# Patient Record
Sex: MALE | Race: BLACK OR AFRICAN AMERICAN | Employment: FULL TIME | URBAN - METROPOLITAN AREA
[De-identification: names, ages, dates, MRNs, and addresses within clinical notes are randomized per-mention and may not be internally consistent; named-entity substitution may affect disease eponyms.]

---

## 2017-03-26 PROBLEM — E11.8 TYPE 2 DIABETES MELLITUS WITH COMPLICATION, WITH LONG-TERM CURRENT USE OF INSULIN (HCC): Status: ACTIVE | Noted: 2017-03-26

## 2017-03-26 PROBLEM — Z79.4 INSULIN LONG-TERM USE (HCC): Status: ACTIVE | Noted: 2017-03-26

## 2017-03-26 PROBLEM — Z79.4 TYPE 2 DIABETES MELLITUS WITH COMPLICATION, WITH LONG-TERM CURRENT USE OF INSULIN (HCC): Status: ACTIVE | Noted: 2017-03-26

## 2017-03-26 PROBLEM — E11.59 HYPERTENSION COMPLICATING DIABETES (HCC): Status: ACTIVE | Noted: 2017-03-26

## 2017-03-26 PROBLEM — I15.2 HYPERTENSION COMPLICATING DIABETES (HCC): Status: ACTIVE | Noted: 2017-03-26

## 2017-07-21 ENCOUNTER — HOSPITAL ENCOUNTER (EMERGENCY)
Age: 52
Discharge: ANOTHER HOSPITAL | End: 2017-07-21
Attending: EMERGENCY MEDICINE
Payer: COMMERCIAL

## 2017-07-21 ENCOUNTER — HOSPITAL ENCOUNTER (INPATIENT)
Age: 52
LOS: 3 days | Discharge: HOME | DRG: 683 | End: 2017-07-25
Attending: HOSPITALIST | Admitting: HOSPITALIST
Payer: COMMERCIAL

## 2017-07-21 ENCOUNTER — APPOINTMENT (OUTPATIENT)
Dept: GENERAL RADIOLOGY | Age: 52
End: 2017-07-21
Attending: NURSE PRACTITIONER
Payer: COMMERCIAL

## 2017-07-21 VITALS
BODY MASS INDEX: 31.98 KG/M2 | OXYGEN SATURATION: 99 % | SYSTOLIC BLOOD PRESSURE: 126 MMHG | HEIGHT: 69 IN | WEIGHT: 215.9 LBS | HEART RATE: 69 BPM | RESPIRATION RATE: 14 BRPM | TEMPERATURE: 98.4 F | DIASTOLIC BLOOD PRESSURE: 72 MMHG

## 2017-07-21 DIAGNOSIS — R79.89 AZOTEMIA: Primary | ICD-10-CM

## 2017-07-21 DIAGNOSIS — N18.9 ACUTE ON CHRONIC KIDNEY FAILURE (HCC): ICD-10-CM

## 2017-07-21 DIAGNOSIS — N17.9 ACUTE ON CHRONIC KIDNEY FAILURE (HCC): ICD-10-CM

## 2017-07-21 LAB
ALBUMIN SERPL BCP-MCNC: 3.6 G/DL (ref 3.5–5)
ALBUMIN/GLOB SERPL: 0.8 {RATIO} (ref 1.1–2.2)
ALP SERPL-CCNC: 86 U/L (ref 45–117)
ALT SERPL-CCNC: 182 U/L (ref 12–78)
ANION GAP BLD CALC-SCNC: 13 MMOL/L (ref 5–15)
ANION GAP BLD CALC-SCNC: 14 MMOL/L (ref 5–15)
APPEARANCE UR: CLEAR
AST SERPL W P-5'-P-CCNC: 74 U/L (ref 15–37)
BACTERIA URNS QL MICRO: ABNORMAL /HPF
BASOPHILS # BLD AUTO: 0.1 K/UL (ref 0–0.1)
BASOPHILS # BLD: 1 % (ref 0–1)
BILIRUB SERPL-MCNC: 0.8 MG/DL (ref 0.2–1)
BILIRUB UR QL: NEGATIVE
BUN BLD-MCNC: 70 MG/DL (ref 9–20)
BUN SERPL-MCNC: 80 MG/DL (ref 6–20)
BUN/CREAT SERPL: 17 (ref 12–20)
CA-I BLD-MCNC: 1.15 MMOL/L (ref 1.12–1.32)
CALCIUM SERPL-MCNC: 8.8 MG/DL (ref 8.5–10.1)
CHLORIDE BLD-SCNC: 111 MMOL/L (ref 98–107)
CHLORIDE SERPL-SCNC: 102 MMOL/L (ref 97–108)
CO2 BLD-SCNC: 21 MMOL/L (ref 21–32)
CO2 SERPL-SCNC: 23 MMOL/L (ref 21–32)
COLOR UR: ABNORMAL
CREAT BLD-MCNC: 4 MG/DL (ref 0.6–1.3)
CREAT SERPL-MCNC: 4.63 MG/DL (ref 0.7–1.3)
DIFFERENTIAL METHOD BLD: ABNORMAL
EOSINOPHIL # BLD: 0.3 K/UL (ref 0–0.4)
EOSINOPHIL NFR BLD: 4 % (ref 0–7)
EPITH CASTS URNS QL MICRO: ABNORMAL /LPF
ERYTHROCYTE [DISTWIDTH] IN BLOOD BY AUTOMATED COUNT: 15.6 % (ref 11.5–14.5)
GLOBULIN SER CALC-MCNC: 4.4 G/DL (ref 2–4)
GLUCOSE BLD-MCNC: 77 MG/DL (ref 65–100)
GLUCOSE SERPL-MCNC: 90 MG/DL (ref 65–100)
GLUCOSE UR STRIP.AUTO-MCNC: NEGATIVE MG/DL
HCT VFR BLD AUTO: 35 % (ref 36.6–50.3)
HCT VFR BLD CALC: 31 % (ref 36.6–50.3)
HGB BLD-MCNC: 10.5 GM/DL (ref 12.1–17)
HGB BLD-MCNC: 11.9 G/DL (ref 12.1–17)
HGB UR QL STRIP: NEGATIVE
KETONES UR QL STRIP.AUTO: NEGATIVE MG/DL
LACTATE SERPL-SCNC: 0.8 MMOL/L (ref 0.4–2)
LEUKOCYTE ESTERASE UR QL STRIP.AUTO: NEGATIVE
LYMPHOCYTES # BLD AUTO: 28 % (ref 12–49)
LYMPHOCYTES # BLD: 2.2 K/UL (ref 0.8–3.5)
MCH RBC QN AUTO: 26.6 PG (ref 26–34)
MCHC RBC AUTO-ENTMCNC: 34 G/DL (ref 30–36.5)
MCV RBC AUTO: 78.3 FL (ref 80–99)
MONOCYTES # BLD: 0.8 K/UL (ref 0–1)
MONOCYTES NFR BLD AUTO: 11 % (ref 5–13)
NEUTS SEG # BLD: 4.3 K/UL (ref 1.8–8)
NEUTS SEG NFR BLD AUTO: 56 % (ref 32–75)
NITRITE UR QL STRIP.AUTO: NEGATIVE
PH UR STRIP: 5 [PH] (ref 5–8)
PLATELET # BLD AUTO: 208 K/UL (ref 150–400)
POTASSIUM BLD-SCNC: 3.4 MMOL/L (ref 3.5–5.1)
POTASSIUM SERPL-SCNC: 3.3 MMOL/L (ref 3.5–5.1)
PROT SERPL-MCNC: 8 G/DL (ref 6.4–8.2)
PROT UR STRIP-MCNC: 30 MG/DL
RBC # BLD AUTO: 4.47 M/UL (ref 4.1–5.7)
RBC #/AREA URNS HPF: ABNORMAL /HPF (ref 0–5)
RBC MORPH BLD: ABNORMAL
SERVICE CMNT-IMP: ABNORMAL
SODIUM BLD-SCNC: 141 MMOL/L (ref 136–145)
SODIUM SERPL-SCNC: 138 MMOL/L (ref 136–145)
SP GR UR REFRACTOMETRY: 1.01 (ref 1–1.03)
UA: UC IF INDICATED,UAUC: ABNORMAL
UROBILINOGEN UR QL STRIP.AUTO: 0.2 EU/DL (ref 0.2–1)
WBC # BLD AUTO: 7.7 K/UL (ref 4.1–11.1)
WBC URNS QL MICRO: ABNORMAL /HPF (ref 0–4)

## 2017-07-21 PROCEDURE — 85025 COMPLETE CBC W/AUTO DIFF WBC: CPT | Performed by: NURSE PRACTITIONER

## 2017-07-21 PROCEDURE — 87086 URINE CULTURE/COLONY COUNT: CPT | Performed by: NURSE PRACTITIONER

## 2017-07-21 PROCEDURE — 74022 RADEX COMPL AQT ABD SERIES: CPT

## 2017-07-21 PROCEDURE — 65270000032 HC RM SEMIPRIVATE

## 2017-07-21 PROCEDURE — 96361 HYDRATE IV INFUSION ADD-ON: CPT

## 2017-07-21 PROCEDURE — 87040 BLOOD CULTURE FOR BACTERIA: CPT | Performed by: NURSE PRACTITIONER

## 2017-07-21 PROCEDURE — 80053 COMPREHEN METABOLIC PANEL: CPT | Performed by: NURSE PRACTITIONER

## 2017-07-21 PROCEDURE — 74011250636 HC RX REV CODE- 250/636: Performed by: NURSE PRACTITIONER

## 2017-07-21 PROCEDURE — 96360 HYDRATION IV INFUSION INIT: CPT

## 2017-07-21 PROCEDURE — 83605 ASSAY OF LACTIC ACID: CPT | Performed by: NURSE PRACTITIONER

## 2017-07-21 PROCEDURE — 81001 URINALYSIS AUTO W/SCOPE: CPT | Performed by: NURSE PRACTITIONER

## 2017-07-21 PROCEDURE — 99285 EMERGENCY DEPT VISIT HI MDM: CPT

## 2017-07-21 PROCEDURE — 36415 COLL VENOUS BLD VENIPUNCTURE: CPT | Performed by: NURSE PRACTITIONER

## 2017-07-21 PROCEDURE — 80047 BASIC METABLC PNL IONIZED CA: CPT

## 2017-07-21 RX ORDER — SODIUM CHLORIDE 0.9 % (FLUSH) 0.9 %
5-10 SYRINGE (ML) INJECTION AS NEEDED
Status: DISCONTINUED | OUTPATIENT
Start: 2017-07-21 | End: 2017-07-21 | Stop reason: HOSPADM

## 2017-07-21 RX ORDER — SODIUM CHLORIDE 0.9 % (FLUSH) 0.9 %
5-10 SYRINGE (ML) INJECTION EVERY 8 HOURS
Status: DISCONTINUED | OUTPATIENT
Start: 2017-07-21 | End: 2017-07-21 | Stop reason: HOSPADM

## 2017-07-21 RX ORDER — SODIUM CHLORIDE 9 MG/ML
150 INJECTION, SOLUTION INTRAVENOUS ONCE
Status: COMPLETED | OUTPATIENT
Start: 2017-07-21 | End: 2017-07-21

## 2017-07-21 RX ADMIN — SODIUM CHLORIDE 150 ML/HR: 900 INJECTION, SOLUTION INTRAVENOUS at 21:07

## 2017-07-21 RX ADMIN — SODIUM CHLORIDE 1000 ML: 900 INJECTION, SOLUTION INTRAVENOUS at 19:56

## 2017-07-21 RX ADMIN — SODIUM CHLORIDE 1000 ML: 900 INJECTION, SOLUTION INTRAVENOUS at 20:59

## 2017-07-21 RX ADMIN — SODIUM CHLORIDE 150 ML/HR: 900 INJECTION, SOLUTION INTRAVENOUS at 21:06

## 2017-07-21 RX ADMIN — SODIUM CHLORIDE 1000 ML: 900 INJECTION, SOLUTION INTRAVENOUS at 18:59

## 2017-07-21 NOTE — IP AVS SNAPSHOT
[address] 
[phone] Patient: Lesta Aase MRN: [id number] :1965 Current Discharge Medication List  
  
CONTINUE these medications which have CHANGED Dose & Instructions Dispensing Information Comments Morning Noon Evening Bedtime  
 lisinopril 40 mg tablet Commonly known as:  Dorathy Massed What changed:  See the new instructions. Your last dose was: Your next dose is:    
   
   
 Dose:  20 mg Take 0.5 Tabs by mouth daily. Quantity:  90 Tab Refills:  3 CONTINUE these medications which have NOT CHANGED Dose & Instructions Dispensing Information Comments Morning Noon Evening Bedtime  
 amLODIPine 5 mg tablet Commonly known as:  Baker Bars Your last dose was: Your next dose is: TAKE 1 TABLET EVERY DAY Quantity:  30 Tab Refills:  2  
     
   
   
   
  
 aspirin delayed-release 81 mg tablet Your last dose was: Your next dose is: TAKE 1 TABLET BY MOUTH DAILY WITH BREAKFAST Quantity:  30 Tab Refills:  5 BD INSULIN PEN NEEDLE UF MINI 31 gauge x 3/16\" Ndle Generic drug:  Insulin Needles (Disposable) Your last dose was: Your next dose is: USE AS DIRECTED EVERY DAY Quantity:  100 Pen Needle Refills:  1  
     
   
   
   
  
 carvedilol 25 mg tablet Commonly known as:  Barb Rodgers Your last dose was: Your next dose is: TAKE 1 TABLET TWICE A DAY Quantity:  180 Tab Refills:  3  
     
   
   
   
  
 doxazosin 4 mg tablet Commonly known as:  CARDURA Your last dose was: Your next dose is: TAKE 1 TABLET BY MOUTH ONCE DAILY Quantity:  90 Tab Refills:  3 LEVEMIR FLEXTOUCH 100 unit/mL (3 mL) Inpn Generic drug:  insulin detemir Your last dose was: Your next dose is: INJECT 20 UNITS SUBCUTANEOUSLY EVERY DAY Quantity:  15 Adjustable Dose Pre-filled Pen Syringe Refills:  1  
     
   
   
   
  
 potassium chloride SR 10 mEq tablet Commonly known as:  KLOR-CON 10 Your last dose was: Your next dose is: TAKE 1 TABLET DAILY Quantity:  90 Tab Refills:  3 STOP taking these medications   
 furosemide 40 mg tablet Commonly known as:  LASIX  
   
  
 ondansetron 4 mg disintegrating tablet Commonly known as:  ZOFRAN ODT  
   
  
 spironolactone 25 mg tablet Commonly known as:  ALDACTONE Where to Get Your Medications These medications were sent to [address] [place] [address] S. [address] [place] [address] S. [address] [facility], [address] [place] [address] Hours:  24-hours Phone:  [phone]  
  lisinopril 40 mg tablet

## 2017-07-21 NOTE — IP AVS SNAPSHOT
[address] 
[phone] Patient: Carrington Connor MRN: [id number] :1965 You are allergic to the following Allergen Reactions Flexeril (Cyclobenzaprine) Nausea Only  
    
 Gabapentin Unknown (comments) Recent Documentation Weight BMI Smoking Status 101.5 kg 33.03 kg/m2 Current Every Day Smoker Emergency Contacts Name Discharge Info Relation Home Work Mobile Jonh Gibbons CAREGIVER [3] Spouse [3] [phone] About your hospitalization You were admitted on:  2017 You last received care in the:  [facility] [phone] You were discharged on:  2017 Unit phone number:  [phone] Why you were hospitalized Your primary diagnosis was:  Acute Kidney Injury (Hcc) Your diagnoses also included:  Stage 3 Chronic Kidney Disease, Type 2 Diabetes Mellitus With Complication, With Long-Term Current Use Of Insulin (Hcc), Insulin Long-Term Use (Hcc), Hypertension Complicating Diabetes (Hcc), Cardiomyopathy (Hcc), Food Poisoning Providers Seen During Your Hospitalizations Provider Role Specialty Primary office phone Parisa Gay MD Attending Provider [facility] [phone] Jacey Harden MD Attending Provider Internal Medicine [phone] Nati Nuñez MD Attending Provider Family Practice [phone] Your Primary Care Physician (PCP) Primary Care Physician Office Phone Office Fax James Howard [phone] Follow-up Information Follow up With Details Comments Contact Info Nati Nuñez MD   [address] [address] 
[phone] Current Discharge Medication List  
  
CONTINUE these medications which have CHANGED Dose & Instructions Dispensing Information Comments Morning Noon Evening Bedtime lisinopril 40 mg tablet Commonly known as:  Cosme Patrick What changed:  See the new instructions. Your last dose was: Your next dose is:    
   
   
 Dose:  20 mg Take 0.5 Tabs by mouth daily. Quantity:  90 Tab Refills:  3 CONTINUE these medications which have NOT CHANGED Dose & Instructions Dispensing Information Comments Morning Noon Evening Bedtime  
 amLODIPine 5 mg tablet Commonly known as:  Ncik Lute Your last dose was: Your next dose is: TAKE 1 TABLET EVERY DAY Quantity:  30 Tab Refills:  2  
     
   
   
   
  
 aspirin delayed-release 81 mg tablet Your last dose was: Your next dose is: TAKE 1 TABLET BY MOUTH DAILY WITH BREAKFAST Quantity:  30 Tab Refills:  5 BD INSULIN PEN NEEDLE UF MINI 31 gauge x 3/16\" Ndle Generic drug:  Insulin Needles (Disposable) Your last dose was: Your next dose is: USE AS DIRECTED EVERY DAY Quantity:  100 Pen Needle Refills:  1  
     
   
   
   
  
 carvedilol 25 mg tablet Commonly known as:  Tamia Ready Your last dose was: Your next dose is: TAKE 1 TABLET TWICE A DAY Quantity:  180 Tab Refills:  3  
     
   
   
   
  
 doxazosin 4 mg tablet Commonly known as:  CARDURA Your last dose was: Your next dose is: TAKE 1 TABLET BY MOUTH ONCE DAILY Quantity:  90 Tab Refills:  3 LEVEMIR FLEXTOUCH 100 unit/mL (3 mL) Inpn Generic drug:  insulin detemir Your last dose was: Your next dose is: INJECT 20 UNITS SUBCUTANEOUSLY EVERY DAY Quantity:  15 Adjustable Dose Pre-filled Pen Syringe Refills:  1  
     
   
   
   
  
 potassium chloride SR 10 mEq tablet Commonly known as:  KLOR-CON 10 Your last dose was: Your next dose is: TAKE 1 TABLET DAILY Quantity:  90 Tab Refills:  3 STOP taking these medications   
 furosemide 40 mg tablet Commonly known as:  LASIX  
   
  
 ondansetron 4 mg disintegrating tablet Commonly known as:  ZOFRAN ODT  
   
  
 spironolactone 25 mg tablet Commonly known as:  ALDACTONE Where to Get Your Medications These medications were sent to [address] [place] [address]  [facility] S. [address] [facility], [address] [place] [address] Hours:  24-hours Phone:  [phone]  
  lisinopril 40 mg tablet Discharge Instructions Patient Discharge Instructions Luli Brooks / [id number] : 1965 Admitted 2017 Discharged: 2017 Take Home Medications · It is important that you take the medication exactly as they are prescribed. · Keep your medication in the bottles provided by the pharmacist and keep a list of the medication names, dosages, and times to be taken in your wallet. · Do not take other medications without consulting your doctor. What to do at [facility] Recommended diet: Diabetic Diet, Recommended activity: Activity as tolerated, If you experience any of the following symptoms diarrhea, malaise, please follow up with Dr. Samuel Ríos. Follow-up Appointments Procedures  FOLLOW UP VISIT Appointment in: Two Weeks Dr. Samuel Ríos Standing Status:   Standing Number of Occurrences:   1 Order Specific Question:   Appointment in Answer: Two Weeks Information obtained by : 
I understand that if any problems occur once I am at home I am to contact my physician. I understand and acknowledge receipt of the instructions indicated above. Physician's or R.N.'s Signature                                                                  Date/Time Patient or Representative Signature                                                          Date/Time MyChart Activation Thank you for requesting access to [employer]. Please follow the instructions below to securely access and download your online medical record. [employer] allows you to send messages to your doctor, view your test results, renew your prescriptions, schedule appointments, and more. How Do I Sign Up? 1. In your internet browser, go to [email or web address] 
2. Click on the First Time User? Click Here link in the Sign In box. You will be redirect to the New Member Sign Up page. 3. Enter your [employer] Access Code exactly as it appears below. You will not need to use this code after youve completed the sign-up process. If you do not sign up before the expiration date, you must request a new code. [employer] Access Code: [email or web address] Expires: 10/19/2017 11:33 PM (This is the date your [employer] access code will ) 4. Enter the last four digits of your Social Security Number (xxxx) and Date of Birth (mm/dd/yyyy) as indicated and click Submit. You will be taken to the next sign-up page. 5. Create a [employer] ID. This will be your [employer] login ID and cannot be changed, so think of one that is secure and easy to remember. 6. Create a [employer] password. You can change your password at any time. 7. Enter your Password Reset Question and Answer. This can be used at a later time if you forget your password. 8. Enter your e-mail address. You will receive e-mail notification when new information is available in [address] 9. Click Sign Up. You can now view and download portions of your medical record. 10. Click the Download Summary menu link to download a portable copy of your medical information. Additional Information If you have questions, please visit the Frequently Asked Questions section of the [employer] website at [email or web address] Remember, [employer] is NOT to be used for urgent needs. For medical emergencies, dial 911. DISCHARGE SUMMARY from Nurse The following personal items are in your possession at time of discharge: 
 
Dental Appliances: None Visual Aid: Glasses Home Medications: None Jewelry: Bracelet, Ring, With patient, Watch Clothing: Undergarments, With patient, Socks, Slippers, Shorts Other Valuables: None PATIENT INSTRUCTIONS: 
 
After general anesthesia or intravenous sedation, for 24 hours or while taking prescription Narcotics: · Limit your activities · Do not drive and operate hazardous machinery · Do not make important personal or business decisions · Do  not drink alcoholic beverages · If you have not urinated within 8 hours after discharge, please contact your surgeon on call. Report the following to your surgeon: 
· Excessive pain, swelling, redness or odor of or around the surgical area · Temperature over 100.5 · Nausea and vomiting lasting longer than 4 hours or if unable to take medications · Any signs of decreased circulation or nerve impairment to extremity: change in color, persistent  numbness, tingling, coldness or increase pain · Any questions What to do at Home: *  Please give a list of your current medications to your Primary Care Provider. *  Please update this list whenever your medications are discontinued, doses are 
    changed, or new medications (including over-the-counter products) are added. *  Please carry medication information at all times in case of emergency situations. These are general instructions for a healthy lifestyle: No smoking/ No tobacco products/ Avoid exposure to second hand smoke Surgeon General's Warning:  Quitting smoking now greatly reduces serious risk to your health. Obesity, smoking, and sedentary lifestyle greatly increases your risk for illness A healthy diet, regular physical exercise & weight monitoring are important for maintaining a healthy lifestyle You may be retaining fluid if you have a history of heart failure or if you experience any of the following symptoms:  Weight gain of 3 pounds or more overnight or 5 pounds in a week, increased swelling in our hands or feet or shortness of breath while lying flat in bed. Please call your doctor as soon as you notice any of these symptoms; do not wait until your next office visit. Recognize signs and symptoms of STROKE: 
 
F-face looks uneven A-arms unable to move or move unevenly S-speech slurred or non-existent T-time-call 911 as soon as signs and symptoms begin-DO NOT go Back to bed or wait to see if you get better-TIME IS BRAIN. Warning Signs of HEART ATTACK Call 911 if you have these symptoms: 
? Chest discomfort. Most heart attacks involve discomfort in the center of the chest that lasts more than a few minutes, or that goes away and comes back. It can feel like uncomfortable pressure, squeezing, fullness, or pain. ? Discomfort in other areas of the upper body. Symptoms can include pain or discomfort in one or both arms, the back, neck, jaw, or stomach. ? Shortness of breath with or without chest discomfort. ? Other signs may include breaking out in a cold sweat, nausea, or lightheadedness. Don't wait more than five minutes to call [address]! Fast action can save your life. Calling 911 is almost always the fastest way to get lifesaving treatment. Emergency Medical Services staff can begin treatment when they arrive  up to an hour sooner than if someone gets to the hospital by car. The discharge information has been reviewed with the patient. The patient verbalized understanding. Discharge medications reviewed with the patient and appropriate educational materials and side effects teaching were provided. Discharge Orders None Introducing [facility] SERVICES! [employer] introduces [facility] patient portal. Now you can access parts of your medical record, email your doctor's office, and request medication refills online. 1. In your internet browser, go to [email or web address] 2. Click on the First Time User? Click Here link in the Sign In box. You will see the New Member Sign Up page. 3. Enter your [facility] Access Code exactly as it appears below. You will not need to use this code after youve completed the sign-up process. If you do not sign up before the expiration date, you must request a new code. · [facility] Access Code: [email or web address] Expires: 10/19/2017 11:33 PM 
 
4. Enter the last four digits of your Social Security Number (xxxx) and Date of Birth (mm/dd/yyyy) as indicated and click Submit. You will be taken to the next sign-up page. 5. Create a [facility] ID. This will be your [facility] [employer] login ID and cannot be changed, so think of one that is secure and easy to remember. 6. Create a [facility] password. You can change your password at any time. 7. Enter your Password Reset Question and Answer. This can be used at a later time if you forget your password. 8. Enter your e-mail address. You will receive e-mail notification when new information is available in [address] 9. Click Sign Up. You can now view and download portions of your medical record. 10. Click the Download Summary menu link to download a portable copy of your medical information. If you have questions, please visit the Frequently Asked Questions section of the [employer] [facility] website. Remember, [facility] is NOT to be used for urgent needs. For medical emergencies, dial 911. Now available from your iPhone and Android! General Information Please provide this summary of care documentation to your next provider. Patient Signature:  ____________________________________________________________ Date:  ____________________________________________________________  
  
Elizabethann Glad Provider Signature:  ____________________________________________________________ Date:  ____________________________________________________________

## 2017-07-21 NOTE — ED NOTES
TRANSFER - IN REPORT:    Verbal report received from ENRIQUE Batista(name) on [address] [facility]  for routine progression of care      Report consisted of patients Situation, Background, Assessment and   Recommendations(SBAR). Information from the following report(s) SBAR, Kardex, ED Summary and Intake/Output was reviewed with the receiving nurse. Opportunity for questions and clarification was provided. Assessment completed upon patients arrival to unit and care assumed.

## 2017-07-21 NOTE — ED NOTES
Emergency Department Nursing Plan of Care       The Nursing Plan of Care is developed from the Nursing assessment and Emergency Department Attending provider initial evaluation. The plan of care may be reviewed in the ED Provider note.     The Plan of Care was developed with the following considerations:   Patient / Family readiness to learn indicated by:verbalized understanding  Persons(s) to be included in education: patient  Barriers to Learning/Limitations:No    [address] Cheikh, RN    7/21/2017   7:03 PM

## 2017-07-21 NOTE — LETTER
[facility] EMERGENCY DEPT 
[address] [facility] [address] [phone] 
[phone] Work/School Note Date: 7/21/2017 To Whom It May concern: 
 
Chhaya Cavazos was seen and treated today in the emergency room by the following provider(s): 
Attending Provider: Maryjane Bañuelos MD 
Nurse Practitioner: Robert Montero NP. Chhaya Cavazos may return to work on 07/24/2016. Sincerely, Luan Plaza RN

## 2017-07-22 ENCOUNTER — APPOINTMENT (OUTPATIENT)
Dept: GENERAL RADIOLOGY | Age: 52
DRG: 683 | End: 2017-07-22
Attending: NURSE PRACTITIONER
Payer: COMMERCIAL

## 2017-07-22 ENCOUNTER — APPOINTMENT (OUTPATIENT)
Dept: CT IMAGING | Age: 52
DRG: 683 | End: 2017-07-22
Attending: NURSE PRACTITIONER
Payer: COMMERCIAL

## 2017-07-22 PROBLEM — N17.9 ACUTE KIDNEY INJURY (HCC): Status: ACTIVE | Noted: 2017-07-22

## 2017-07-22 PROBLEM — N18.30 STAGE 3 CHRONIC KIDNEY DISEASE (HCC): Status: ACTIVE | Noted: 2017-07-22

## 2017-07-22 PROBLEM — I42.9 CARDIOMYOPATHY (HCC): Status: ACTIVE | Noted: 2017-07-22

## 2017-07-22 LAB
ALBUMIN SERPL BCP-MCNC: 3.2 G/DL (ref 3.5–5)
ALBUMIN/GLOB SERPL: 0.7 {RATIO} (ref 1.1–2.2)
ALP SERPL-CCNC: 83 U/L (ref 45–117)
ALT SERPL-CCNC: 166 U/L (ref 12–78)
AMPHET UR QL SCN: NEGATIVE
ANION GAP BLD CALC-SCNC: 9 MMOL/L (ref 5–15)
APPEARANCE UR: CLEAR
AST SERPL W P-5'-P-CCNC: 76 U/L (ref 15–37)
BACTERIA URNS QL MICRO: NEGATIVE /HPF
BARBITURATES UR QL SCN: NEGATIVE
BASOPHILS # BLD AUTO: 0.1 K/UL (ref 0–0.1)
BASOPHILS # BLD: 1 % (ref 0–1)
BENZODIAZ UR QL: NEGATIVE
BILIRUB SERPL-MCNC: 0.8 MG/DL (ref 0.2–1)
BILIRUB UR QL: NEGATIVE
BNP SERPL-MCNC: 13 PG/ML (ref 0–100)
BUN SERPL-MCNC: 78 MG/DL (ref 6–20)
BUN/CREAT SERPL: 20 (ref 12–20)
CALCIUM SERPL-MCNC: 8.2 MG/DL (ref 8.5–10.1)
CANNABINOIDS UR QL SCN: NEGATIVE
CHLORIDE SERPL-SCNC: 107 MMOL/L (ref 97–108)
CO2 SERPL-SCNC: 21 MMOL/L (ref 21–32)
COCAINE UR QL SCN: NEGATIVE
COLOR UR: ABNORMAL
CREAT SERPL-MCNC: 3.99 MG/DL (ref 0.7–1.3)
CRP SERPL-MCNC: <0.29 MG/DL (ref 0–0.6)
DIFFERENTIAL METHOD BLD: ABNORMAL
DRUG SCRN COMMENT,DRGCM: NORMAL
EOSINOPHIL # BLD: 0.3 K/UL (ref 0–0.4)
EOSINOPHIL NFR BLD: 4 % (ref 0–7)
EPITH CASTS URNS QL MICRO: ABNORMAL /LPF
ERYTHROCYTE [DISTWIDTH] IN BLOOD BY AUTOMATED COUNT: 15.9 % (ref 11.5–14.5)
GLOBULIN SER CALC-MCNC: 4.3 G/DL (ref 2–4)
GLUCOSE BLD STRIP.AUTO-MCNC: 108 MG/DL (ref 65–100)
GLUCOSE BLD STRIP.AUTO-MCNC: 118 MG/DL (ref 65–100)
GLUCOSE BLD STRIP.AUTO-MCNC: 130 MG/DL (ref 65–100)
GLUCOSE BLD STRIP.AUTO-MCNC: 136 MG/DL (ref 65–100)
GLUCOSE BLD STRIP.AUTO-MCNC: 145 MG/DL (ref 65–100)
GLUCOSE SERPL-MCNC: 129 MG/DL (ref 65–100)
GLUCOSE UR STRIP.AUTO-MCNC: NEGATIVE MG/DL
HCT VFR BLD AUTO: 33.6 % (ref 36.6–50.3)
HGB BLD-MCNC: 11.2 G/DL (ref 12.1–17)
HGB UR QL STRIP: ABNORMAL
HYALINE CASTS URNS QL MICRO: ABNORMAL /LPF (ref 0–5)
INR PPP: 1.1 (ref 0.9–1.1)
KETONES UR QL STRIP.AUTO: NEGATIVE MG/DL
LACTATE SERPL-SCNC: 0.6 MMOL/L (ref 0.4–2)
LEUKOCYTE ESTERASE UR QL STRIP.AUTO: NEGATIVE
LYMPHOCYTES # BLD AUTO: 20 % (ref 12–49)
LYMPHOCYTES # BLD: 1.7 K/UL (ref 0.8–3.5)
MAGNESIUM SERPL-MCNC: 2.2 MG/DL (ref 1.6–2.4)
MCH RBC QN AUTO: 26.3 PG (ref 26–34)
MCHC RBC AUTO-ENTMCNC: 33.3 G/DL (ref 30–36.5)
MCV RBC AUTO: 78.9 FL (ref 80–99)
METHADONE UR QL: NEGATIVE
MONOCYTES # BLD: 1 K/UL (ref 0–1)
MONOCYTES NFR BLD AUTO: 12 % (ref 5–13)
NEUTS SEG # BLD: 5.4 K/UL (ref 1.8–8)
NEUTS SEG NFR BLD AUTO: 63 % (ref 32–75)
NITRITE UR QL STRIP.AUTO: NEGATIVE
OPIATES UR QL: NEGATIVE
PCP UR QL: NEGATIVE
PH UR STRIP: 5 [PH] (ref 5–8)
PHOSPHATE SERPL-MCNC: 3.4 MG/DL (ref 2.6–4.7)
PLATELET # BLD AUTO: 167 K/UL (ref 150–400)
PLATELET COMMENTS,PCOM: ABNORMAL
POTASSIUM SERPL-SCNC: 3.4 MMOL/L (ref 3.5–5.1)
PROT SERPL-MCNC: 7.5 G/DL (ref 6.4–8.2)
PROT UR STRIP-MCNC: 30 MG/DL
PROTHROMBIN TIME: 11.7 SEC (ref 9–11.1)
RBC # BLD AUTO: 4.26 M/UL (ref 4.1–5.7)
RBC #/AREA URNS HPF: ABNORMAL /HPF (ref 0–5)
RBC MORPH BLD: ABNORMAL
RBC MORPH BLD: ABNORMAL
SERVICE CMNT-IMP: ABNORMAL
SODIUM SERPL-SCNC: 137 MMOL/L (ref 136–145)
SP GR UR REFRACTOMETRY: 1.01 (ref 1–1.03)
TROPONIN I SERPL-MCNC: <0.04 NG/ML
UA: UC IF INDICATED,UAUC: ABNORMAL
UROBILINOGEN UR QL STRIP.AUTO: 0.2 EU/DL (ref 0.2–1)
WBC # BLD AUTO: 8.5 K/UL (ref 4.1–11.1)
WBC URNS QL MICRO: ABNORMAL /HPF (ref 0–4)

## 2017-07-22 PROCEDURE — 74011250637 HC RX REV CODE- 250/637: Performed by: NURSE PRACTITIONER

## 2017-07-22 PROCEDURE — 85025 COMPLETE CBC W/AUTO DIFF WBC: CPT | Performed by: NURSE PRACTITIONER

## 2017-07-22 PROCEDURE — 84100 ASSAY OF PHOSPHORUS: CPT | Performed by: NURSE PRACTITIONER

## 2017-07-22 PROCEDURE — 85610 PROTHROMBIN TIME: CPT | Performed by: NURSE PRACTITIONER

## 2017-07-22 PROCEDURE — 87045 FECES CULTURE AEROBIC BACT: CPT | Performed by: NURSE PRACTITIONER

## 2017-07-22 PROCEDURE — 74011250636 HC RX REV CODE- 250/636: Performed by: NURSE PRACTITIONER

## 2017-07-22 PROCEDURE — 83735 ASSAY OF MAGNESIUM: CPT | Performed by: NURSE PRACTITIONER

## 2017-07-22 PROCEDURE — 80053 COMPREHEN METABOLIC PANEL: CPT | Performed by: NURSE PRACTITIONER

## 2017-07-22 PROCEDURE — 93306 TTE W/DOPPLER COMPLETE: CPT

## 2017-07-22 PROCEDURE — 36415 COLL VENOUS BLD VENIPUNCTURE: CPT | Performed by: NURSE PRACTITIONER

## 2017-07-22 PROCEDURE — 71020 XR CHEST PA LAT: CPT

## 2017-07-22 PROCEDURE — 84484 ASSAY OF TROPONIN QUANT: CPT | Performed by: NURSE PRACTITIONER

## 2017-07-22 PROCEDURE — 89055 LEUKOCYTE ASSESSMENT FECAL: CPT | Performed by: NURSE PRACTITIONER

## 2017-07-22 PROCEDURE — 83605 ASSAY OF LACTIC ACID: CPT | Performed by: NURSE PRACTITIONER

## 2017-07-22 PROCEDURE — 70450 CT HEAD/BRAIN W/O DYE: CPT

## 2017-07-22 PROCEDURE — 83880 ASSAY OF NATRIURETIC PEPTIDE: CPT | Performed by: NURSE PRACTITIONER

## 2017-07-22 PROCEDURE — 87493 C DIFF AMPLIFIED PROBE: CPT | Performed by: NURSE PRACTITIONER

## 2017-07-22 PROCEDURE — 74011000250 HC RX REV CODE- 250: Performed by: NURSE PRACTITIONER

## 2017-07-22 PROCEDURE — 86140 C-REACTIVE PROTEIN: CPT | Performed by: NURSE PRACTITIONER

## 2017-07-22 PROCEDURE — 74011636637 HC RX REV CODE- 636/637: Performed by: NURSE PRACTITIONER

## 2017-07-22 PROCEDURE — 65660000000 HC RM CCU STEPDOWN

## 2017-07-22 PROCEDURE — 82272 OCCULT BLD FECES 1-3 TESTS: CPT | Performed by: NURSE PRACTITIONER

## 2017-07-22 PROCEDURE — 77030032490 HC SLV COMPR SCD KNE COVD -B

## 2017-07-22 PROCEDURE — 80307 DRUG TEST PRSMV CHEM ANLYZR: CPT | Performed by: NURSE PRACTITIONER

## 2017-07-22 PROCEDURE — C9113 INJ PANTOPRAZOLE SODIUM, VIA: HCPCS | Performed by: NURSE PRACTITIONER

## 2017-07-22 PROCEDURE — 82962 GLUCOSE BLOOD TEST: CPT

## 2017-07-22 PROCEDURE — 81001 URINALYSIS AUTO W/SCOPE: CPT | Performed by: NURSE PRACTITIONER

## 2017-07-22 RX ORDER — ONDANSETRON 2 MG/ML
4 INJECTION INTRAMUSCULAR; INTRAVENOUS
Status: DISCONTINUED | OUTPATIENT
Start: 2017-07-22 | End: 2017-07-25 | Stop reason: HOSPADM

## 2017-07-22 RX ORDER — FUROSEMIDE 40 MG/1
40 TABLET ORAL
Status: DISCONTINUED | OUTPATIENT
Start: 2017-07-22 | End: 2017-07-22

## 2017-07-22 RX ORDER — SODIUM CHLORIDE 0.9 % (FLUSH) 0.9 %
5-10 SYRINGE (ML) INJECTION EVERY 8 HOURS
Status: DISCONTINUED | OUTPATIENT
Start: 2017-07-22 | End: 2017-07-25 | Stop reason: HOSPADM

## 2017-07-22 RX ORDER — DOXAZOSIN 2 MG/1
4 TABLET ORAL DAILY
Status: DISCONTINUED | OUTPATIENT
Start: 2017-07-22 | End: 2017-07-25 | Stop reason: HOSPADM

## 2017-07-22 RX ORDER — SPIRONOLACTONE 25 MG/1
25 TABLET ORAL DAILY
Status: DISCONTINUED | OUTPATIENT
Start: 2017-07-22 | End: 2017-07-22

## 2017-07-22 RX ORDER — FAMOTIDINE 20 MG/1
20 TABLET, FILM COATED ORAL DAILY
Status: DISCONTINUED | OUTPATIENT
Start: 2017-07-23 | End: 2017-07-25 | Stop reason: HOSPADM

## 2017-07-22 RX ORDER — LISINOPRIL 20 MG/1
40 TABLET ORAL DAILY
Status: DISCONTINUED | OUTPATIENT
Start: 2017-07-22 | End: 2017-07-22

## 2017-07-22 RX ORDER — POTASSIUM CHLORIDE 750 MG/1
10 TABLET, FILM COATED, EXTENDED RELEASE ORAL DAILY
Status: DISCONTINUED | OUTPATIENT
Start: 2017-07-22 | End: 2017-07-25 | Stop reason: HOSPADM

## 2017-07-22 RX ORDER — MAGNESIUM SULFATE 100 %
4 CRYSTALS MISCELLANEOUS AS NEEDED
Status: DISCONTINUED | OUTPATIENT
Start: 2017-07-22 | End: 2017-07-25 | Stop reason: HOSPADM

## 2017-07-22 RX ORDER — DEXTROSE 50 % IN WATER (D50W) INTRAVENOUS SYRINGE
12.5-25 AS NEEDED
Status: DISCONTINUED | OUTPATIENT
Start: 2017-07-22 | End: 2017-07-22 | Stop reason: CLARIF

## 2017-07-22 RX ORDER — CARVEDILOL 12.5 MG/1
25 TABLET ORAL 2 TIMES DAILY WITH MEALS
Status: DISCONTINUED | OUTPATIENT
Start: 2017-07-22 | End: 2017-07-25 | Stop reason: HOSPADM

## 2017-07-22 RX ORDER — INSULIN LISPRO 100 [IU]/ML
INJECTION, SOLUTION INTRAVENOUS; SUBCUTANEOUS
Status: DISCONTINUED | OUTPATIENT
Start: 2017-07-22 | End: 2017-07-25 | Stop reason: HOSPADM

## 2017-07-22 RX ORDER — ASPIRIN 81 MG/1
81 TABLET ORAL DAILY
Status: DISCONTINUED | OUTPATIENT
Start: 2017-07-22 | End: 2017-07-25 | Stop reason: HOSPADM

## 2017-07-22 RX ORDER — SODIUM CHLORIDE 9 MG/ML
25 INJECTION, SOLUTION INTRAVENOUS CONTINUOUS
Status: DISCONTINUED | OUTPATIENT
Start: 2017-07-22 | End: 2017-07-25 | Stop reason: HOSPADM

## 2017-07-22 RX ORDER — SODIUM CHLORIDE 0.9 % (FLUSH) 0.9 %
5-10 SYRINGE (ML) INJECTION AS NEEDED
Status: DISCONTINUED | OUTPATIENT
Start: 2017-07-22 | End: 2017-07-25 | Stop reason: HOSPADM

## 2017-07-22 RX ORDER — INSULIN GLARGINE 100 [IU]/ML
20 INJECTION, SOLUTION SUBCUTANEOUS DAILY
Status: DISCONTINUED | OUTPATIENT
Start: 2017-07-22 | End: 2017-07-25 | Stop reason: HOSPADM

## 2017-07-22 RX ORDER — DEXTROSE MONOHYDRATE 100 MG/ML
125-250 INJECTION, SOLUTION INTRAVENOUS AS NEEDED
Status: DISCONTINUED | OUTPATIENT
Start: 2017-07-22 | End: 2017-07-25 | Stop reason: HOSPADM

## 2017-07-22 RX ORDER — AMLODIPINE BESYLATE 5 MG/1
5 TABLET ORAL DAILY
Status: DISCONTINUED | OUTPATIENT
Start: 2017-07-22 | End: 2017-07-25 | Stop reason: HOSPADM

## 2017-07-22 RX ADMIN — ASPIRIN 81 MG: 81 TABLET, COATED ORAL at 09:57

## 2017-07-22 RX ADMIN — CARVEDILOL 25 MG: 12.5 TABLET, FILM COATED ORAL at 07:03

## 2017-07-22 RX ADMIN — SODIUM CHLORIDE 100 ML/HR: 900 INJECTION, SOLUTION INTRAVENOUS at 21:42

## 2017-07-22 RX ADMIN — SODIUM CHLORIDE 100 ML/HR: 900 INJECTION, SOLUTION INTRAVENOUS at 01:37

## 2017-07-22 RX ADMIN — CARVEDILOL 25 MG: 12.5 TABLET, FILM COATED ORAL at 17:31

## 2017-07-22 RX ADMIN — Medication 10 ML: at 06:00

## 2017-07-22 RX ADMIN — INSULIN LISPRO 2 UNITS: 100 INJECTION, SOLUTION INTRAVENOUS; SUBCUTANEOUS at 07:04

## 2017-07-22 RX ADMIN — POTASSIUM CHLORIDE 10 MEQ: 750 TABLET, FILM COATED, EXTENDED RELEASE ORAL at 10:10

## 2017-07-22 RX ADMIN — SPIRONOLACTONE 25 MG: 25 TABLET, FILM COATED ORAL at 09:57

## 2017-07-22 RX ADMIN — Medication 10 ML: at 21:37

## 2017-07-22 RX ADMIN — AMLODIPINE BESYLATE 5 MG: 5 TABLET ORAL at 09:58

## 2017-07-22 RX ADMIN — DOXAZOSIN MESYLATE 4 MG: 2 TABLET ORAL at 10:10

## 2017-07-22 RX ADMIN — SODIUM CHLORIDE 100 ML/HR: 900 INJECTION, SOLUTION INTRAVENOUS at 12:32

## 2017-07-22 RX ADMIN — INSULIN GLARGINE 20 UNITS: 100 INJECTION, SOLUTION SUBCUTANEOUS at 10:11

## 2017-07-22 RX ADMIN — SODIUM CHLORIDE 40 MG: 9 INJECTION, SOLUTION INTRAMUSCULAR; INTRAVENOUS; SUBCUTANEOUS at 03:18

## 2017-07-22 RX ADMIN — FUROSEMIDE 40 MG: 40 TABLET ORAL at 07:03

## 2017-07-22 NOTE — ED NOTES
Pt noted to be resting comfortably. Pt updated on plan of care, has no questions or concerns at this time.  Given [place], fluids continue to infuse

## 2017-07-22 NOTE — ED PROVIDER NOTES
HPI Comments: Patient presents to ED reports he has been vomiting since Monday after eating fish. Reports he also has had diarrhea. Has not been able to eat. or drink. Reports abdominal cramping. 10/10. Cramping intermittent. Has not tried anything for the symptoms. states he has has vomiting and diarrhea several times. Patient is a 46 y.o. male presenting with vomiting. The history is provided by the patient. No  was used. Vomiting    This is a new problem. The current episode started more than 2 days ago. The problem occurs 2 to 4 times per day. The problem has not changed since onset. The emesis has an appearance of stomach contents. There has been no fever. Associated symptoms include diarrhea. Pertinent negatives include no chills, no fever, no headaches, no arthralgias, no myalgias, no cough and no headaches. Abdominal pain: cramps. Risk factors include suspect food intake. His past medical history is significant for DM. Past Medical History:   Diagnosis Date    Diabetes ([facility] [address]     Hypertension     Neurological disorder     t-12, l1    Type I (juvenile type) diabetes mellitus without mention of complication, not stated as uncontrolled        History reviewed. No pertinent surgical history. Family History:   Problem Relation Age of Onset    Hypertension Mother     Hypertension Sister        Social History     Social History    Marital status:      Spouse name: N/A    Number of children: N/A    Years of education: N/A     Occupational History    Not on file.      Social History Main Topics    Smoking status: Current Every Day Smoker     Packs/day: 0.50    Smokeless tobacco: Never Used    Alcohol use Yes      Comment: occ    Drug use: No    Sexual activity: Not on file     Other Topics Concern    Not on file     Social History Narrative         ALLERGIES: Flexeril [cyclobenzaprine] and Gabapentin    Review of Systems   Constitutional: Negative for chills, fatigue and fever. HENT: Negative for congestion and sore throat. Eyes: Negative for redness. Respiratory: Negative for cough, chest tightness and wheezing. Cardiovascular: Negative for chest pain. Gastrointestinal: Positive for diarrhea and vomiting. Abdominal pain: cramps. Genitourinary: Negative for dysuria. Musculoskeletal: Negative for arthralgias, back pain, myalgias, neck pain and neck stiffness. Skin: Negative for rash. Neurological: Negative for dizziness, syncope, weakness and headaches. Hematological: Negative for adenopathy. Psychiatric/Behavioral: Negative for agitation and behavioral problems. All other systems reviewed and are negative. Vitals:    07/21/17 1835 07/21/17 1850 07/21/17 1855 07/21/17 1914   BP:  (!) 89/54  111/58   Pulse:    83   Resp:    14   Temp:    97.9 °F (36.6 °C)   SpO2: 99%  99% 98%   Weight:       Height:                Physical Exam   Constitutional: He is oriented to person, place, and time. He appears well-developed and well-nourished. He appears distressed. HENT:   Head: Normocephalic and atraumatic. Right Ear: External ear normal.   Mouth/Throat: Oropharynx is clear and moist.   Eyes: Conjunctivae are normal. Right eye exhibits no discharge. Left eye exhibits no discharge. Neck: Normal range of motion. Neck supple. Cardiovascular: Normal rate, regular rhythm and normal heart sounds. Pulmonary/Chest: Effort normal and breath sounds normal. No respiratory distress. He has no wheezes. Abdominal: Soft. There is no tenderness. Hyperactive bowel sounds   Musculoskeletal: Normal range of motion. He exhibits no edema. Lymphadenopathy:     He has no cervical adenopathy. Neurological: He is alert and oriented to person, place, and time. No cranial nerve deficit. Skin: Skin is warm and dry. Psychiatric: He has a normal mood and affect.  His behavior is normal. Judgment and thought content normal.   Nursing note and vitals reviewed. MDM  Number of Diagnoses or Management Options  Acute on chronic kidney failure [facility]):   Azotemia:   Diagnosis management comments: DDX azotemia ARF acute on chronic RF dehydration       Amount and/or Complexity of Data Reviewed  Clinical lab tests: ordered and reviewed  Tests in the medicine section of CPT®: ordered and reviewed  Discuss the patient with other providers: yes    Patient Progress  Patient progress: stable    ED Course     Recent Results (from the past 12 hour(s))   METABOLIC PANEL, COMPREHENSIVE    Collection Time: 07/21/17  6:45 PM   Result Value Ref Range    Sodium 138 136 - 145 mmol/L    Potassium 3.3 (L) 3.5 - 5.1 mmol/L    Chloride 102 97 - 108 mmol/L    CO2 23 21 - 32 mmol/L    Anion gap 13 5 - 15 mmol/L    Glucose 90 65 - 100 mg/dL    BUN 80 (H) 6 - 20 MG/DL    Creatinine 4.63 (H) 0.70 - 1.30 MG/DL    BUN/Creatinine ratio 17 12 - 20      GFR est AA 16 (L) >60 ml/min/1.73m2    GFR est non-AA 13 (L) >60 ml/min/1.73m2    Calcium 8.8 8.5 - 10.1 MG/DL    Bilirubin, total 0.8 0.2 - 1.0 MG/DL    ALT (SGPT) 182 (H) 12 - 78 U/L    AST (SGOT) 74 (H) 15 - 37 U/L    Alk. phosphatase 86 45 - 117 U/L    Protein, total 8.0 6.4 - 8.2 g/dL    Albumin 3.6 3.5 - 5.0 g/dL    Globulin 4.4 (H) 2.0 - 4.0 g/dL    A-G Ratio 0.8 (L) 1.1 - 2.2     CBC WITH AUTOMATED DIFF    Collection Time: 07/21/17  6:45 PM   Result Value Ref Range    WBC 7.7 4.1 - 11.1 K/uL    RBC 4.47 4.10 - 5.70 M/uL    HGB 11.9 (L) 12.1 - 17.0 g/dL    HCT 35.0 (L) 36.6 - 50.3 %    MCV 78.3 (L) 80.0 - 99.0 FL    MCH 26.6 26.0 - 34.0 PG    MCHC 34.0 30.0 - 36.5 g/dL    RDW 15.6 (H) 11.5 - 14.5 %    PLATELET 012 843 - 737 K/uL    NEUTROPHILS 56 32 - 75 %    LYMPHOCYTES 28 12 - 49 %    MONOCYTES 11 5 - 13 %    EOSINOPHILS 4 0 - 7 %    BASOPHILS 1 0 - 1 %    ABS. NEUTROPHILS 4.3 1.8 - 8.0 K/UL    ABS. LYMPHOCYTES 2.2 0.8 - 3.5 K/UL    ABS. MONOCYTES 0.8 0.0 - 1.0 K/UL    ABS. EOSINOPHILS 0.3 0.0 - 0.4 K/UL    ABS.  BASOPHILS 0.1 0.0 - 0.1 K/UL    DF SMEAR SCANNED      RBC COMMENTS NORMOCYTIC, NORMOCHROMIC     LACTIC ACID    Collection Time: 07/21/17  6:45 PM   Result Value Ref Range    Lactic acid 0.8 0.4 - 2.0 MMOL/L   URINALYSIS W/ REFLEX CULTURE    Collection Time: 07/21/17  8:43 PM   Result Value Ref Range    Color YELLOW/STRAW      Appearance CLEAR CLEAR      Specific gravity 1.010 1.003 - 1.030      pH (UA) 5.0 5.0 - 8.0      Protein 30 (A) NEG mg/dL    Glucose NEGATIVE  NEG mg/dL    Ketone NEGATIVE  NEG mg/dL    Bilirubin NEGATIVE  NEG      Blood NEGATIVE  NEG      Urobilinogen 0.2 0.2 - 1.0 EU/dL    Nitrites NEGATIVE  NEG      Leukocyte Esterase NEGATIVE  NEG      WBC 0-4 0 - 4 /hpf    RBC 0-5 0 - 5 /hpf    Epithelial cells FEW FEW /lpf    Bacteria 1+ (A) NEG /hpf    UA:UC IF INDICATED URINE CULTURE ORDERED (A) CNI     POC CHEM8    Collection Time: 07/21/17  9:27 PM   Result Value Ref Range    Calcium, ionized (POC) 1.15 1.12 - 1.32 MMOL/L    Sodium (POC) 141 136 - 145 MMOL/L    Potassium (POC) 3.4 (L) 3.5 - 5.1 MMOL/L    Chloride (POC) 111 (H) 98 - 107 MMOL/L    CO2 (POC) 21 21 - 32 MMOL/L    Anion gap (POC) 14 5 - 15 mmol/L    Glucose (POC) 77 65 - 100 MG/DL    BUN (POC) 70 (H) 9 - 20 MG/DL    Creatinine (POC) 4.0 (H) 0.6 - 1.3 MG/DL    GFRAA, POC 19 (L) >60 ml/min/1.73m2    GFRNA, POC 16 (L) >60 ml/min/1.73m2    Hemoglobin (POC) 10.5 (L) 12.1 - 17.0 GM/DL    Hematocrit (POC) 31 (L) 36.6 - 50.3 %    Comment Comment Not Indicated. IMAGING RESULTS:  XR ABD ACUTE W 1 V CHEST   Final Result        Xr Abd Acute W 1 V Chest    Result Date: 7/21/2017  INDICATION:  abdominal pain EXAM: Frontal view of the chest and 2 views of the abdomen. No comparisons. FINDINGS: The cardiomediastinal silhouette is normal. Pulmonary vasculature is not engorged. No pneumothorax, focal parenchymal opacity or effusion. There is no free air. There are multiple air-fluid levels but no dilated loops of bowel. Metallic foreign body overlies the right pelvis.  No abnormal calcifications. IMPRESSION: 1. No acute cardiopulmonary disease 2. Nonspecific air-fluid levels but no dilated loops of bowel       MEDICATIONS GIVEN:  Medications   sodium chloride 0.9 % bolus infusion 1,000 mL (0 mL IntraVENous IV Completed 7/21/17 1922)   sodium chloride (NS) flush 5-10 mL (not administered)   sodium chloride (NS) flush 5-10 mL (not administered)   sodium chloride 0.9 % bolus infusion 1,000 mL (0 mL IntraVENous IV Completed 7/21/17 2040)   0.9% sodium chloride infusion (150 mL/hr IntraVENous New Bag 7/21/17 2107)       IMPRESSION:  1. Azotemia    2. Acute on chronic kidney failure ([facility] [address]        PLAN:  1. Transfer to [facility]        Patient is being transferred to [facility] medical floor, transfer accepted by Dr Carolee Hobbs. The reasons for their transfer have been discussed with them and available family.  They convey agreement and understanding for the need to be transferred as explained to them by this provider Da Peterson NP            Procedures

## 2017-07-22 NOTE — ROUTINE PROCESS
Bedside and Verbal shift change report given to Rocio Miles RN (oncoming nurse) by Vianney Giles RN (offgoing nurse). Report included the following information SBAR, Kardex, Intake/Output, MAR and Recent Results.

## 2017-07-22 NOTE — ED NOTES
Pt noted to be resting comfortably. Pt updated on plan of care, has no questions or concerns at this time. Offered Urinal, able to void. Will update as soon as transfer is confirmed.

## 2017-07-22 NOTE — ROUTINE PROCESS
Primary Nurse Jakub Coello RN and Perla Alvarado, RN performed a dual skin assessment on this patient No impairment noted  Phi score is 21

## 2017-07-22 NOTE — CONSULTS
[facility] CARDIOLOG[facility] CONSULT              Date of  Admission: 7/21/2017 11:38 PM            Assessment and Plan: Dunia Portillo is admitted with acute renal failure and syncope. # Syncope - vasovagal type episode based on symptoms. Exacerbated by diarrhea and renal failure. - Normal EF on echo. Does not require any further work up. # History of CMY - EF has normalized. Agree with holding aldactone and this can be stopped indefinately given normal EF. # Acute on chronic renal failure - likely pre-renal IVF. Will sign off, please call back with questions. REASON FOR CONSULT: Syncope  Primary Cardiologist: n/a    HPI: 46 yom admitted with diarrhea, acute renal failure and syncope. He reports onset of loose stools about 5 days ago. Symptoms gradually worsened. He felt near syncopal on Thursday and on Friday while sitting on the toilet he felt lightheaded, nauseous, diaphoretic and passed out. He denies prior episodes like this. Here noted to be in renal failure, currently receiving IVF. According to notes, he had cardiomyopathy with EF ~25% in 2012 at [facility], although patient is unaware of this. He is on medications that would be consistent with CMY, now prescribed by PCP. Here echo was done and shows normal function. He denies any cp, sob prior to this recent illness. 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 Patient Active Problem List    Diagnosis Date Noted    Acute kidney injury ([address] 07/22/2017    Stage 3 chronic kidney disease 07/22/2017    Cardiomyopathy ([address] 07/22/2017    Hypertension complicating diabetes ([address] 03/26/2017    Type 2 diabetes mellitus with complication, with long-term current use of insulin ([address] 03/26/2017    Insulin long-term use ([address] 03/26/2017      Rickie Hickey MD  Past Medical History:   Diagnosis Date    Cardiomyopathy [facility]) 05/2012 2012 Echo: severe conc LVH. EF 25-30%. Cath : clean coronaries.  [facility] hospitalization for Hypertensive Emergency     Diabetes ([facility] [address]     Hypertension     Hypertensive emergency 05/2012    [facility]. 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 Allergies   Allergen Reactions    Flexeril [Cyclobenzaprine] Nausea Only    Gabapentin Unknown (comments)      Family History   Problem Relation Age of Onset    Hypertension Mother     Hypertension Sister       Social History     Social History    Marital status:      Spouse name: N/A    Number of children: N/A    Years of education: N/A     Occupational History    Not on file.      Social History Main Topics    Smoking status: Current Every Day Smoker     Packs/day: 0.50    Smokeless tobacco: Never Used    Alcohol use Yes      Comment: occ    Drug use: No    Sexual activity: Not on file     Other Topics Concern    Not on file     Social History Narrative     Current Facility-Administered Medications   Medication Dose Route Frequency    amLODIPine (NORVASC) tablet 5 mg  5 mg Oral DAILY    aspirin delayed-release tablet 81 mg  81 mg Oral DAILY    carvedilol (COREG) tablet 25 mg  25 mg Oral BID WITH MEALS    doxazosin (CARDURA) tablet 4 mg  4 mg Oral DAILY    potassium chloride SR (KLOR-CON 10) tablet 10 mEq  10 mEq Oral DAILY    sodium chloride (NS) flush 5-10 mL  5-10 mL IntraVENous Q8H    sodium chloride (NS) flush 5-10 mL  5-10 mL IntraVENous PRN    acetaminophen (TYLENOL) solution 650 mg  650 mg Oral Q4H PRN    ondansetron (ZOFRAN) injection 4 mg  4 mg IntraVENous Q4H PRN    0.9% sodium chloride infusion  100 mL/hr IntraVENous CONTINUOUS    glucose chewable tablet 16 g  4 Tab Oral PRN    glucagon (GLUCAGEN) injection 1 mg  1 mg IntraMUSCular PRN    insulin lispro (HUMALOG) injection   SubCUTAneous AC&HS    dextrose 10% infusion 125-250 mL  125-250 mL IntraVENous PRN    insulin glargine (LANTUS) injection 20 Units  20 Units SubCUTAneous DAILY    [START ON 7/23/2017] famotidine (PEPCID) tablet 20 mg  20 mg Oral DAILY           Review of Symptoms:  A comprehensive review of systems was negative in 11 points other than stated above in HPI. Physical Exam    Visit Vitals    /84 (BP 1 Location: Right arm, BP Patient Position: Sitting)    Pulse 62    Temp 98 °F (36.7 °C)    Resp 16    Wt 97.9 kg (215 lb 14.4 oz)    SpO2 100%    BMI 31.88 kg/m2     Skin warm and dry  HEENT: WNL  Oropharynx without exudate. Neck supple  Lungs clear  Heart - RRR, Normal S1/ S2   No Mummurs, click or Rubs  No S3 or S4  Abdomen soft and non tender,   Pulses 2+ throughout   Neuro:  Normal facial grimace,  Moves all extremities. AAAO   Psych: unanxious    Labs:   Recent Results (from the past 24 hour(s))   METABOLIC PANEL, COMPREHENSIVE    Collection Time: 07/21/17  6:45 PM   Result Value Ref Range    Sodium 138 136 - 145 mmol/L    Potassium 3.3 (L) 3.5 - 5.1 mmol/L    Chloride 102 97 - 108 mmol/L    CO2 23 21 - 32 mmol/L    Anion gap 13 5 - 15 mmol/L    Glucose 90 65 - 100 mg/dL    BUN 80 (H) 6 - 20 MG/DL    Creatinine 4.63 (H) 0.70 - 1.30 MG/DL    BUN/Creatinine ratio 17 12 - 20      GFR est AA 16 (L) >60 ml/min/1.73m2    GFR est non-AA 13 (L) >60 ml/min/1.73m2    Calcium 8.8 8.5 - 10.1 MG/DL    Bilirubin, total 0.8 0.2 - 1.0 MG/DL    ALT (SGPT) 182 (H) 12 - 78 U/L    AST (SGOT) 74 (H) 15 - 37 U/L    Alk.  phosphatase 86 45 - 117 U/L    Protein, total 8.0 6.4 - 8.2 g/dL    Albumin 3.6 3.5 - 5.0 g/dL    Globulin 4.4 (H) 2.0 - 4.0 g/dL    A-G Ratio 0.8 (L) 1.1 - 2.2     CBC WITH AUTOMATED DIFF    Collection Time: 07/21/17  6:45 PM   Result Value Ref Range    WBC 7.7 4.1 - 11.1 K/uL    RBC 4.47 4.10 - 5.70 M/uL    HGB 11.9 (L) 12.1 - 17.0 g/dL    HCT 35.0 (L) 36.6 - 50.3 %    MCV 78.3 (L) 80.0 - 99.0 FL    MCH 26.6 26.0 - 34.0 PG    MCHC 34.0 30.0 - 36.5 g/dL    RDW 15.6 (H) 11.5 - 14.5 %    PLATELET 275 421 - 163 K/uL    NEUTROPHILS 56 32 - 75 %    LYMPHOCYTES 28 12 - 49 %    MONOCYTES 11 5 - 13 %    EOSINOPHILS 4 0 - 7 %    BASOPHILS 1 0 - 1 %    ABS. NEUTROPHILS 4.3 1.8 - 8.0 K/UL    ABS. LYMPHOCYTES 2.2 0.8 - 3.5 K/UL    ABS. MONOCYTES 0.8 0.0 - 1.0 K/UL    ABS. EOSINOPHILS 0.3 0.0 - 0.4 K/UL    ABS.  BASOPHILS 0.1 0.0 - 0.1 K/UL    DF SMEAR SCANNED      RBC COMMENTS NORMOCYTIC, NORMOCHROMIC     LACTIC ACID    Collection Time: 07/21/17  6:45 PM   Result Value Ref Range    Lactic acid 0.8 0.4 - 2.0 MMOL/L   CULTURE, BLOOD    Collection Time: 07/21/17  7:02 PM   Result Value Ref Range    Special Requests: NO SPECIAL REQUESTS      Culture result: NO GROWTH AFTER 8 HOURS     CULTURE, BLOOD    Collection Time: 07/21/17  7:57 PM   Result Value Ref Range    Special Requests: NO SPECIAL REQUESTS      Culture result: NO GROWTH AFTER 8 HOURS     URINALYSIS W/ REFLEX CULTURE    Collection Time: 07/21/17  8:43 PM   Result Value Ref Range    Color YELLOW/STRAW      Appearance CLEAR CLEAR      Specific gravity 1.010 1.003 - 1.030      pH (UA) 5.0 5.0 - 8.0      Protein 30 (A) NEG mg/dL    Glucose NEGATIVE  NEG mg/dL    Ketone NEGATIVE  NEG mg/dL    Bilirubin NEGATIVE  NEG      Blood NEGATIVE  NEG      Urobilinogen 0.2 0.2 - 1.0 EU/dL    Nitrites NEGATIVE  NEG      Leukocyte Esterase NEGATIVE  NEG      WBC 0-4 0 - 4 /hpf    RBC 0-5 0 - 5 /hpf    Epithelial cells FEW FEW /lpf    Bacteria 1+ (A) NEG /hpf    UA:UC IF INDICATED URINE CULTURE ORDERED (A) CNI     POC CHEM8    Collection Time: 07/21/17  9:27 PM   Result Value Ref Range    Calcium, ionized (POC) 1.15 1.12 - 1.32 MMOL/L    Sodium (POC) 141 136 - 145 MMOL/L    Potassium (POC) 3.4 (L) 3.5 - 5.1 MMOL/L    Chloride (POC) 111 (H) 98 - 107 MMOL/L    CO2 (POC) 21 21 - 32 MMOL/L    Anion gap (POC) 14 5 - 15 mmol/L    Glucose (POC) 77 65 - 100 MG/DL    BUN (POC) 70 (H) 9 - 20 MG/DL    Creatinine (POC) 4.0 (H) 0.6 - 1.3 MG/DL    GFRAA, POC 19 (L) >60 ml/min/1.73m2    GFRNA, POC 16 (L) >60 ml/min/1.73m2    Hemoglobin (POC) 10.5 (L) 12.1 - 17.0 GM/DL    Hematocrit (POC) 31 (L) 36.6 - 50.3 %    Comment Comment Not Indicated. DRUG SCREEN, URINE    Collection Time: 07/22/17  2:08 AM   Result Value Ref Range    AMPHETAMINES NEGATIVE  NEG      BARBITURATES NEGATIVE  NEG      BENZODIAZEPINE NEGATIVE  NEG      COCAINE NEGATIVE  NEG      METHADONE NEGATIVE  NEG      OPIATES NEGATIVE  NEG      PCP(PHENCYCLIDINE) NEGATIVE  NEG      THC (TH-CANNABINOL) NEGATIVE  NEG      Drug screen comment (NOTE)    URINALYSIS W/ REFLEX CULTURE    Collection Time: 07/22/17  2:08 AM   Result Value Ref Range    Color YELLOW/STRAW      Appearance CLEAR CLEAR      Specific gravity 1.009 1.003 - 1.030      pH (UA) 5.0 5.0 - 8.0      Protein 30 (A) NEG mg/dL    Glucose NEGATIVE  NEG mg/dL    Ketone NEGATIVE  NEG mg/dL    Bilirubin NEGATIVE  NEG      Blood SMALL (A) NEG      Urobilinogen 0.2 0.2 - 1.0 EU/dL    Nitrites NEGATIVE  NEG      Leukocyte Esterase NEGATIVE  NEG      WBC 0-4 0 - 4 /hpf    RBC 0-5 0 - 5 /hpf    Epithelial cells FEW FEW /lpf    Bacteria NEGATIVE  NEG /hpf    UA:UC IF INDICATED CULTURE NOT INDICATED BY UA RESULT CNI      Hyaline cast 2-5 0 - 5 /lpf   METABOLIC PANEL, COMPREHENSIVE    Collection Time: 07/22/17  2:26 AM   Result Value Ref Range    Sodium 137 136 - 145 mmol/L    Potassium 3.4 (L) 3.5 - 5.1 mmol/L    Chloride 107 97 - 108 mmol/L    CO2 21 21 - 32 mmol/L    Anion gap 9 5 - 15 mmol/L    Glucose 129 (H) 65 - 100 mg/dL    BUN 78 (H) 6 - 20 MG/DL    Creatinine 3.99 (H) 0.70 - 1.30 MG/DL    BUN/Creatinine ratio 20 12 - 20      GFR est AA 19 (L) >60 ml/min/1.73m2    GFR est non-AA 16 (L) >60 ml/min/1.73m2    Calcium 8.2 (L) 8.5 - 10.1 MG/DL    Bilirubin, total 0.8 0.2 - 1.0 MG/DL    ALT (SGPT) 166 (H) 12 - 78 U/L    AST (SGOT) 76 (H) 15 - 37 U/L    Alk.  phosphatase 83 45 - 117 U/L    Protein, total 7.5 6.4 - 8.2 g/dL    Albumin 3.2 (L) 3.5 - 5.0 g/dL    Globulin 4.3 (H) 2.0 - 4.0 g/dL    A-G Ratio 0.7 (L) 1.1 - 2.2     PHOSPHORUS    Collection Time: 07/22/17  2:26 AM   Result Value Ref Range    Phosphorus 3.4 2.6 - 4.7 MG/DL   MAGNESIUM    Collection Time: 07/22/17  2:26 AM   Result Value Ref Range    Magnesium 2.2 1.6 - 2.4 mg/dL   CBC WITH AUTOMATED DIFF    Collection Time: 07/22/17  2:26 AM   Result Value Ref Range    WBC 8.5 4.1 - 11.1 K/uL    RBC 4.26 4.10 - 5.70 M/uL    HGB 11.2 (L) 12.1 - 17.0 g/dL    HCT 33.6 (L) 36.6 - 50.3 %    MCV 78.9 (L) 80.0 - 99.0 FL    MCH 26.3 26.0 - 34.0 PG    MCHC 33.3 30.0 - 36.5 g/dL    RDW 15.9 (H) 11.5 - 14.5 %    PLATELET 358 799 - 374 K/uL    NEUTROPHILS 63 32 - 75 %    LYMPHOCYTES 20 12 - 49 %    MONOCYTES 12 5 - 13 %    EOSINOPHILS 4 0 - 7 %    BASOPHILS 1 0 - 1 %    ABS. NEUTROPHILS 5.4 1.8 - 8.0 K/UL    ABS. LYMPHOCYTES 1.7 0.8 - 3.5 K/UL    ABS. MONOCYTES 1.0 0.0 - 1.0 K/UL    ABS. EOSINOPHILS 0.3 0.0 - 0.4 K/UL    ABS.  BASOPHILS 0.1 0.0 - 0.1 K/UL    DF SMEAR SCANNED      PLATELET COMMENTS LARGE PLATELETS      RBC COMMENTS ANISOCYTOSIS  1+        RBC COMMENTS MICROCYTOSIS  1+       PROTHROMBIN TIME + INR    Collection Time: 07/22/17  2:26 AM   Result Value Ref Range    INR 1.1 0.9 - 1.1      Prothrombin time 11.7 (H) 9.0 - 11.1 sec   LACTIC ACID    Collection Time: 07/22/17  2:26 AM   Result Value Ref Range    Lactic acid 0.6 0.4 - 2.0 MMOL/L   BNP    Collection Time: 07/22/17  2:26 AM   Result Value Ref Range    BNP 13 0 - 100 pg/mL   TROPONIN I    Collection Time: 07/22/17  2:26 AM   Result Value Ref Range    Troponin-I, Qt. <0.04 <0.05 ng/mL   C REACTIVE PROTEIN, QT    Collection Time: 07/22/17  2:26 AM   Result Value Ref Range    C-Reactive protein <0.29 0.00 - 0.60 mg/dL   GLUCOSE, POC    Collection Time: 07/22/17  6:52 AM   Result Value Ref Range    Glucose (POC) 145 (H) 65 - 100 mg/dL    Performed by [address], POC    Collection Time: 07/22/17 10:06 AM   Result Value Ref Range    Glucose (POC) 136 (H) 65 - 100 mg/dL    Performed by Feli Garcia    GLUCOSE, POC    Collection Time: 07/22/17 12:03 PM   Result Value Ref Range    Glucose (POC) 130 (H) 65 - 100 mg/dL    Performed by [address] [place] [address]    ECG:  Not done  Echocardiogram: Normal EF

## 2017-07-22 NOTE — CONSULTS
[address] [facility] 12 [address] [facility]   [address] [facility]       Name:  Shira Kuhn   MR#:  [id number]   :  1965   Account #:  [de-identified]    Date of Consultation:  2017   Date of Adm:  2017       RENAL CONSULTATION    REQUESTING PHYSICIAN: Miriam Mohamud MD    REASON FOR CONSULTATION: Evaluation and management of   acute on chronic renal failure. SOURCE OF INFORMATION: The patient was seen and examined. History was obtained from the patient and chart review. HISTORY OF PRESENT ILLNESS: The patient is a 78-year-old   [facility] American male with past medical history significant for   diabetes, hypertension, who presented with 4-day history of abdominal   pain, nausea, vomiting and intermittent diarrhea. He was in his usual   state of health until Tuesday evening. The symptoms started after   eating fried fish at his mother-in-law's home. He had associated abdominal cramping, drenching sweats,   generalized malaise and frequent diarrhea. No blood in the stool. He   also had an episode of passing out on the toilet and hitting his face   when he fell on the floor. He was very weak and could not get up the   steps. He was brought to the ER, where he was found to have   evidence of acute renal failure with a BUN of 18, creatinine of 4.63. Review of previous labs shows baseline creatinine 2.16-2.24 back in    and . He also had some outside records from , when he was   hospitalized at [facility]. His echo at that time shows an EF of 20% to   25%. It seems like he has cardiomyopathy. He denies having any   previous history of heart attack or coronary stents. I suspect he may   have nonischemic cardiomyopathy. The patient denies taking any NSAIDs. No recent antibiotic use. No   skin rash. No trouble with urination. No blood in the urine. No foamy or   cloudy urine. He was started on IV fluids.  His renal function is slowly   improving with creatinine down to 3.99 today. He does not see any nephrologist as outpatient. He is not aware of his   degree of kidney function as outpatient. PAST MEDICAL HISTORY: As reviewed in HPI. PAST SURGICAL HISTORY: Status post ruptured appendix repair   several years ago. ALLERGIES: HE IS ALLERGIC TO   1. FLEXERIL. 2. GABAPENTIN. HOME MEDICATIONS: Include    1. Norvasc 5 mg daily. 2. Zofran p.r.n.   3. Spironolactone 25 mg daily. 4. Lisinopril 40 mg daily. 5. Coreg 25 mg b.i.d.   6. Potassium chloride 10 mEq daily. 7. Cardura 4 mg daily. 8. Lasix 40 mg b.i.d.   9. Aspirin 81 mg daily. SOCIAL HISTORY: He works as a . Does not smoke or drink   alcohol. Denies any illicit drug use. FAMILY HISTORY: His brother had a history of ESRD, who was on   dialysis. Sister has diabetes and hypertension. Mother has a history of   hypertension. REVIEW OF SYSTEMS: As noted in HPI. Remainder of review of   system obtained and negative. PHYSICAL EXAMINATION   GENERAL: Middle-aged male, well-built, well-nourished, in no acute   distress. VITAL SIGNS: He is afebrile. Pulse 62, /84, respirations of 14,   saturating 100% on room air. Weight is 97.9 kg. HEENT: Head is atraumatic, normocephalic. Conjunctivae is pink. Mucous membrane is moist. Sclerae are anicteric. NECK: No JVD, carotid bruit or cervical lymphadenopathy. LUNGS: Clear to auscultation. HEART: Regular rate and rhythm, normal S1 and 2, no murmur, no   rub. ABDOMEN: Soft, nontender, active bowel sounds. No abdominal bruit. No hepatosplenomegaly. EXTREMITIES: No clubbing, cyanosis or edema. SKIN: Warm and dry. No rashes. CENTRAL NERVOUS SYSTEM: Alert, oriented x3. Grossly nonfocal.    LABORATORY DATA: CBC is normal except mild anemia with a   hemoglobin of 11.2. Urinalysis shows only trace protein, otherwise   essentially negative. No significant hematuria, pyuria. BUN 78,   creatinine down to 3.99.  Potassium is slightly low at 3.4. LFTs are   abnormal with ALT elevated at 166, AST of 76. Alkaline phosphatase   is normal. Lactic acid is normal. A1c back in 2015 was 7.4. Microalbumin was around 766 mg back in October of 2015. Urine drug   screen was negative. ASSESSMENT   1. Acute kidney injury on chronic kidney disease. This appears to be   prerenal azotemia from severe nausea/vomiting/diarrhea compounded   by underlying use of ACE inhibitor and spironolactone. Renal   ultrasound has been ordered to rule out obstruction. No other   nephrotoxins noted. He is hemodynamically stable. Renal function is   improving. 2. Chronic kidney disease III. Suspect this is due to underlying diabetic   nephropathy and hypertension. 3. Hypertension. 4. Chronic systolic congestive heart failure. His ejection fraction was   quite low 5 years ago back at [facility]. His medication regimen also   suggests that he may have underlying cardiomyopathy. A repeat echo   has been ordered. 5. Mild hypokalemia. 6. Nausea/ vomiting/diarrhea. Suspect food poisoning. His platelets are   normal, and hence, HUS unlikely. RECOMMENDATIONS   1. Continue IV normal saline for the next 24-48 hours. Taper the rate of   IV fluid in 1-2 days. 2. Replace potassium, cautiously p.r.n.   3. Hold ACE inhibitor. 4. I have stopped spironolactone for now, while we hydrate him and   while he has ELMER. 5. Continue current BP medications. 6. Renal ultrasound to exclude obstruction. 7. Monitor I's and O's.   8. Follow daily labs. 9. Renally adjust doses of medications, where indicated. 10. No indication for dialysis. 11. He would benefit from seeing us in the office for followup of CKD. Thanks for the renal consultation. We will follow the patient with you. Discussed with the patient, nurse and Dr. Alyson Chase.         Funmi Underwood MD      BP / PAG   D:  07/22/2017   10:54   T:  07/22/2017   11:43   Job #:  [id number]

## 2017-07-22 NOTE — PROGRESS NOTES
Pt seen. 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  EF of 20-25% back in 2012 at [facility]  Mild Hypokalemia  HTN  N/V/D    P:  Isotonic IVF  Hold ACE-I; stop Sprinolactone for now until ELMER resolves  Renal US- to excluded obstruction  Cautious prn kcl replacement  Monitor Ios  Daily labs  Will benefit from f/u as outpt for CKD  Echo    D/W pt and Dr. Lindsay Watkins MD  [facility]

## 2017-07-22 NOTE — ROUTINE PROCESS
Bedside shift change report given to Cristal Marques RN (oncoming nurse) by Daryle Mons, RN (offgoing nurse). Report included the following information SBAR.

## 2017-07-22 NOTE — CONSULTS
Spoke with Dr. Kenny He.  Will see him for renal consult  Chart revd  Formal consult to follow    Dayana Saeed MD  [address] [facility]

## 2017-07-22 NOTE — ED NOTES
Report given to RAA provider, patient left via stretcher, patient denies pain or nausea. VSS, IVF held for transport. Patients wife will follow in her car.

## 2017-07-22 NOTE — PROGRESS NOTES
Consulted Internal Medicine for Dr. Dave Orellana but Dr. Darwin Abdullahi is on call and he said he would be up soon to see the patient.

## 2017-07-22 NOTE — PROGRESS NOTES
Medical Progress Note      NAME: Michael Wilkinson   :  1965  MRM:  [id number]    Date/Time: 2017           Problem List:     Active Problems:    Hypertension complicating diabetes ([address] (3/26/2017)      Type 2 diabetes mellitus with complication, with long-term current use of insulin ([address] (3/26/2017)      Insulin long-term use ([address] (3/26/2017)      Acute kidney injury ([address] (2017)      Stage 3 chronic kidney disease (2017)      Cardiomyopathy ([address] (2017)      Overview:  Echo: severe conc LVH. EF 25-30%. Cath : clean coronaries. [facility]             Subjective:     Seen for Dr. Andreea Ragsdale , PCP. Pt feels stronger . Reports little urine output since admission. Denies hx kidney stones. Denies abd pain, nausea, vomiting,diarrhea. Denies sob, dizziness, cp, palpitations. Past Medical History:   Diagnosis Date    Cardiomyopathy [facility]) 2017 Echo: severe conc LVH. EF 25-30%. Cath : clean coronaries. [facility]    Diabetes ([address]     Hypertension     Hypertensive emergency 2012    [facility]. 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, l1    Stage 3 chronic kidney disease 2017    Type I (juvenile type) diabetes mellitus without mention of complication, not stated as uncontrolled             Objective:         Vitals:      Last 24hrs VS reviewed since prior progress note.  Most recent are:    Visit Vitals    /84 (BP 1 Location: Right arm, BP Patient Position: Sitting)    Pulse 62    Temp 98.4 °F (36.9 °C)    Resp 14    Wt 215 lb 14.4 oz (97.9 kg)    SpO2 100%    BMI 31.88 kg/m2     SpO2 Readings from Last 6 Encounters:   17 100%   17 99%   13 99%            Intake/Output Summary (Last 24 hours) at 17 1037  Last data filed at 17 0708   Gross per 24 hour   Intake              240 ml   Output              625 ml   Net             -385 ml                  Exam:      General:  Alert, cooperative, no distress, appears stated age. Lungs:   Clear to auscultation bilaterally. Heart:  Regular rate and rhythm, S1, S2 normal, no murmur, click, rub or gallop. Abdomen:   Soft, non-tender. Bowel sounds normal. No masses,  No organomegaly. Extremities: Feet : 2+ DP pulses. Dry feet skin and thick toenails  No pedal edema. LT sens intact in feet. Lab Data Reviewed: (see below)  Recent Results (from the past 24 hour(s))   METABOLIC PANEL, COMPREHENSIVE    Collection Time: 07/21/17  6:45 PM   Result Value Ref Range    Sodium 138 136 - 145 mmol/L    Potassium 3.3 (L) 3.5 - 5.1 mmol/L    Chloride 102 97 - 108 mmol/L    CO2 23 21 - 32 mmol/L    Anion gap 13 5 - 15 mmol/L    Glucose 90 65 - 100 mg/dL    BUN 80 (H) 6 - 20 MG/DL    Creatinine 4.63 (H) 0.70 - 1.30 MG/DL    BUN/Creatinine ratio 17 12 - 20      GFR est AA 16 (L) >60 ml/min/1.73m2    GFR est non-AA 13 (L) >60 ml/min/1.73m2    Calcium 8.8 8.5 - 10.1 MG/DL    Bilirubin, total 0.8 0.2 - 1.0 MG/DL    ALT (SGPT) 182 (H) 12 - 78 U/L    AST (SGOT) 74 (H) 15 - 37 U/L    Alk. phosphatase 86 45 - 117 U/L    Protein, total 8.0 6.4 - 8.2 g/dL    Albumin 3.6 3.5 - 5.0 g/dL    Globulin 4.4 (H) 2.0 - 4.0 g/dL    A-G Ratio 0.8 (L) 1.1 - 2.2     CBC WITH AUTOMATED DIFF    Collection Time: 07/21/17  6:45 PM   Result Value Ref Range    WBC 7.7 4.1 - 11.1 K/uL    RBC 4.47 4.10 - 5.70 M/uL    HGB 11.9 (L) 12.1 - 17.0 g/dL    HCT 35.0 (L) 36.6 - 50.3 %    MCV 78.3 (L) 80.0 - 99.0 FL    MCH 26.6 26.0 - 34.0 PG    MCHC 34.0 30.0 - 36.5 g/dL    RDW 15.6 (H) 11.5 - 14.5 %    PLATELET 429 488 - 639 K/uL    NEUTROPHILS 56 32 - 75 %    LYMPHOCYTES 28 12 - 49 %    MONOCYTES 11 5 - 13 %    EOSINOPHILS 4 0 - 7 %    BASOPHILS 1 0 - 1 %    ABS. NEUTROPHILS 4.3 1.8 - 8.0 K/UL    ABS. LYMPHOCYTES 2.2 0.8 - 3.5 K/UL    ABS. MONOCYTES 0.8 0.0 - 1.0 K/UL    ABS. EOSINOPHILS 0.3 0.0 - 0.4 K/UL    ABS.  BASOPHILS 0.1 0.0 - 0.1 K/UL    DF SMEAR SCANNED      RBC COMMENTS NORMOCYTIC, NORMOCHROMIC     LACTIC ACID Collection Time: 07/21/17  6:45 PM   Result Value Ref Range    Lactic acid 0.8 0.4 - 2.0 MMOL/L   CULTURE, BLOOD    Collection Time: 07/21/17  7:02 PM   Result Value Ref Range    Special Requests: NO SPECIAL REQUESTS      Culture result: NO GROWTH AFTER 8 HOURS     CULTURE, BLOOD    Collection Time: 07/21/17  7:57 PM   Result Value Ref Range    Special Requests: NO SPECIAL REQUESTS      Culture result: NO GROWTH AFTER 8 HOURS     URINALYSIS W/ REFLEX CULTURE    Collection Time: 07/21/17  8:43 PM   Result Value Ref Range    Color YELLOW/STRAW      Appearance CLEAR CLEAR      Specific gravity 1.010 1.003 - 1.030      pH (UA) 5.0 5.0 - 8.0      Protein 30 (A) NEG mg/dL    Glucose NEGATIVE  NEG mg/dL    Ketone NEGATIVE  NEG mg/dL    Bilirubin NEGATIVE  NEG      Blood NEGATIVE  NEG      Urobilinogen 0.2 0.2 - 1.0 EU/dL    Nitrites NEGATIVE  NEG      Leukocyte Esterase NEGATIVE  NEG      WBC 0-4 0 - 4 /hpf    RBC 0-5 0 - 5 /hpf    Epithelial cells FEW FEW /lpf    Bacteria 1+ (A) NEG /hpf    UA:UC IF INDICATED URINE CULTURE ORDERED (A) CNI     POC CHEM8    Collection Time: 07/21/17  9:27 PM   Result Value Ref Range    Calcium, ionized (POC) 1.15 1.12 - 1.32 MMOL/L    Sodium (POC) 141 136 - 145 MMOL/L    Potassium (POC) 3.4 (L) 3.5 - 5.1 MMOL/L    Chloride (POC) 111 (H) 98 - 107 MMOL/L    CO2 (POC) 21 21 - 32 MMOL/L    Anion gap (POC) 14 5 - 15 mmol/L    Glucose (POC) 77 65 - 100 MG/DL    BUN (POC) 70 (H) 9 - 20 MG/DL    Creatinine (POC) 4.0 (H) 0.6 - 1.3 MG/DL    GFRAA, POC 19 (L) >60 ml/min/1.73m2    GFRNA, POC 16 (L) >60 ml/min/1.73m2    Hemoglobin (POC) 10.5 (L) 12.1 - 17.0 GM/DL    Hematocrit (POC) 31 (L) 36.6 - 50.3 %    Comment Comment Not Indicated.      DRUG SCREEN, URINE    Collection Time: 07/22/17  2:08 AM   Result Value Ref Range    AMPHETAMINES NEGATIVE  NEG      BARBITURATES NEGATIVE  NEG      BENZODIAZEPINE NEGATIVE  NEG      COCAINE NEGATIVE  NEG      METHADONE NEGATIVE  NEG      OPIATES NEGATIVE  NEG PCP(PHENCYCLIDINE) NEGATIVE  NEG      THC (TH-CANNABINOL) NEGATIVE  NEG      Drug screen comment (NOTE)    URINALYSIS W/ REFLEX CULTURE    Collection Time: 07/22/17  2:08 AM   Result Value Ref Range    Color YELLOW/STRAW      Appearance CLEAR CLEAR      Specific gravity 1.009 1.003 - 1.030      pH (UA) 5.0 5.0 - 8.0      Protein 30 (A) NEG mg/dL    Glucose NEGATIVE  NEG mg/dL    Ketone NEGATIVE  NEG mg/dL    Bilirubin NEGATIVE  NEG      Blood SMALL (A) NEG      Urobilinogen 0.2 0.2 - 1.0 EU/dL    Nitrites NEGATIVE  NEG      Leukocyte Esterase NEGATIVE  NEG      WBC 0-4 0 - 4 /hpf    RBC 0-5 0 - 5 /hpf    Epithelial cells FEW FEW /lpf    Bacteria NEGATIVE  NEG /hpf    UA:UC IF INDICATED CULTURE NOT INDICATED BY UA RESULT CNI      Hyaline cast 2-5 0 - 5 /lpf   METABOLIC PANEL, COMPREHENSIVE    Collection Time: 07/22/17  2:26 AM   Result Value Ref Range    Sodium 137 136 - 145 mmol/L    Potassium 3.4 (L) 3.5 - 5.1 mmol/L    Chloride 107 97 - 108 mmol/L    CO2 21 21 - 32 mmol/L    Anion gap 9 5 - 15 mmol/L    Glucose 129 (H) 65 - 100 mg/dL    BUN 78 (H) 6 - 20 MG/DL    Creatinine 3.99 (H) 0.70 - 1.30 MG/DL    BUN/Creatinine ratio 20 12 - 20      GFR est AA 19 (L) >60 ml/min/1.73m2    GFR est non-AA 16 (L) >60 ml/min/1.73m2    Calcium 8.2 (L) 8.5 - 10.1 MG/DL    Bilirubin, total 0.8 0.2 - 1.0 MG/DL    ALT (SGPT) 166 (H) 12 - 78 U/L    AST (SGOT) 76 (H) 15 - 37 U/L    Alk.  phosphatase 83 45 - 117 U/L    Protein, total 7.5 6.4 - 8.2 g/dL    Albumin 3.2 (L) 3.5 - 5.0 g/dL    Globulin 4.3 (H) 2.0 - 4.0 g/dL    A-G Ratio 0.7 (L) 1.1 - 2.2     PHOSPHORUS    Collection Time: 07/22/17  2:26 AM   Result Value Ref Range    Phosphorus 3.4 2.6 - 4.7 MG/DL   MAGNESIUM    Collection Time: 07/22/17  2:26 AM   Result Value Ref Range    Magnesium 2.2 1.6 - 2.4 mg/dL   CBC WITH AUTOMATED DIFF    Collection Time: 07/22/17  2:26 AM   Result Value Ref Range    WBC 8.5 4.1 - 11.1 K/uL    RBC 4.26 4.10 - 5.70 M/uL    HGB 11.2 (L) 12.1 - 17.0 g/dL    HCT 33.6 (L) 36.6 - 50.3 %    MCV 78.9 (L) 80.0 - 99.0 FL    MCH 26.3 26.0 - 34.0 PG    MCHC 33.3 30.0 - 36.5 g/dL    RDW 15.9 (H) 11.5 - 14.5 %    PLATELET 860 637 - 970 K/uL    NEUTROPHILS 63 32 - 75 %    LYMPHOCYTES 20 12 - 49 %    MONOCYTES 12 5 - 13 %    EOSINOPHILS 4 0 - 7 %    BASOPHILS 1 0 - 1 %    ABS. NEUTROPHILS 5.4 1.8 - 8.0 K/UL    ABS. LYMPHOCYTES 1.7 0.8 - 3.5 K/UL    ABS. MONOCYTES 1.0 0.0 - 1.0 K/UL    ABS. EOSINOPHILS 0.3 0.0 - 0.4 K/UL    ABS.  BASOPHILS 0.1 0.0 - 0.1 K/UL    DF SMEAR SCANNED      PLATELET COMMENTS LARGE PLATELETS      RBC COMMENTS ANISOCYTOSIS  1+        RBC COMMENTS MICROCYTOSIS  1+       PROTHROMBIN TIME + INR    Collection Time: 07/22/17  2:26 AM   Result Value Ref Range    INR 1.1 0.9 - 1.1      Prothrombin time 11.7 (H) 9.0 - 11.1 sec   LACTIC ACID    Collection Time: 07/22/17  2:26 AM   Result Value Ref Range    Lactic acid 0.6 0.4 - 2.0 MMOL/L   BNP    Collection Time: 07/22/17  2:26 AM   Result Value Ref Range    BNP 13 0 - 100 pg/mL   TROPONIN I    Collection Time: 07/22/17  2:26 AM   Result Value Ref Range    Troponin-I, Qt. <0.04 <0.05 ng/mL   C REACTIVE PROTEIN, QT    Collection Time: 07/22/17  2:26 AM   Result Value Ref Range    C-Reactive protein <0.29 0.00 - 0.60 mg/dL   GLUCOSE, POC    Collection Time: 07/22/17  6:52 AM   Result Value Ref Range    Glucose (POC) 145 (H) 65 - 100 mg/dL    Performed by [address], POC    Collection Time: 07/22/17 10:06 AM   Result Value Ref Range    Glucose (POC) 136 (H) 65 - 100 mg/dL    Performed by Sivakumar Norris        Medications Reviewed: (see below)    ______________________________________________________________________    Medications:     Current Facility-Administered Medications   Medication Dose Route Frequency    amLODIPine (NORVASC) tablet 5 mg  5 mg Oral DAILY    aspirin delayed-release tablet 81 mg  81 mg Oral DAILY    carvedilol (COREG) tablet 25 mg  25 mg Oral BID WITH MEALS    doxazosin (CARDURA) tablet 4 mg  4 mg Oral DAILY    potassium chloride SR (KLOR-CON 10) tablet 10 mEq  10 mEq Oral DAILY    spironolactone (ALDACTONE) tablet 25 mg  25 mg Oral DAILY    sodium chloride (NS) flush 5-10 mL  5-10 mL IntraVENous Q8H    sodium chloride (NS) flush 5-10 mL  5-10 mL IntraVENous PRN    acetaminophen (TYLENOL) solution 650 mg  650 mg Oral Q4H PRN    ondansetron (ZOFRAN) injection 4 mg  4 mg IntraVENous Q4H PRN    0.9% sodium chloride infusion  100 mL/hr IntraVENous CONTINUOUS    glucose chewable tablet 16 g  4 Tab Oral PRN    glucagon (GLUCAGEN) injection 1 mg  1 mg IntraMUSCular PRN    insulin lispro (HUMALOG) injection   SubCUTAneous AC&HS    dextrose 10% infusion 125-250 mL  125-250 mL IntraVENous PRN    insulin glargine (LANTUS) injection 20 Units  20 Units SubCUTAneous DAILY    pantoprazole (PROTONIX) 40 mg in sodium chloride 0.9 % 10 mL injection  40 mg IntraVENous Q12H                   Assessment:   ELMER . Volume Depletion -improving . Renal C/s appreciated. Check renal sono. Syncope x2  Occurred 2 nights ago . P: Echo, Cardiology c/s. DM , controlled. HTN  Controlled. Suspect infectious  Gastroenteritis as cause of volume depletion , clinically improving. Slight Hypokalemia. P: replete. Elevated ALT , possibly due to Gastroenteritis. Patient Active Problem List   Diagnosis Code    Hypertension complicating diabetes ([facility] E11.59, I10    Type 2 diabetes mellitus with complication, with long-term current use of insulin (HCC) E11.8, Z79.4    Insulin long-term use (HCC) Z79.4    Acute kidney injury ([address] N17.9    Stage 3 chronic kidney disease N18.3    Cardiomyopathy ([facility] I42.9          Plan:     As above.   Rpt labs in am.                                                        ___________________________________________________      Attending Physician: Ross Levy MD

## 2017-07-22 NOTE — H&P
History & Physical      Date of admission: 7/21/2017    Patient name: Andrey Iniguez  MRN: [id number]  YOB: 1965  Age: 46 y.o. Primary care provider:  Maria Luz Del Castillo MD     Source of Information: patient, medical records and attending                                 Chief complain: Nausea, vomiting diarrhea, resulting in acute kidney injury  History of present illness  Andrey Iniguez is a 46 y.o. male with past medical history of DM, HTN,  who presents with 4 day history of abdominal pain, nausea vomiting and intermittent diarrhea. This patient reports he was in his usual state of health until Tuesday evening. He had fried whitting fish at his mother-in-laws home. He was well until late Tuesday night early Wed morning when he began having abdominal cramping associated with drenching sweats, generalize malaise and frequent diarrhea. This continued over next several days on Thursday he report\" passing out\" on the toilet and then again when he was ambulating from bathroom to his room. He stats he fell on his face. He was also very weak an could not get off the steps. He called 911 and paramedics arrived at his home checked his vital signs (reportedly ok), POC glucose 104. He was not transported to ER but was encouraged to keep himself hydrated and to use Zophran as prescribed by his PCP. As his symptoms did not improve this patient again contacted EMS on the day of presentation and was transported to [facility]. In the ER his diagnostic studies revealed  Na 138, creat 4.63, negative abdomen xray. He was transfer to [facility] for treatment and evaluation of acute on chronic renal disease.      Past Medical History:   Diagnosis Date    Diabetes ([facility]     Hypertension     Neurological disorder     t-12, l1    Type I (juvenile type) diabetes mellitus without mention of complication, not stated as uncontrolled       No past surgical history on file. Prior to Admission medications    Medication Sig Start Date End Date Taking? Authorizing Provider   amLODIPine (NORVASC) 5 mg tablet TAKE 1 TABLET EVERY DAY 7/19/17   Fara Mondragon MD   ondansetron (ZOFRAN ODT) 4 mg disintegrating tablet Take 1 Tab by mouth every eight (8) hours as needed for Nausea. 7/18/17   Fara Mondragon MD   LEVEMIR FLEXTOUCH 100 unit/mL (3 mL) inpn INJECT 20 UNITS SUBCUTANEOUSLY EVERY DAY 5/8/17   Fara Mondragon MD   spironolactone (ALDACTONE) 25 mg tablet TAKE 1 TABLET DAILY 10/28/16   Fara Mondragon MD   lisinopril (PRINIVIL, ZESTRIL) 40 mg tablet TAKE 1 TABLET DAILY 10/19/16   Fara Mondragon MD   carvedilol (COREG) 25 mg tablet TAKE 1 TABLET TWICE A DAY 10/19/16   Fara Mondragon MD   potassium chloride SR (KLOR-CON 10) 10 mEq tablet TAKE 1 TABLET DAILY 10/19/16   Fara Mondragon MD   doxazosin (CARDURA) 4 mg tablet TAKE 1 TABLET BY MOUTH ONCE DAILY 9/30/16   Fara Mondragon MD   BD INSULIN PEN NEEDLE UF MINI 31 gauge x 3/16\" ndle USE AS DIRECTED EVERY DAY 6/20/16   Fara Mondragon MD   furosemide (LASIX) 40 mg tablet TAKE 1 TABLET BY MOUTH TWICE DAILY 4/15/16   Fara Mondragon MD   aspirin delayed-release 81 mg tablet TAKE 1 TABLET BY MOUTH DAILY WITH BREAKFAST 8/6/13   Fara Mondragon MD     Allergies   Allergen Reactions    Flexeril [Cyclobenzaprine] Nausea Only    Gabapentin Unknown (comments)      Family History   Problem Relation Age of Onset    Hypertension Mother     Hypertension Sister       Family history reviewed and non-contributory. Social history  Patient resides  x  Independently      With family care      Assisted living      SNF    Ambulates  x  Independently      With cane       Assisted walker         Alcohol history   xx  None     Social     Chronic   Smoking history  x  None     Former smoker     Current smoker       Code status  x  Full code     DNR/DNI        Code status discussed with the patient/caregivers.   No Order    Review of systems  The patient denies any fever, chills, chest pain, cough, congestion, recent illness, palpitations, or dysuria. Constitutional: positive for chills, sweats, fatigue, malaise and weight loss  Eyes: positive for contacts/glasses  Ears, Nose, Mouth, Throat, and Face: negative  Respiratory: negative  Cardiovascular: negative  Gastrointestinal: positive for reflux symptoms, nausea, vomiting, diarrhea and abdominal pain  Genitourinary:negative  Integument/Breast: negative  Hematologic/Lymphatic: negative  Musculoskeletal:negative  Neurological: positive for dizziness and syncope   The remainder of the review of systems was reviewed and is noncontributory. Physical Examination   Visit Vitals    /83 (BP 1 Location: Right arm, BP Patient Position: Sitting)    Pulse 70    Temp 98.4 °F (36.9 °C)    Resp 16    SpO2 99%          O2 Device: Room air    General:  Alert, cooperative, no distress   Head:  Normocephalic, without obvious abnormality, atraumatic   Eyes:  Conjunctivae/corneas clear. PERRL, EOMs intact   E/N/M/T: Nares normal. Septum midline.  No nasal drainage or sinus tenderness  Lips, mucosa, and tongue normal   Teeth and gums normal  Clear oropharynx   Neck: Normal appearance and movements, symmetrical, trachea midline  No palpable adenopathy  No thyroid enlargement, tenderness or nodules  No carotid bruit   Normal JVP   Lungs:   Symmetrical chest expansion and respiratory effort  Clear to auscultation bilaterally   Chest wall:  No tenderness or deformity   Heart:  Regular rhythm   Sounds normal; no murmur, click, rub or gallop   Abdomen:   Soft, no tenderness  Bowel sounds normal  No masses or hepatosplenomegaly  No hernias present   Back: No CVA tenderness   Extremities: Extremities normal, atraumatic  No cyanosis or edema  No DVT signs   Pulses 2+ and symmetric all extremities   Skin: No rashes or ulcers   Musculo-      skeletal: Gait not tested  Normal symmetry, ROM, strength and tone   Neuro: Normal cranial nerves  Normal reflexes and sensation   Psych: Alert, oriented x3  Normal affect, judgement and insight   Geniturinary: Deferred      Data Review        24 Hour Results:  Recent Results (from the past 24 hour(s))   METABOLIC PANEL, COMPREHENSIVE    Collection Time: 07/21/17  6:45 PM   Result Value Ref Range    Sodium 138 136 - 145 mmol/L    Potassium 3.3 (L) 3.5 - 5.1 mmol/L    Chloride 102 97 - 108 mmol/L    CO2 23 21 - 32 mmol/L    Anion gap 13 5 - 15 mmol/L    Glucose 90 65 - 100 mg/dL    BUN 80 (H) 6 - 20 MG/DL    Creatinine 4.63 (H) 0.70 - 1.30 MG/DL    BUN/Creatinine ratio 17 12 - 20      GFR est AA 16 (L) >60 ml/min/1.73m2    GFR est non-AA 13 (L) >60 ml/min/1.73m2    Calcium 8.8 8.5 - 10.1 MG/DL    Bilirubin, total 0.8 0.2 - 1.0 MG/DL    ALT (SGPT) 182 (H) 12 - 78 U/L    AST (SGOT) 74 (H) 15 - 37 U/L    Alk. phosphatase 86 45 - 117 U/L    Protein, total 8.0 6.4 - 8.2 g/dL    Albumin 3.6 3.5 - 5.0 g/dL    Globulin 4.4 (H) 2.0 - 4.0 g/dL    A-G Ratio 0.8 (L) 1.1 - 2.2     CBC WITH AUTOMATED DIFF    Collection Time: 07/21/17  6:45 PM   Result Value Ref Range    WBC 7.7 4.1 - 11.1 K/uL    RBC 4.47 4.10 - 5.70 M/uL    HGB 11.9 (L) 12.1 - 17.0 g/dL    HCT 35.0 (L) 36.6 - 50.3 %    MCV 78.3 (L) 80.0 - 99.0 FL    MCH 26.6 26.0 - 34.0 PG    MCHC 34.0 30.0 - 36.5 g/dL    RDW 15.6 (H) 11.5 - 14.5 %    PLATELET 547 674 - 585 K/uL    NEUTROPHILS 56 32 - 75 %    LYMPHOCYTES 28 12 - 49 %    MONOCYTES 11 5 - 13 %    EOSINOPHILS 4 0 - 7 %    BASOPHILS 1 0 - 1 %    ABS. NEUTROPHILS 4.3 1.8 - 8.0 K/UL    ABS. LYMPHOCYTES 2.2 0.8 - 3.5 K/UL    ABS. MONOCYTES 0.8 0.0 - 1.0 K/UL    ABS. EOSINOPHILS 0.3 0.0 - 0.4 K/UL    ABS.  BASOPHILS 0.1 0.0 - 0.1 K/UL    DF SMEAR SCANNED      RBC COMMENTS NORMOCYTIC, NORMOCHROMIC     LACTIC ACID    Collection Time: 07/21/17  6:45 PM   Result Value Ref Range    Lactic acid 0.8 0.4 - 2.0 MMOL/L   URINALYSIS W/ REFLEX CULTURE    Collection Time: 07/21/17 8:43 PM   Result Value Ref Range    Color YELLOW/STRAW      Appearance CLEAR CLEAR      Specific gravity 1.010 1.003 - 1.030      pH (UA) 5.0 5.0 - 8.0      Protein 30 (A) NEG mg/dL    Glucose NEGATIVE  NEG mg/dL    Ketone NEGATIVE  NEG mg/dL    Bilirubin NEGATIVE  NEG      Blood NEGATIVE  NEG      Urobilinogen 0.2 0.2 - 1.0 EU/dL    Nitrites NEGATIVE  NEG      Leukocyte Esterase NEGATIVE  NEG      WBC 0-4 0 - 4 /hpf    RBC 0-5 0 - 5 /hpf    Epithelial cells FEW FEW /lpf    Bacteria 1+ (A) NEG /hpf    UA:UC IF INDICATED URINE CULTURE ORDERED (A) CNI     POC CHEM8    Collection Time: 07/21/17  9:27 PM   Result Value Ref Range    Calcium, ionized (POC) 1.15 1.12 - 1.32 MMOL/L    Sodium (POC) 141 136 - 145 MMOL/L    Potassium (POC) 3.4 (L) 3.5 - 5.1 MMOL/L    Chloride (POC) 111 (H) 98 - 107 MMOL/L    CO2 (POC) 21 21 - 32 MMOL/L    Anion gap (POC) 14 5 - 15 mmol/L    Glucose (POC) 77 65 - 100 MG/DL    BUN (POC) 70 (H) 9 - 20 MG/DL    Creatinine (POC) 4.0 (H) 0.6 - 1.3 MG/DL    GFRAA, POC 19 (L) >60 ml/min/1.73m2    GFRNA, POC 16 (L) >60 ml/min/1.73m2    Hemoglobin (POC) 10.5 (L) 12.1 - 17.0 GM/DL    Hematocrit (POC) 31 (L) 36.6 - 50.3 %    Comment Comment Not Indicated. Recent Labs      07/21/17   1845   WBC  7.7   HGB  11.9*   HCT  35.0*   PLT  208     Recent Labs      07/21/17   1845   NA  138   K  3.3*   CL  102   CO2  23   GLU  90   BUN  80*   CREA  4.63*   CA  8.8   ALB  3.6   TBILI  0.8   SGOT  74*   ALT  182*       Imaging  INDICATION:  abdominal pain      EXAM: Frontal view of the chest and 2 views of the abdomen. No comparisons.      FINDINGS: The cardiomediastinal silhouette is normal. Pulmonary vasculature is  not engorged. No pneumothorax, focal parenchymal opacity or effusion. There is  no free air. There are multiple air-fluid levels but no dilated loops of bowel. Metallic foreign body overlies the right pelvis. No abnormal calcifications.     IMPRESSION  IMPRESSION:  1.  No acute cardiopulmonary disease  2. Nonspecific air-fluid levels but no dilated loops of bowel         Assessment and Plan   1. Acute kidney injury- suspect secondary to dehydration with nausea vomiting, and diarrhea  - admit to inpatient  - IV hydration   - strict I/O daily weights  - consult nephrology  - trend chemistry   - renal us- evaluate kidney structure  - avoid nephrotoxins  2. GI - nausea/ vomiting suspect food poisoning ( he is a  at [employer]) vs gastro enteritis vs gastroparesis ( long standing diabetes)  - prn antiemetics  - PPI  - t/c consult GI for EGD  3. DM - fair control  - POC glucose AC/HS  - continue home insulin   - check hgb A1c  4. Syncope- most probably associated with dehydration vs vasovagal secondary to vomiting and diarrhea  - Echo   - orthostatic vs  - trend troponin  - Telemetry   5. Diarrhea- non  Bloody, suspect secondary to acute gastroenteritis  - stools for occult blood  - WBC, c-diff  - if no longer diarrhea then less likely c-diff  - will keep on contact precautions   - Hold antibiotics for now      Diet: diabetic   Activity:as tolerated   Consultations: Nephrology   DVT prophylaxis- SCD   Anticipated disposition:1-2 days         Signed by: Rick Colindres NP    July 22, 2017 at 12:34 AM       I have reviewed and agree with the above Plan.   Johnathan Moon MD

## 2017-07-23 ENCOUNTER — APPOINTMENT (OUTPATIENT)
Dept: ULTRASOUND IMAGING | Age: 52
DRG: 683 | End: 2017-07-23
Attending: NURSE PRACTITIONER
Payer: COMMERCIAL

## 2017-07-23 LAB
ALBUMIN SERPL BCP-MCNC: 3.2 G/DL (ref 3.5–5)
ALBUMIN/GLOB SERPL: 0.8 {RATIO} (ref 1.1–2.2)
ALP SERPL-CCNC: 89 U/L (ref 45–117)
ALT SERPL-CCNC: 191 U/L (ref 12–78)
ANION GAP BLD CALC-SCNC: 9 MMOL/L (ref 5–15)
AST SERPL W P-5'-P-CCNC: 89 U/L (ref 15–37)
BACTERIA SPEC CULT: NORMAL
BILIRUB SERPL-MCNC: 0.5 MG/DL (ref 0.2–1)
BUN SERPL-MCNC: 60 MG/DL (ref 6–20)
BUN/CREAT SERPL: 22 (ref 12–20)
C DIFF TOX GENS STL QL NAA+PROBE: NEGATIVE
CALCIUM SERPL-MCNC: 8.5 MG/DL (ref 8.5–10.1)
CC UR VC: NORMAL
CHLORIDE SERPL-SCNC: 110 MMOL/L (ref 97–108)
CO2 SERPL-SCNC: 21 MMOL/L (ref 21–32)
CREAT SERPL-MCNC: 2.71 MG/DL (ref 0.7–1.3)
ERYTHROCYTE [DISTWIDTH] IN BLOOD BY AUTOMATED COUNT: 16.1 % (ref 11.5–14.5)
EST. AVERAGE GLUCOSE BLD GHB EST-MCNC: 140 MG/DL
GLOBULIN SER CALC-MCNC: 4.1 G/DL (ref 2–4)
GLUCOSE BLD STRIP.AUTO-MCNC: 100 MG/DL (ref 65–100)
GLUCOSE BLD STRIP.AUTO-MCNC: 113 MG/DL (ref 65–100)
GLUCOSE BLD STRIP.AUTO-MCNC: 83 MG/DL (ref 65–100)
GLUCOSE BLD STRIP.AUTO-MCNC: 92 MG/DL (ref 65–100)
GLUCOSE SERPL-MCNC: 100 MG/DL (ref 65–100)
HBA1C MFR BLD: 6.5 % (ref 4.2–6.3)
HCT VFR BLD AUTO: 32 % (ref 36.6–50.3)
HEMOCCULT STL QL: NEGATIVE
HGB BLD-MCNC: 10.6 G/DL (ref 12.1–17)
MCH RBC QN AUTO: 25.9 PG (ref 26–34)
MCHC RBC AUTO-ENTMCNC: 33.1 G/DL (ref 30–36.5)
MCV RBC AUTO: 78 FL (ref 80–99)
PLATELET # BLD AUTO: 155 K/UL (ref 150–400)
POTASSIUM SERPL-SCNC: 3.3 MMOL/L (ref 3.5–5.1)
PROT SERPL-MCNC: 7.3 G/DL (ref 6.4–8.2)
RBC # BLD AUTO: 4.1 M/UL (ref 4.1–5.7)
SERVICE CMNT-IMP: ABNORMAL
SERVICE CMNT-IMP: NORMAL
SODIUM SERPL-SCNC: 140 MMOL/L (ref 136–145)
WBC # BLD AUTO: 6.4 K/UL (ref 4.1–11.1)
WBC #/AREA STL HPF: NORMAL /HPF (ref 0–4)

## 2017-07-23 PROCEDURE — 83036 HEMOGLOBIN GLYCOSYLATED A1C: CPT | Performed by: NURSE PRACTITIONER

## 2017-07-23 PROCEDURE — 74011636637 HC RX REV CODE- 636/637: Performed by: NURSE PRACTITIONER

## 2017-07-23 PROCEDURE — 74011250636 HC RX REV CODE- 250/636: Performed by: INTERNAL MEDICINE

## 2017-07-23 PROCEDURE — 80074 ACUTE HEPATITIS PANEL: CPT | Performed by: INTERNAL MEDICINE

## 2017-07-23 PROCEDURE — 82962 GLUCOSE BLOOD TEST: CPT

## 2017-07-23 PROCEDURE — 85027 COMPLETE CBC AUTOMATED: CPT | Performed by: INTERNAL MEDICINE

## 2017-07-23 PROCEDURE — 74011250637 HC RX REV CODE- 250/637: Performed by: INTERNAL MEDICINE

## 2017-07-23 PROCEDURE — 74011250636 HC RX REV CODE- 250/636: Performed by: NURSE PRACTITIONER

## 2017-07-23 PROCEDURE — 76770 US EXAM ABDO BACK WALL COMP: CPT

## 2017-07-23 PROCEDURE — 36415 COLL VENOUS BLD VENIPUNCTURE: CPT | Performed by: NURSE PRACTITIONER

## 2017-07-23 PROCEDURE — 74011250637 HC RX REV CODE- 250/637: Performed by: NURSE PRACTITIONER

## 2017-07-23 PROCEDURE — 65660000000 HC RM CCU STEPDOWN

## 2017-07-23 PROCEDURE — 80053 COMPREHEN METABOLIC PANEL: CPT | Performed by: INTERNAL MEDICINE

## 2017-07-23 RX ADMIN — ACETAMINOPHEN 650 MG: 650 SOLUTION ORAL at 07:02

## 2017-07-23 RX ADMIN — CARVEDILOL 25 MG: 12.5 TABLET, FILM COATED ORAL at 18:20

## 2017-07-23 RX ADMIN — POTASSIUM CHLORIDE 10 MEQ: 750 TABLET, FILM COATED, EXTENDED RELEASE ORAL at 09:50

## 2017-07-23 RX ADMIN — Medication 10 ML: at 06:57

## 2017-07-23 RX ADMIN — AMLODIPINE BESYLATE 5 MG: 5 TABLET ORAL at 09:50

## 2017-07-23 RX ADMIN — SODIUM CHLORIDE 100 ML/HR: 900 INJECTION, SOLUTION INTRAVENOUS at 06:57

## 2017-07-23 RX ADMIN — ASPIRIN 81 MG: 81 TABLET, COATED ORAL at 09:50

## 2017-07-23 RX ADMIN — Medication 5 ML: at 22:00

## 2017-07-23 RX ADMIN — SODIUM CHLORIDE 75 ML/HR: 900 INJECTION, SOLUTION INTRAVENOUS at 18:21

## 2017-07-23 RX ADMIN — CARVEDILOL 25 MG: 12.5 TABLET, FILM COATED ORAL at 07:02

## 2017-07-23 RX ADMIN — INSULIN GLARGINE 20 UNITS: 100 INJECTION, SOLUTION SUBCUTANEOUS at 10:17

## 2017-07-23 RX ADMIN — Medication 10 ML: at 14:20

## 2017-07-23 RX ADMIN — DOXAZOSIN MESYLATE 4 MG: 2 TABLET ORAL at 09:50

## 2017-07-23 RX ADMIN — FAMOTIDINE 20 MG: 20 TABLET ORAL at 09:50

## 2017-07-23 NOTE — PROGRESS NOTES
Bedside shift change report given to Guy Kennedy (oncoming nurse) by Kenisha Leigh RN (offgoing nurse). Report included the following information SBAR, Kardex, Intake/Output, MAR and Recent Results.

## 2017-07-23 NOTE — ROUTINE PROCESS
Bedside shift change report given to Maurilio Neil RN (oncoming nurse) by Fabricio Crocker RN (offgoing nurse). Report included the following information SBAR.

## 2017-07-23 NOTE — PROGRESS NOTES
Medical Progress Note      NAME: Aleisha Mg   :  1965  MRM:  [id number]    Date/Time: 2017           Problem List:     Active Problems:    Hypertension complicating diabetes ([address] (3/26/2017)      Type 2 diabetes mellitus with complication, with long-term current use of insulin ([address] (3/26/2017)      Insulin long-term use ([address] (3/26/2017)      Acute kidney injury ([address] (2017)      Stage 3 chronic kidney disease (2017)      Cardiomyopathy ([address] (2017)      Overview:  Echo: severe conc LVH. EF 25-30%. Cath : clean coronaries. [facility]             Subjective:     pcp cross cover. Renal and Cardiology c/s 's appreciated. Renal sonogram neg for hyro  Echo: nl EF     Last night after bland dinner , he had 3-4 diarrhea episodes with minor abdominal discomfort but w/o hematochezia. Then this am , had breakfast without difficulty. Denies sob,dizziness, cp. Past Medical History:   Diagnosis Date    Cardiomyopathy [facility]) 2012 Echo: severe conc LVH. EF 25-30%. Cath : clean coronaries. [facility] hospitalization for Hypertensive Emergency     Diabetes ([address]     Hypertension     Hypertensive emergency 2012    [facility]. 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, l1    Stage 3 chronic kidney disease 2017    Type I (juvenile type) diabetes mellitus without mention of complication, not stated as uncontrolled             Objective:         Vitals:      Last 24hrs VS reviewed since prior progress note.  Most recent are:    Visit Vitals    /84 (BP 1 Location: Right arm, BP Patient Position: At rest)    Pulse 63    Temp 98 °F (36.7 °C)    Resp 16    Wt 217 lb 9.6 oz (98.7 kg)    SpO2 100%    BMI 32.13 kg/m2     SpO2 Readings from Last 6 Encounters:   17 100%   17 99%   13 99%        No intake or output data in the 24 hours ending 17 1010               Exam:      General:  Alert, cooperative, no distress, appears stated age.     Lungs:   Clear to auscultation bilaterally. Heart:  Regular rate and rhythm, S1, S2 normal, no murmur, click, rub or gallop. Abdomen:   Soft, non-tender. Bowel sounds normal. No masses,  No organomegaly. Extremities:  no  edema. Lab Data Reviewed: (see below)  Recent Results (from the past 24 hour(s))   GLUCOSE, POC    Collection Time: 07/22/17 12:03 PM   Result Value Ref Range    Glucose (POC) 130 (H) 65 - 100 mg/dL    Performed by [address] [place] [address] [address], POC    Collection Time: 07/22/17  5:03 PM   Result Value Ref Range    Glucose (POC) 118 (H) 65 - 100 mg/dL    Performed by [facility] TRU(CON)    OCCULT BLOOD, STOOL    Collection Time: 07/22/17  6:27 PM   Result Value Ref Range    Occult blood, stool NEGATIVE  NEG     GLUCOSE, POC    Collection Time: 07/22/17  9:22 PM   Result Value Ref Range    Glucose (POC) 108 (H) 65 - 100 mg/dL    Performed by Joana Thorpe    HEMOGLOBIN A1C WITH EAG    Collection Time: 07/23/17  3:53 AM   Result Value Ref Range    Hemoglobin A1c 6.5 (H) 4.2 - 6.3 %    Est. average glucose 140 mg/dL   CBC W/O DIFF    Collection Time: 07/23/17  3:53 AM   Result Value Ref Range    WBC 6.4 4.1 - 11.1 K/uL    RBC 4.10 4. 10 - 5.70 M/uL    HGB 10.6 (L) 12.1 - 17.0 g/dL    HCT 32.0 (L) 36.6 - 50.3 %    MCV 78.0 (L) 80.0 - 99.0 FL    MCH 25.9 (L) 26.0 - 34.0 PG    MCHC 33.1 30.0 - 36.5 g/dL    RDW 16.1 (H) 11.5 - 14.5 %    PLATELET 527 436 - 263 K/uL   METABOLIC PANEL, COMPREHENSIVE    Collection Time: 07/23/17  3:53 AM   Result Value Ref Range    Sodium 140 136 - 145 mmol/L    Potassium 3.3 (L) 3.5 - 5.1 mmol/L    Chloride 110 (H) 97 - 108 mmol/L    CO2 21 21 - 32 mmol/L    Anion gap 9 5 - 15 mmol/L    Glucose 100 65 - 100 mg/dL    BUN 60 (H) 6 - 20 MG/DL    Creatinine 2.71 (H) 0.70 - 1.30 MG/DL    BUN/Creatinine ratio 22 (H) 12 - 20      GFR est AA 30 (L) >60 ml/min/1.73m2    GFR est non-AA 25 (L) >60 ml/min/1.73m2    Calcium 8.5 8.5 - 10.1 MG/DL    Bilirubin, total 0.5 0.2 - 1.0 MG/DL    ALT (SGPT) 191 (H) 12 - 78 U/L    AST (SGOT) 89 (H) 15 - 37 U/L    Alk. phosphatase 89 45 - 117 U/L    Protein, total 7.3 6.4 - 8.2 g/dL    Albumin 3.2 (L) 3.5 - 5.0 g/dL    Globulin 4.1 (H) 2.0 - 4.0 g/dL    A-G Ratio 0.8 (L) 1.1 - 2.2     GLUCOSE, POC    Collection Time: 07/23/17  6:54 AM   Result Value Ref Range    Glucose (POC) 92 65 - 100 mg/dL    Performed by Tom Crenshaw        Medications Reviewed: (see below)    ______________________________________________________________________    Medications:     Current Facility-Administered Medications   Medication Dose Route Frequency    amLODIPine (NORVASC) tablet 5 mg  5 mg Oral DAILY    aspirin delayed-release tablet 81 mg  81 mg Oral DAILY    carvedilol (COREG) tablet 25 mg  25 mg Oral BID WITH MEALS    doxazosin (CARDURA) tablet 4 mg  4 mg Oral DAILY    potassium chloride SR (KLOR-CON 10) tablet 10 mEq  10 mEq Oral DAILY    sodium chloride (NS) flush 5-10 mL  5-10 mL IntraVENous Q8H    sodium chloride (NS) flush 5-10 mL  5-10 mL IntraVENous PRN    acetaminophen (TYLENOL) solution 650 mg  650 mg Oral Q4H PRN    ondansetron (ZOFRAN) injection 4 mg  4 mg IntraVENous Q4H PRN    0.9% sodium chloride infusion  100 mL/hr IntraVENous CONTINUOUS    glucose chewable tablet 16 g  4 Tab Oral PRN    glucagon (GLUCAGEN) injection 1 mg  1 mg IntraMUSCular PRN    insulin lispro (HUMALOG) injection   SubCUTAneous AC&HS    dextrose 10% infusion 125-250 mL  125-250 mL IntraVENous PRN    insulin glargine (LANTUS) injection 20 Units  20 Units SubCUTAneous DAILY    famotidine (PEPCID) tablet 20 mg  20 mg Oral DAILY                   Assessment:   Acute probable infectious gastroenteritis. P: bland diet. F/u studies     ELMER on CKD with dehydration due to Gastroenteritis. , improving     Elevated LFTs . Suspect related to gastroenteritis. For completeness. Check Acute Hepatitis Panel     DM. Controlled. Probable Vol Depletion vs vagal syncope.  Follow Patient Active Problem List   Diagnosis Code    Hypertension complicating diabetes ([facility] E11.59, I10    Type 2 diabetes mellitus with complication, with long-term current use of insulin (H[facility]) E11.8, Z79.4    Insulin long-term use ([facility]) Z79.4    Acute kidney injury ([facility] N17.9    Stage 3 chronic kidney disease N18.3    Cardiomyopathy ([facility] I42.9          Plan:     As above. F/u labs.                                                         ___________________________________________________      Attending Physician: Vlad Soni MD

## 2017-07-23 NOTE — PROGRESS NOTES
Name: Bonifacio Gaytan   MRN: [id number]  : 1965      Assessment:  ELMER on CKD-3: ELMER due to n/v/d (Pre-renal azotemia). Renal US negative for obstruction. improving  CKD-3- due to DN + HTN  CHF- chronic systolic. EF of 20-25% back in  at [facility]; nl coronaries. Repeat ECHO this admit with normal EF  Mild Hypokalemia  HTN  N/V/D-improved. Did have diarrhea last night. Ate BF this am       Plan/Recommendations:  Ct IV NS- reduce rate to 75 ml/hr- taper further in 1-2 days  Hold ACE-I  No spironolactone- avoid even on d/c  Cautious prn kcl replacement  Daily labs  Will benefit from f/u as outpt for CKD    Subjective:  Resting. Some diarrhea last night. No blood. ROS:   No nausea, no vomiting  No chest pain, no shortness of breath    Exam:  Visit Vitals    /81 (BP 1 Location: Right arm, BP Patient Position: At rest)    Pulse 62    Temp 98.4 °F (36.9 °C)    Resp 18    Wt 98.7 kg (217 lb 9.6 oz)    SpO2 99%    BMI 32.13 kg/m2       NAD  Clear  RRR  Soft, NT  No edema  Resting.      Current Facility-Administered Medications   Medication Dose Route Frequency Last Dose    amLODIPine (NORVASC) tablet 5 mg  5 mg Oral DAILY 5 mg at 17 0950    aspirin delayed-release tablet 81 mg  81 mg Oral DAILY 81 mg at 17 0950    carvedilol (COREG) tablet 25 mg  25 mg Oral BID WITH MEALS 25 mg at 17 1730    doxazosin (CARDURA) tablet 4 mg  4 mg Oral DAILY 4 mg at 17 0950    potassium chloride SR (KLOR-CON 10) tablet 10 mEq  10 mEq Oral DAILY 10 mEq at 17 0950    sodium chloride (NS) flush 5-10 mL  5-10 mL IntraVENous Q8H 10 mL at 17 0657    sodium chloride (NS) flush 5-10 mL  5-10 mL IntraVENous PRN      acetaminophen (TYLENOL) solution 650 mg  650 mg Oral Q4H  mg at 17 0702    ondansetron (ZOFRAN) injection 4 mg  4 mg IntraVENous Q4H PRN      0.9% sodium chloride infusion  75 mL/hr IntraVENous CONTINUOUS 100 mL/hr at 07/23/17 0657    glucose chewable tablet 16 g  4 Tab Oral PRN      glucagon (GLUCAGEN) injection 1 mg  1 mg IntraMUSCular PRN      insulin lispro (HUMALOG) injection   SubCUTAneous AC&HS Stopped at 07/22/17 1130    dextrose 10% infusion 125-250 mL  125-250 mL IntraVENous PRN      insulin glargine (LANTUS) injection 20 Units  20 Units SubCUTAneous DAILY 20 Units at 07/23/17 1017    famotidine (PEPCID) tablet 20 mg  20 mg Oral DAILY 20 mg at 07/23/17 0950       Labs/Data:    Lab Results   Component Value Date/Time    WBC 6.4 07/23/2017 03:53 AM    Hemoglobin (POC) 10.5 07/21/2017 09:27 PM    HGB 10.6 07/23/2017 03:53 AM    Hematocrit (POC) 31 07/21/2017 09:27 PM    HCT 32.0 07/23/2017 03:53 AM    PLATELET 786 09/69/3639 03:53 AM    MCV 78.0 07/23/2017 03:53 AM       Lab Results   Component Value Date/Time    Sodium 140 07/23/2017 03:53 AM    Potassium 3.3 07/23/2017 03:53 AM    Chloride 110 07/23/2017 03:53 AM    CO2 21 07/23/2017 03:53 AM    Anion gap 9 07/23/2017 03:53 AM    Glucose 100 07/23/2017 03:53 AM    BUN 60 07/23/2017 03:53 AM    Creatinine 2.71 07/23/2017 03:53 AM    BUN/Creatinine ratio 22 07/23/2017 03:53 AM    GFR est AA 30 07/23/2017 03:53 AM    GFR est non-AA 25 07/23/2017 03:53 AM    Calcium 8.5 07/23/2017 03:53 AM       Wt Readings from Last 3 Encounters:   07/23/17 98.7 kg (217 lb 9.6 oz)   07/21/17 97.9 kg (215 lb 14.4 oz)   03/23/17 108.9 kg (240 lb)         Intake/Output Summary (Last 24 hours) at 07/23/17 1129  Last data filed at 07/23/17 0933   Gross per 24 hour   Intake              360 ml   Output                0 ml   Net              360 ml       Patient seen and examined. Chart reviewed. Labs, data and other pertinent notes reviewed in last 24 hrs.     PMH/SH/FH reviewed and unchanged compared to H&P    Discussed with pt      Sidney Ontiveros MD

## 2017-07-24 LAB
ALBUMIN SERPL BCP-MCNC: 3.1 G/DL (ref 3.5–5)
ALBUMIN/GLOB SERPL: 0.8 {RATIO} (ref 1.1–2.2)
ALP SERPL-CCNC: 92 U/L (ref 45–117)
ALT SERPL-CCNC: 196 U/L (ref 12–78)
ANION GAP BLD CALC-SCNC: 7 MMOL/L (ref 5–15)
AST SERPL W P-5'-P-CCNC: 83 U/L (ref 15–37)
BACTERIA SPEC CULT: NORMAL
BASOPHILS # BLD AUTO: 0.1 K/UL (ref 0–0.1)
BASOPHILS # BLD: 1 % (ref 0–1)
BILIRUB SERPL-MCNC: 0.5 MG/DL (ref 0.2–1)
BUN SERPL-MCNC: 37 MG/DL (ref 6–20)
BUN/CREAT SERPL: 19 (ref 12–20)
C JEJUNI+C COLI AG STL QL: NEGATIVE
CALCIUM SERPL-MCNC: 8.2 MG/DL (ref 8.5–10.1)
CHLORIDE SERPL-SCNC: 114 MMOL/L (ref 97–108)
CO2 SERPL-SCNC: 23 MMOL/L (ref 21–32)
CREAT SERPL-MCNC: 2 MG/DL (ref 0.7–1.3)
DIFFERENTIAL METHOD BLD: ABNORMAL
E COLI SXT1+2 STL IA: NEGATIVE
EOSINOPHIL # BLD: 0.2 K/UL (ref 0–0.4)
EOSINOPHIL NFR BLD: 4 % (ref 0–7)
ERYTHROCYTE [DISTWIDTH] IN BLOOD BY AUTOMATED COUNT: 16 % (ref 11.5–14.5)
GLOBULIN SER CALC-MCNC: 3.9 G/DL (ref 2–4)
GLUCOSE BLD STRIP.AUTO-MCNC: 103 MG/DL (ref 65–100)
GLUCOSE BLD STRIP.AUTO-MCNC: 146 MG/DL (ref 65–100)
GLUCOSE BLD STRIP.AUTO-MCNC: 87 MG/DL (ref 65–100)
GLUCOSE BLD STRIP.AUTO-MCNC: 94 MG/DL (ref 65–100)
GLUCOSE SERPL-MCNC: 125 MG/DL (ref 65–100)
HAV IGM SERPL QL IA: NONREACTIVE
HBV CORE IGM SER QL: NONREACTIVE
HBV SURFACE AG SER QL: <0.1 INDEX
HBV SURFACE AG SER QL: NEGATIVE
HCT VFR BLD AUTO: 31.5 % (ref 36.6–50.3)
HCV AB SERPL QL IA: NONREACTIVE
HCV COMMENT,HCGAC: NORMAL
HGB BLD-MCNC: 10.3 G/DL (ref 12.1–17)
LYMPHOCYTES # BLD AUTO: 37 % (ref 12–49)
LYMPHOCYTES # BLD: 2.1 K/UL (ref 0.8–3.5)
MCH RBC QN AUTO: 25.6 PG (ref 26–34)
MCHC RBC AUTO-ENTMCNC: 32.7 G/DL (ref 30–36.5)
MCV RBC AUTO: 78.2 FL (ref 80–99)
MONOCYTES # BLD: 0.8 K/UL (ref 0–1)
MONOCYTES NFR BLD AUTO: 13 % (ref 5–13)
NEUTS SEG # BLD: 2.6 K/UL (ref 1.8–8)
NEUTS SEG NFR BLD AUTO: 45 % (ref 32–75)
PLATELET # BLD AUTO: 153 K/UL (ref 150–400)
POTASSIUM SERPL-SCNC: 3.3 MMOL/L (ref 3.5–5.1)
PROT SERPL-MCNC: 7 G/DL (ref 6.4–8.2)
RBC # BLD AUTO: 4.03 M/UL (ref 4.1–5.7)
RBC MORPH BLD: ABNORMAL
SERVICE CMNT-IMP: ABNORMAL
SERVICE CMNT-IMP: ABNORMAL
SERVICE CMNT-IMP: NORMAL
SODIUM SERPL-SCNC: 144 MMOL/L (ref 136–145)
SP1: NORMAL
SP2: NORMAL
SP3: NORMAL
WBC # BLD AUTO: 5.8 K/UL (ref 4.1–11.1)

## 2017-07-24 PROCEDURE — 36415 COLL VENOUS BLD VENIPUNCTURE: CPT | Performed by: INTERNAL MEDICINE

## 2017-07-24 PROCEDURE — 80053 COMPREHEN METABOLIC PANEL: CPT | Performed by: INTERNAL MEDICINE

## 2017-07-24 PROCEDURE — 85025 COMPLETE CBC W/AUTO DIFF WBC: CPT | Performed by: INTERNAL MEDICINE

## 2017-07-24 PROCEDURE — 74011250637 HC RX REV CODE- 250/637: Performed by: INTERNAL MEDICINE

## 2017-07-24 PROCEDURE — 82962 GLUCOSE BLOOD TEST: CPT

## 2017-07-24 PROCEDURE — 74011636637 HC RX REV CODE- 636/637: Performed by: NURSE PRACTITIONER

## 2017-07-24 PROCEDURE — 65660000000 HC RM CCU STEPDOWN

## 2017-07-24 PROCEDURE — 74011250636 HC RX REV CODE- 250/636: Performed by: INTERNAL MEDICINE

## 2017-07-24 PROCEDURE — 74011250637 HC RX REV CODE- 250/637: Performed by: NURSE PRACTITIONER

## 2017-07-24 RX ORDER — POTASSIUM CHLORIDE 750 MG/1
20 TABLET, FILM COATED, EXTENDED RELEASE ORAL
Status: COMPLETED | OUTPATIENT
Start: 2017-07-24 | End: 2017-07-24

## 2017-07-24 RX ADMIN — SODIUM CHLORIDE 75 ML/HR: 900 INJECTION, SOLUTION INTRAVENOUS at 07:41

## 2017-07-24 RX ADMIN — INSULIN GLARGINE 20 UNITS: 100 INJECTION, SOLUTION SUBCUTANEOUS at 09:50

## 2017-07-24 RX ADMIN — POTASSIUM CHLORIDE 10 MEQ: 750 TABLET, FILM COATED, EXTENDED RELEASE ORAL at 09:50

## 2017-07-24 RX ADMIN — Medication 10 ML: at 13:30

## 2017-07-24 RX ADMIN — CARVEDILOL 25 MG: 12.5 TABLET, FILM COATED ORAL at 17:04

## 2017-07-24 RX ADMIN — ASPIRIN 81 MG: 81 TABLET, COATED ORAL at 09:50

## 2017-07-24 RX ADMIN — CARVEDILOL 25 MG: 12.5 TABLET, FILM COATED ORAL at 07:23

## 2017-07-24 RX ADMIN — Medication 10 ML: at 22:00

## 2017-07-24 RX ADMIN — FAMOTIDINE 20 MG: 20 TABLET ORAL at 09:50

## 2017-07-24 RX ADMIN — POTASSIUM CHLORIDE 20 MEQ: 750 TABLET, FILM COATED, EXTENDED RELEASE ORAL at 16:35

## 2017-07-24 RX ADMIN — DOXAZOSIN MESYLATE 4 MG: 2 TABLET ORAL at 09:50

## 2017-07-24 RX ADMIN — AMLODIPINE BESYLATE 5 MG: 5 TABLET ORAL at 09:50

## 2017-07-24 NOTE — PROGRESS NOTES
Chart reviewed. Pt has past medical history of cardiomyopathy, diabetes, hypertension, hypertensive emergency, neurological disorder, stage 3 chronic kidney disease, type I diabetes mellitus without mention of complication. Pt admitted with acute kidney injury. CM met with pt to introduce role of CM and discuss discharge needs. Pt lives with his filive Shanks ([phone]) in a private residence in [place]. Pt is independent with ADLs and uses no DME. Pt is currently employed. Confirmed insurance is [employer]. Pt gets medications at [facility] on [address]. PCP is Raymond Rivas MD ([phone]). Pt stated he saw PCP about a month ago. [facility] will transport pt home via car at discharge. CM will follow for discharge planning. Care Management Interventions  PCP Verified by CM: Yes Raymond Rivas MD)  Mode of Transport at Discharge:  Other (see comment) (family)  Transition of Care Consult (CM Consult): Discharge Planning  MyChart Signup: No  Discharge Durable Medical Equipment: No  Physical Therapy Consult: No  Occupational Therapy Consult: No  Speech Therapy Consult: No  Current Support Network: Lives with Spouse, Own Home  Confirm Follow Up Transport: Family  Plan discussed with Pt/Family/Caregiver: Yes  Discharge Location  Discharge Placement: Home     HOLA Blackwell/DAMIAN

## 2017-07-24 NOTE — PROGRESS NOTES
Bedside shift change report given to [facility] (oncoming nurse) by Esme Birch (offgoing nurse). Report included the following information SBAR, Kardex, Intake/Output and MAR.

## 2017-07-24 NOTE — PROGRESS NOTES
Joey Chavarria, Muriel Gusman, and Mode Hamlin Date: 7/21/2017      Subjective:     Patient feeling OK. No diarrhea overnight. Cr down to 2.0. .       Current Facility-Administered Medications   Medication Dose Route Frequency    amLODIPine (NORVASC) tablet 5 mg  5 mg Oral DAILY    aspirin delayed-release tablet 81 mg  81 mg Oral DAILY    carvedilol (COREG) tablet 25 mg  25 mg Oral BID WITH MEALS    doxazosin (CARDURA) tablet 4 mg  4 mg Oral DAILY    potassium chloride SR (KLOR-CON 10) tablet 10 mEq  10 mEq Oral DAILY    sodium chloride (NS) flush 5-10 mL  5-10 mL IntraVENous Q8H    sodium chloride (NS) flush 5-10 mL  5-10 mL IntraVENous PRN    acetaminophen (TYLENOL) solution 650 mg  650 mg Oral Q4H PRN    ondansetron (ZOFRAN) injection 4 mg  4 mg IntraVENous Q4H PRN    0.9% sodium chloride infusion  75 mL/hr IntraVENous CONTINUOUS    glucose chewable tablet 16 g  4 Tab Oral PRN    glucagon (GLUCAGEN) injection 1 mg  1 mg IntraMUSCular PRN    insulin lispro (HUMALOG) injection   SubCUTAneous AC&HS    dextrose 10% infusion 125-250 mL  125-250 mL IntraVENous PRN    insulin glargine (LANTUS) injection 20 Units  20 Units SubCUTAneous DAILY    famotidine (PEPCID) tablet 20 mg  20 mg Oral DAILY          Objective:     Patient Vitals for the past 8 hrs:   BP Temp Pulse Resp SpO2 Weight   07/24/17 0624 - - - - - 222 lb 3.2 oz (100.8 kg)   07/24/17 0455 162/90 98.3 °F (36.8 °C) 62 18 99 % -   07/23/17 2330 133/80 98.5 °F (36.9 °C) (!) 56 18 98 % -        07/22 1901 - 07/24 0700  In: 360 [P.O.:360]  Out: -     Physical Exam: Lungs: clear to auscultation bilaterally  Heart: regular rate and rhythm, S1, S2 normal, no murmur, click, rub or gallop  Abdomen: soft, non-tender.  Bowel sounds normal. No masses,  no organomegaly        Data Review   Recent Results (from the past 24 hour(s))   GLUCOSE, POC    Collection Time: 07/23/17 11:57 AM   Result Value Ref Range    Glucose (POC) 100 65 - 100 mg/dL Performed by Ira Melissa POC    Collection Time: 07/23/17  4:52 PM   Result Value Ref Range    Glucose (POC) 113 (H) 65 - 100 mg/dL    Performed by Gregory De    GLUCOSE, POC    Collection Time: 07/23/17  9:18 PM   Result Value Ref Range    Glucose (POC) 83 65 - 100 mg/dL    Performed by [facility] TRU(CON)    METABOLIC PANEL, COMPREHENSIVE    Collection Time: 07/24/17  5:01 AM   Result Value Ref Range    Sodium 144 136 - 145 mmol/L    Potassium 3.3 (L) 3.5 - 5.1 mmol/L    Chloride 114 (H) 97 - 108 mmol/L    CO2 23 21 - 32 mmol/L    Anion gap 7 5 - 15 mmol/L    Glucose 125 (H) 65 - 100 mg/dL    BUN 37 (H) 6 - 20 MG/DL    Creatinine 2.00 (H) 0.70 - 1.30 MG/DL    BUN/Creatinine ratio 19 12 - 20      GFR est AA 43 (L) >60 ml/min/1.73m2    GFR est non-AA 35 (L) >60 ml/min/1.73m2    Calcium 8.2 (L) 8.5 - 10.1 MG/DL    Bilirubin, total 0.5 0.2 - 1.0 MG/DL    ALT (SGPT) 196 (H) 12 - 78 U/L    AST (SGOT) 83 (H) 15 - 37 U/L    Alk. phosphatase 92 45 - 117 U/L    Protein, total 7.0 6.4 - 8.2 g/dL    Albumin 3.1 (L) 3.5 - 5.0 g/dL    Globulin 3.9 2.0 - 4.0 g/dL    A-G Ratio 0.8 (L) 1.1 - 2.2     CBC WITH AUTOMATED DIFF    Collection Time: 07/24/17  5:01 AM   Result Value Ref Range    WBC 5.8 4.1 - 11.1 K/uL    RBC 4.03 (L) 4.10 - 5.70 M/uL    HGB 10.3 (L) 12.1 - 17.0 g/dL    HCT 31.5 (L) 36.6 - 50.3 %    MCV 78.2 (L) 80.0 - 99.0 FL    MCH 25.6 (L) 26.0 - 34.0 PG    MCHC 32.7 30.0 - 36.5 g/dL    RDW 16.0 (H) 11.5 - 14.5 %    PLATELET 168 202 - 445 K/uL    NEUTROPHILS PENDING %    LYMPHOCYTES PENDING %    MONOCYTES PENDING %    EOSINOPHILS PENDING %    BASOPHILS PENDING %    ABS. NEUTROPHILS PENDING K/UL    ABS. LYMPHOCYTES PENDING K/UL    ABS. MONOCYTES PENDING K/UL    ABS. EOSINOPHILS PENDING K/UL    ABS.  BASOPHILS PENDING K/UL    DF PENDING    GLUCOSE, POC    Collection Time: 07/24/17  6:20 AM   Result Value Ref Range    Glucose (POC) 87 65 - 100 mg/dL    Performed by CHUCHO MARCUS            Assessment: Active Problems:    Hypertension complicating diabetes ([facility] [address] (3/26/2017)      Type 2 diabetes mellitus with complication, with long-term current use of insulin ([facility] [address] (3/26/2017)      Insulin long-term use ([facility] [address] (3/26/2017)      [facility] (7/22/2017)      Stage 3 chronic kidney disease (7/22/2017)      Cardiomyopathy ([facility] [address] (7/22/2017)      Overview: 2012 Echo: severe conc LVH. EF 25-30%. Cath : clean coronaries. [facility]        Plan:     1) Continues to improve renal functions.    Observe bowel function

## 2017-07-24 NOTE — CDMP QUERY
Patient is noted to have a BMI of  32.81. Please clarify if this patient is: The [address] [place] [address] has issued a statement indicating that, \"Individuals who are overweight, obese, or morbidly obese are at an increased risk for certain medical conditions when compared to persons of normal weight. Therefore, these conditions are always clinically significant and reportable when documented by the provider. \"    =>Obesity (BMI of 30-39.9)  => Morbid Obesity  (BMI 40 or greater)  =>Overweight (BMI 25-29.9)  => Other weight status (specify  status)  => Unable to determine        Presentation: ht 5' 9\" Wt 222lbs BMI 32.81          Please clarify and document your clinical opinion in the progress notes and discharge summary, including the definitive and or presumptive diagnosis, (suspected or probable), related to the above clinical findings. Please include clinical findings supporting your diagnosis.      Thank [address] [place] [address] W. Ajith Dillon, [address] [place] [address]     [phone]

## 2017-07-24 NOTE — PROGRESS NOTES
Name: Corinne Barbone   MRN: [id number]  : 1965      Assessment:  ELMER on CKD-3: Cr improving-> close to baseline now. ELMER due to n/v/d (Pre-renal azotemia). Renal US negative for obstruction. 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 EF of 20-25% back in  at [facility]; nl coronaries. Repeat ECHO this admit with normal EF  Mild Hypokalemia  HTN  N/V/D-improved.        Plan/Recommendations:  Decrease IV NS to KVO  Push free water  Hold ACE-I  No spironolactone- avoid even on d/c now that EF has normalized  Additional KCl 20meq x1 dose  Daily labs  Will benefit from f/u as outpt for CKD    Subjective:  No events overnight. Feels better. No abd pain. No more n/v     ROS:   No nausea, no vomiting  No chest pain, no shortness of breath    Exam:  Visit Vitals    /83 (BP 1 Location: Right arm, BP Patient Position: At rest)    Pulse 64    Temp 98.1 °F (36.7 °C)    Resp 18    Wt 100.8 kg (222 lb 3.2 oz)    SpO2 98%    BMI 32.81 kg/m2       NAD  Clear  RRR  Soft, NT  No edema  Resting.      Current Facility-Administered Medications   Medication Dose Route Frequency Last Dose    potassium chloride SR (KLOR-CON 10) tablet 20 mEq  20 mEq Oral NOW      amLODIPine (NORVASC) tablet 5 mg  5 mg Oral DAILY 5 mg at 17 0950    aspirin delayed-release tablet 81 mg  81 mg Oral DAILY 81 mg at 17 0950    carvedilol (COREG) tablet 25 mg  25 mg Oral BID WITH MEALS 25 mg at 17 0723    doxazosin (CARDURA) tablet 4 mg  4 mg Oral DAILY 4 mg at 17 0950    potassium chloride SR (KLOR-CON 10) tablet 10 mEq  10 mEq Oral DAILY 10 mEq at 17 0950    sodium chloride (NS) flush 5-10 mL  5-10 mL IntraVENous Q8H 10 mL at 17 1330    sodium chloride (NS) flush 5-10 mL  5-10 mL IntraVENous PRN      acetaminophen (TYLENOL) solution 650 mg  650 mg Oral Q4H  mg at 07/23/17 0702    ondansetron (ZOFRAN) injection 4 mg  4 mg IntraVENous Q4H PRN      0.9% sodium chloride infusion  25 mL/hr IntraVENous CONTINUOUS 75 mL/hr at 07/24/17 0741    glucose chewable tablet 16 g  4 Tab Oral PRN      glucagon (GLUCAGEN) injection 1 mg  1 mg IntraMUSCular PRN      insulin lispro (HUMALOG) injection   SubCUTAneous AC&HS Stopped at 07/22/17 1130    dextrose 10% infusion 125-250 mL  125-250 mL IntraVENous PRN      insulin glargine (LANTUS) injection 20 Units  20 Units SubCUTAneous DAILY 20 Units at 07/24/17 0950    famotidine (PEPCID) tablet 20 mg  20 mg Oral DAILY 20 mg at 07/24/17 0950       Labs/Data:    Lab Results   Component Value Date/Time    WBC 5.8 07/24/2017 05:01 AM    Hemoglobin (POC) 10.5 07/21/2017 09:27 PM    HGB 10.3 07/24/2017 05:01 AM    Hematocrit (POC) 31 07/21/2017 09:27 PM    HCT 31.5 07/24/2017 05:01 AM    PLATELET 518 29/15/7919 05:01 AM    MCV 78.2 07/24/2017 05:01 AM       Lab Results   Component Value Date/Time    Sodium 144 07/24/2017 05:01 AM    Potassium 3.3 07/24/2017 05:01 AM    Chloride 114 07/24/2017 05:01 AM    CO2 23 07/24/2017 05:01 AM    Anion gap 7 07/24/2017 05:01 AM    Glucose 125 07/24/2017 05:01 AM    BUN 37 07/24/2017 05:01 AM    Creatinine 2.00 07/24/2017 05:01 AM    BUN/Creatinine ratio 19 07/24/2017 05:01 AM    GFR est AA 43 07/24/2017 05:01 AM    GFR est non-AA 35 07/24/2017 05:01 AM    Calcium 8.2 07/24/2017 05:01 AM       Wt Readings from Last 3 Encounters:   07/24/17 100.8 kg (222 lb 3.2 oz)   07/21/17 97.9 kg (215 lb 14.4 oz)   03/23/17 108.9 kg (240 lb)         Intake/Output Summary (Last 24 hours) at 07/24/17 1535  Last data filed at 07/24/17 0919   Gross per 24 hour   Intake              360 ml   Output                0 ml   Net              360 ml       Patient seen and examined. Chart reviewed. Labs, data and other pertinent notes reviewed in last 24 hrs.     PMH/SH/FH reviewed and unchanged compared to H&P    Discussed with pt and RN      Geronimo Arceo MD

## 2017-07-25 VITALS
HEART RATE: 57 BPM | RESPIRATION RATE: 18 BRPM | BODY MASS INDEX: 33.03 KG/M2 | OXYGEN SATURATION: 98 % | DIASTOLIC BLOOD PRESSURE: 87 MMHG | SYSTOLIC BLOOD PRESSURE: 158 MMHG | TEMPERATURE: 98 F | WEIGHT: 223.7 LBS

## 2017-07-25 PROBLEM — A05.9 FOOD POISONING: Status: ACTIVE | Noted: 2017-07-25

## 2017-07-25 LAB
ANION GAP BLD CALC-SCNC: 7 MMOL/L (ref 5–15)
BUN SERPL-MCNC: 27 MG/DL (ref 6–20)
BUN/CREAT SERPL: 16 (ref 12–20)
CALCIUM SERPL-MCNC: 8.6 MG/DL (ref 8.5–10.1)
CHLORIDE SERPL-SCNC: 111 MMOL/L (ref 97–108)
CO2 SERPL-SCNC: 25 MMOL/L (ref 21–32)
CREAT SERPL-MCNC: 1.74 MG/DL (ref 0.7–1.3)
GLUCOSE BLD STRIP.AUTO-MCNC: 120 MG/DL (ref 65–100)
GLUCOSE BLD STRIP.AUTO-MCNC: 92 MG/DL (ref 65–100)
GLUCOSE SERPL-MCNC: 94 MG/DL (ref 65–100)
MAGNESIUM SERPL-MCNC: 1.8 MG/DL (ref 1.6–2.4)
PHOSPHATE SERPL-MCNC: 2.6 MG/DL (ref 2.6–4.7)
POTASSIUM SERPL-SCNC: 3.5 MMOL/L (ref 3.5–5.1)
SERVICE CMNT-IMP: ABNORMAL
SERVICE CMNT-IMP: NORMAL
SODIUM SERPL-SCNC: 143 MMOL/L (ref 136–145)

## 2017-07-25 PROCEDURE — 74011250637 HC RX REV CODE- 250/637: Performed by: INTERNAL MEDICINE

## 2017-07-25 PROCEDURE — 82962 GLUCOSE BLOOD TEST: CPT

## 2017-07-25 PROCEDURE — 74011636637 HC RX REV CODE- 636/637: Performed by: NURSE PRACTITIONER

## 2017-07-25 PROCEDURE — 83735 ASSAY OF MAGNESIUM: CPT | Performed by: INTERNAL MEDICINE

## 2017-07-25 PROCEDURE — 74011250636 HC RX REV CODE- 250/636: Performed by: INTERNAL MEDICINE

## 2017-07-25 PROCEDURE — 80048 BASIC METABOLIC PNL TOTAL CA: CPT | Performed by: INTERNAL MEDICINE

## 2017-07-25 PROCEDURE — 74011250637 HC RX REV CODE- 250/637: Performed by: NURSE PRACTITIONER

## 2017-07-25 PROCEDURE — 36415 COLL VENOUS BLD VENIPUNCTURE: CPT | Performed by: INTERNAL MEDICINE

## 2017-07-25 PROCEDURE — 84100 ASSAY OF PHOSPHORUS: CPT | Performed by: INTERNAL MEDICINE

## 2017-07-25 RX ORDER — LISINOPRIL 20 MG/1
20 TABLET ORAL DAILY
Status: DISCONTINUED | OUTPATIENT
Start: 2017-07-26 | End: 2017-07-25 | Stop reason: HOSPADM

## 2017-07-25 RX ORDER — LISINOPRIL 40 MG/1
20 TABLET ORAL DAILY
Qty: 90 TAB | Refills: 3 | Status: SHIPPED | OUTPATIENT
Start: 2017-07-25 | End: 2017-08-10

## 2017-07-25 RX ORDER — POTASSIUM CHLORIDE 750 MG/1
40 TABLET, FILM COATED, EXTENDED RELEASE ORAL
Status: COMPLETED | OUTPATIENT
Start: 2017-07-25 | End: 2017-07-25

## 2017-07-25 RX ADMIN — ASPIRIN 81 MG: 81 TABLET, COATED ORAL at 09:20

## 2017-07-25 RX ADMIN — POTASSIUM CHLORIDE 10 MEQ: 750 TABLET, FILM COATED, EXTENDED RELEASE ORAL at 09:22

## 2017-07-25 RX ADMIN — AMLODIPINE BESYLATE 5 MG: 5 TABLET ORAL at 09:21

## 2017-07-25 RX ADMIN — FAMOTIDINE 20 MG: 20 TABLET ORAL at 09:21

## 2017-07-25 RX ADMIN — DOXAZOSIN MESYLATE 4 MG: 2 TABLET ORAL at 09:21

## 2017-07-25 RX ADMIN — POTASSIUM CHLORIDE 40 MEQ: 750 TABLET, FILM COATED, EXTENDED RELEASE ORAL at 13:41

## 2017-07-25 RX ADMIN — SODIUM CHLORIDE 25 ML/HR: 900 INJECTION, SOLUTION INTRAVENOUS at 12:17

## 2017-07-25 RX ADMIN — INSULIN GLARGINE 20 UNITS: 100 INJECTION, SOLUTION SUBCUTANEOUS at 09:30

## 2017-07-25 RX ADMIN — CARVEDILOL 25 MG: 12.5 TABLET, FILM COATED ORAL at 07:31

## 2017-07-25 NOTE — PROGRESS NOTES
Joey Packer, Jeff Deleon, and Corey Vanessa Date: 7/21/2017      Subjective:     Patient feeling well today. Has been ambulating and eating OK. No diarrhea. Cr- 1.7, K-3.5. Current Facility-Administered Medications   Medication Dose Route Frequency    amLODIPine (NORVASC) tablet 5 mg  5 mg Oral DAILY    aspirin delayed-release tablet 81 mg  81 mg Oral DAILY    carvedilol (COREG) tablet 25 mg  25 mg Oral BID WITH MEALS    doxazosin (CARDURA) tablet 4 mg  4 mg Oral DAILY    potassium chloride SR (KLOR-CON 10) tablet 10 mEq  10 mEq Oral DAILY    sodium chloride (NS) flush 5-10 mL  5-10 mL IntraVENous Q8H    sodium chloride (NS) flush 5-10 mL  5-10 mL IntraVENous PRN    acetaminophen (TYLENOL) solution 650 mg  650 mg Oral Q4H PRN    ondansetron (ZOFRAN) injection 4 mg  4 mg IntraVENous Q4H PRN    0.9% sodium chloride infusion  25 mL/hr IntraVENous CONTINUOUS    glucose chewable tablet 16 g  4 Tab Oral PRN    glucagon (GLUCAGEN) injection 1 mg  1 mg IntraMUSCular PRN    insulin lispro (HUMALOG) injection   SubCUTAneous AC&HS    dextrose 10% infusion 125-250 mL  125-250 mL IntraVENous PRN    insulin glargine (LANTUS) injection 20 Units  20 Units SubCUTAneous DAILY    famotidine (PEPCID) tablet 20 mg  20 mg Oral DAILY          Objective:     Patient Vitals for the past 8 hrs:   BP Temp Pulse Resp SpO2 Weight   07/25/17 0636 - - - - - 223 lb 11.2 oz (101.5 kg)   07/25/17 0455 166/87 97.9 °F (36.6 °C) 68 18 - -   07/24/17 2342 174/86 98.5 °F (36.9 °C) 62 18 98 % -        07/23 1901 - 07/25 0700  In: 600 [P.O.:600]  Out: 2625 [Urine:2625]    Physical Exam: Lungs: clear to auscultation bilaterally  Heart: regular rate and rhythm, S1, S2 normal, no murmur, click, rub or gallop  Abdomen: soft, non-tender.  Bowel sounds normal. No masses,  no organomegaly        Data Review   Recent Results (from the past 24 hour(s))   GLUCOSE, POC    Collection Time: 07/24/17 12:24 PM   Result Value Ref Range    Glucose (POC) 103 (H) 65 - 100 mg/dL    Performed by Evin Jackson, POC    Collection Time: 07/24/17  5:44 PM   Result Value Ref Range    Glucose (POC) 94 65 - 100 mg/dL    Performed by Nikunj Michel    GLUCOSE, POC    Collection Time: 07/24/17 11:49 PM   Result Value Ref Range    Glucose (POC) 146 (H) 65 - 100 mg/dL    Performed by [facility] TRU(CON)    METABOLIC PANEL, BASIC    Collection Time: 07/25/17  5:02 AM   Result Value Ref Range    Sodium 143 136 - 145 mmol/L    Potassium 3.5 3.5 - 5.1 mmol/L    Chloride 111 (H) 97 - 108 mmol/L    CO2 25 21 - 32 mmol/L    Anion gap 7 5 - 15 mmol/L    Glucose 94 65 - 100 mg/dL    BUN 27 (H) 6 - 20 MG/DL    Creatinine 1.74 (H) 0.70 - 1.30 MG/DL    BUN/Creatinine ratio 16 12 - 20      GFR est AA 50 (L) >60 ml/min/1.73m2    GFR est non-AA 41 (L) >60 ml/min/1.73m2    Calcium 8.6 8.5 - 10.1 MG/DL   MAGNESIUM    Collection Time: 07/25/17  5:02 AM   Result Value Ref Range    Magnesium 1.8 1.6 - 2.4 mg/dL   PHOSPHORUS    Collection Time: 07/25/17  5:02 AM   Result Value Ref Range    Phosphorus 2.6 2.6 - 4.7 MG/DL   GLUCOSE, POC    Collection Time: 07/25/17  6:17 AM   Result Value Ref Range    Glucose (POC) 92 65 - 100 mg/dL    Performed by [facility] TRU(CON)            Assessment:     Active Problems:    Hypertension complicating diabetes ([facility] [address] (3/26/2017)      Type 2 diabetes mellitus with complication, with long-term current use of insulin ([facility]) (3/26/2017)      Insulin long-term use ([facility] [address] (3/26/2017)      Acute kidney injury ([facility] [address] (7/22/2017)      Stage 3 chronic kidney disease (7/22/2017)      Cardiomyopathy ([facility] [address] (7/22/2017)      Overview: 2012 Echo: severe conc LVH. EF 25-30%. Cath : clean coronaries. [facility]        Plan:     1) Think is at baseline. Can go home today if OK with Nephrology.

## 2017-07-25 NOTE — PROGRESS NOTES
Bedside shift change report given to ENRIQUE Bennett (oncoming nurse) by Kenneth Manriquez (offgoing nurse). Report included the following information SBAR, Kardex, MAR and Recent Results.

## 2017-07-25 NOTE — DISCHARGE INSTRUCTIONS
Patient Discharge Instructions    Olga Bowser / [id number] : 1965    Admitted 2017 Discharged: 2017     Take Home Medications            · It is important that you take the medication exactly as they are prescribed. · Keep your medication in the bottles provided by the pharmacist and keep a list of the medication names, dosages, and times to be taken in your wallet. · Do not take other medications without consulting your doctor. What to do at Home    Recommended diet: Diabetic Diet,     Recommended activity: Activity as tolerated,     If you experience any of the following symptoms diarrhea, malaise, please follow up with Dr. Noe Portillo. Follow-up Appointments   Procedures    FOLLOW UP VISIT Appointment in: Two Weeks Dr. Noe Portillo     Standing Status:   Standing     Number of Occurrences:   1     Order Specific Question:   Appointment in     Answer: Two Weeks            Information obtained by :  I understand that if any problems occur once I am at home I am to contact my physician. I understand and acknowledge receipt of the instructions indicated above. Physician's or R.N.'s Signature                                                                  Date/Time                                                                                                                                              Patient or Representative Signature                                                          Date/Time    Medallia Activation    Thank you for requesting access to [employer]. Please follow the instructions below to securely access and download your online medical record. Medallia allows you to send messages to your doctor, view your test results, renew your prescriptions, schedule appointments, and more. How Do I Sign Up? 1.  In your internet browser, go to [email or web address]  2. Click on the First Time User? Click Here link in the Sign In box. You will be redirect to the New Member Sign Up page. 3. Enter your Edi[employer] Access Code exactly as it appears below. You will not need to use this code after youve completed the sign-up process. If you do not sign up before the expiration date, you must request a new code. [email or web address].io Access Code: [email or web address]  Expires: 10/19/2017 11:33 PM (This is the date your Edi[employer]io access code will )    4. Enter the last four digits of your Social Security Number (xxxx) and Date of Birth (mm/dd/yyyy) as indicated and click Submit. You will be taken to the next sign-up page. 5. Create a Edi.io ID. This will be your Ed[employer].io login ID and cannot be changed, so think of one that is secure and easy to remember. 6. Create a Ed[employer] password. You can change your password at any time. 7. Enter your Password Reset Question and Answer. This can be used at a later time if you forget your password. 8. Enter your e-mail address. You will receive e-mail notification when new information is available in [address] 9. Click Sign Up. You can now view and download portions of your medical record. 10. Click the Download Summary menu link to download a portable copy of your medical information. Additional Information    If you have questions, please visit the Frequently Asked Questions section of the [email or web address] website at [email or web address] Remember, [email or web address] is NOT to be used for urgent needs. For medical emergencies, dial 911.       DISCHARGE SUMMARY from Nurse    The following personal items are in your possession at time of discharge:    Dental Appliances: None  Visual Aid: Glasses     Home Medications: None  Jewelry: Bracelet, Ring, With patient, Watch  Clothing: Undergarments, With patient, Socks, Slippers, Shorts  Other Valuables: None             PATIENT INSTRUCTIONS:    After general anesthesia or intravenous sedation, for 24 hours or while taking prescription Narcotics:  · Limit your activities  · Do not drive and operate hazardous machinery  · Do not make important personal or business decisions  · Do  not drink alcoholic beverages  · If you have not urinated within 8 hours after discharge, please contact your surgeon on call. Report the following to your surgeon:  · Excessive pain, swelling, redness or odor of or around the surgical area  · Temperature over 100.5  · Nausea and vomiting lasting longer than 4 hours or if unable to take medications  · Any signs of decreased circulation or nerve impairment to extremity: change in color, persistent  numbness, tingling, coldness or increase pain  · Any questions        What to do at Home:        *  Please give a list of your current medications to your Primary Care Provider. *  Please update this list whenever your medications are discontinued, doses are      changed, or new medications (including over-the-counter products) are added. *  Please carry medication information at all times in case of emergency situations. These are general instructions for a healthy lifestyle:    No smoking/ No tobacco products/ Avoid exposure to second hand smoke    Surgeon General's Warning:  Quitting smoking now greatly reduces serious risk to your health. Obesity, smoking, and sedentary lifestyle greatly increases your risk for illness    A healthy diet, regular physical exercise & weight monitoring are important for maintaining a healthy lifestyle    You may be retaining fluid if you have a history of heart failure or if you experience any of the following symptoms:  Weight gain of 3 pounds or more overnight or 5 pounds in a week, increased swelling in our hands or feet or shortness of breath while lying flat in bed.   Please call your doctor as soon as you notice any of these symptoms; do not wait until your next office visit. Recognize signs and symptoms of STROKE:    F-face looks uneven    A-arms unable to move or move unevenly    S-speech slurred or non-existent    T-time-call 911 as soon as signs and symptoms begin-DO NOT go       Back to bed or wait to see if you get better-TIME IS BRAIN. Warning Signs of HEART ATTACK     Call 911 if you have these symptoms:   Chest discomfort. Most heart attacks involve discomfort in the center of the chest that lasts more than a few minutes, or that goes away and comes back. It can feel like uncomfortable pressure, squeezing, fullness, or pain.  Discomfort in other areas of the upper body. Symptoms can include pain or discomfort in one or both arms, the back, neck, jaw, or stomach.  Shortness of breath with or without chest discomfort.  Other signs may include breaking out in a cold sweat, nausea, or lightheadedness. Don't wait more than five minutes to call 911 - MINUTES MATTER! Fast action can save your life. Calling 911 is almost always the fastest way to get lifesaving treatment. Emergency Medical Services staff can begin treatment when they arrive -- up to an hour sooner than if someone gets to the hospital by car. The discharge information has been reviewed with the patient. The patient verbalized understanding. Discharge medications reviewed with the patient and appropriate educational materials and side effects teaching were provided.

## 2017-07-25 NOTE — PROGRESS NOTES
WORK NOTE      Catrina Emanuel (-61) needs to be out of work for medical reasons from 17 - 17 for medical reasons. He may return to regular duty on 17.      Juliann Abebe MD

## 2017-07-25 NOTE — PROGRESS NOTES
Name: Rachel Pool   MRN: [id number]  : 1965      Assessment:  ELMER on CKD-3: Cr improving-> Down to 1.7mg/dl. ELMER due to n/v/d (Pre-renal azotemia). Renal US negative for obstruction. 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 EF of 20-25% back in  at [facility]; nl coronaries. Repeat ECHO this admit with normal EF  Mild Hypokalemia  HTN  N/V/D-improved.        Plan/Recommendations:  Stable for discharge from renal standpoint  Outpatient Nephrology f/u if deemed necessary by Dr. Sharyl Hodgkin  Additional KCl 40meq x1 dose today  Can resume Lisinopril at 20mg daily on discharge-> would advise repeat BMP in 1-2weeks  No spironolactone- avoid even on d/c now that EF has normalized    Subjective:  No events overnight. Feels better. No abd pain. No more n/v     ROS:   No nausea, no vomiting  No chest pain, no shortness of breath    Exam:  Visit Vitals    /84    Pulse (!) 58    Temp 98.4 °F (36.9 °C)    Resp 18    Wt 101.5 kg (223 lb 11.2 oz)    SpO2 98%    BMI 33.03 kg/m2       NAD  Clear  RRR  Soft, NT  No edema  Resting.      Current Facility-Administered Medications   Medication Dose Route Frequency Last Dose    potassium chloride SR (KLOR-CON 10) tablet 40 mEq  40 mEq Oral NOW      amLODIPine (NORVASC) tablet 5 mg  5 mg Oral DAILY 5 mg at 17 9739    aspirin delayed-release tablet 81 mg  81 mg Oral DAILY 81 mg at 17 0920    carvedilol (COREG) tablet 25 mg  25 mg Oral BID WITH MEALS 25 mg at 17 0731    doxazosin (CARDURA) tablet 4 mg  4 mg Oral DAILY 4 mg at 17 3325    potassium chloride SR (KLOR-CON 10) tablet 10 mEq  10 mEq Oral DAILY 10 mEq at 17 7178    sodium chloride (NS) flush 5-10 mL  5-10 mL IntraVENous Q8H 10 mL at 17 2200    sodium chloride (NS) flush 5-10 mL  5-10 mL IntraVENous PRN      acetaminophen (TYLENOL) solution 650 mg  650 mg Oral Q4H  mg at 07/23/17 0702    ondansetron (ZOFRAN) injection 4 mg  4 mg IntraVENous Q4H PRN      0.9% sodium chloride infusion  25 mL/hr IntraVENous CONTINUOUS 25 mL/hr at 07/25/17 1217    glucose chewable tablet 16 g  4 Tab Oral PRN      glucagon (GLUCAGEN) injection 1 mg  1 mg IntraMUSCular PRN      insulin lispro (HUMALOG) injection   SubCUTAneous AC&HS Stopped at 07/22/17 1130    dextrose 10% infusion 125-250 mL  125-250 mL IntraVENous PRN      insulin glargine (LANTUS) injection 20 Units  20 Units SubCUTAneous DAILY 20 Units at 07/25/17 0930    famotidine (PEPCID) tablet 20 mg  20 mg Oral DAILY 20 mg at 07/25/17 5682       Labs/Data:    Lab Results   Component Value Date/Time    WBC 5.8 07/24/2017 05:01 AM    Hemoglobin (POC) 10.5 07/21/2017 09:27 PM    HGB 10.3 07/24/2017 05:01 AM    Hematocrit (POC) 31 07/21/2017 09:27 PM    HCT 31.5 07/24/2017 05:01 AM    PLATELET 963 19/95/4816 05:01 AM    MCV 78.2 07/24/2017 05:01 AM       Lab Results   Component Value Date/Time    Sodium 143 07/25/2017 05:02 AM    Potassium 3.5 07/25/2017 05:02 AM    Chloride 111 07/25/2017 05:02 AM    CO2 25 07/25/2017 05:02 AM    Anion gap 7 07/25/2017 05:02 AM    Glucose 94 07/25/2017 05:02 AM    BUN 27 07/25/2017 05:02 AM    Creatinine 1.74 07/25/2017 05:02 AM    BUN/Creatinine ratio 16 07/25/2017 05:02 AM    GFR est AA 50 07/25/2017 05:02 AM    GFR est non-AA 41 07/25/2017 05:02 AM    Calcium 8.6 07/25/2017 05:02 AM       Wt Readings from Last 3 Encounters:   07/25/17 101.5 kg (223 lb 11.2 oz)   07/21/17 97.9 kg (215 lb 14.4 oz)   03/23/17 108.9 kg (240 lb)         Intake/Output Summary (Last 24 hours) at 07/25/17 1244  Last data filed at 07/25/17 1216   Gross per 24 hour   Intake              480 ml   Output             3525 ml   Net            -3045 ml       Patient seen and examined. Chart reviewed. Labs, data and other pertinent notes reviewed in last 24 hrs.     PMH/SH/FH reviewed and unchanged compared to H&P    Discussed with pt and RN      Mayco Montero MD

## 2017-07-25 NOTE — PROGRESS NOTES
Bedside shift change report given to ENRIQUE Haney (oncoming nurse) by Clara Weldon RN (offgoing nurse). Report included the following information SBAR, Kardex, Intake/Output, MAR and Recent Results.

## 2017-07-26 LAB
BACTERIA SPEC CULT: NORMAL
SERVICE CMNT-IMP: NORMAL

## 2017-07-26 NOTE — ADT AUTH CERT NOTES
Patient Demographics        Patient Name 70 [address] Peterson Sex  Address Phone       Corene Seat [phone] Male 1965 [address] [phone] (Home)  [phone] (Mobile) *Preferred*           CSN:       [id number]           Admit Date: Admit Time Room Bed       2017 11:38  [33598] 02 [[id number]]           Attending Providers        Provider Pager From To       Sue Ramachandran MD  17       Yelena Diaz MD  17       Bettie Chou MD  17           Emergency Contact(s)        Name Relation Home Work Mobile       Michelle Reddy Spouse [phone]           Utilization Review           Renal Failure, Acute - Care Day 3 (2017) by Patsy Mcdaniel RN        Review Entered Review Status       2017 Completed       Details              Care Day: 3 Care Date: 2017 Level of Care: Telemetry       Guideline Day 2        Level Of Care       (X) Floor       2017 2:23 PM EDT by Jean Pierre Welch         CONTACT HAYLEY CLARKE                     Clinical Status       ( ) * Electrolyte abnormalities absent or improved       (X) * Acid-base abnormalities absent or improved       (X) * Hemodynamic stability       2017 2:23 PM EDT by Jean Pierre Welch         98.1, HR 64, R 18, 171/83, 98%RA                     Activity       (X) Activity as tolerated              Routes       (X) Parenteral or oral hydration       (X) Parenteral or oral medications       2017 2:23 PM EDT by Jean Pierre Welch         NS 75ML/HR IV, NORVASC PO, ASPIRIN PO, COREG PO, CARDURA PO, PEPCID PO, LANTUS 20 UNITS SC, HUMALOG SS SC, KLOR CON PO              (X) Renal diet as tolerated       2017 2:23 PM EDT by Jean Pierre Welch         DIABETIC                     Interventions       (X) Monitor electrolytes, renal function tests, acid-base, and volume status       2017 2:23 PM EDT by Jean Pierre Welch         RBC 4.03, HGB 10.3, HCT 31.5, MCV 78.2, MCH 25.6, RDW 16, K 3.3, CHLOR 114, , BUN 37, CREAT 2, CA 8.2, ALB 3.1, , AST 83                     Medications       (X) Possible medical therapies                                   * Milestone              Additional Notes       Attending:       Subjective:               Patient feeling OK. No diarrhea overnight. Cr down to 2.0. .        Assessment:               Active Problems:         Hypertension complicating diabetes ([facility] [address] (3/26/2017)                 Type 2 diabetes mellitus with complication, with long-term current use of insulin ([facility] [address] (3/26/2017)                 Insulin long-term use ([facility] [address] [facility] (3/26/2017)                 Acute kidney injury ([facility] [address] (7/22/2017)                 Stage 3 chronic kidney disease (7/22/2017)                 Cardiomyopathy ([facility] [address] (7/22/2017)           Overview: 2012 Echo: severe conc LVH. EF 25-30%. Cath : clean coronaries.  [facility]                               Plan:               1) Continues to improve renal functions.        Observe bowel function           Renal Failure, Acute - Care Day 2 (7/23/2017) by Mariaelena Johnson RN        Review Entered Review Status       7/24/2017 Completed       Details              Care Day: 2 Care Date: 7/23/2017 Level of Care: Telemetry       Guideline Day 2        Level Of Care       (X) Floor       7/24/2017 10:38 AM EDT by "[facility]         TELE                     Clinical Status       ( ) * Electrolyte abnormalities absent or improved       (X) * Acid-base abnormalities absent or improved       (X) * Hemodynamic stability       7/24/2017 10:38 AM EDT by "[facility]         97.7, HR 59, R 16, 99%RA, 131/82                     Activity       (X) Activity as tolerated              Routes       (X) Parenteral or oral hydration       7/24/2017 10:38 AM EDT by "[facility]         NS 75ML/HR IV              (X) Parenteral or oral medications       7/24/2017 10:38 AM EDT by "[facility]         TYLENOL PO, ASPIRIN PO, COREG PO, CARDURA PO, PEPCID PO, LANTUS 20 UNITS SC, KLOR CON PO              (X) Renal diet as tolerated       7/24/2017 10:38 AM EDT by Joie Ham         DIABETIC                     Interventions       (X) Monitor electrolytes, renal function tests, acid-base, and volume status       7/24/2017 10:38 AM EDT by Joie Ham         HGB 10.6, HCT 32, MCV 78, MCH 25.9, RDW 16.1, K 3.3, CHLOR 110, BUN 60, CREAT 2.71, ALB 3.2, GLOB 4.1, , AST 89.                     Medications       (X) Possible medical therapies              7/24/2017 10:38 AM EDT by Joie Ham       Subject: Additional Clinical Information       US:1. Echogenic kidneys compatible with medical renal disease. No hydronephrosis                                   * Milestone              Additional Notes       HOSPITALIST:       Problem List:               Active Problems:         Hypertension complicating diabetes ([address] (3/26/2017)                 Type 2 diabetes mellitus with complication, with long-term current use of insulin ([address] (3/26/2017)                 Insulin long-term use ([address] (3/26/2017)                 Acute kidney injury ([address] (7/22/2017)                 Stage 3 chronic kidney disease (7/22/2017)                 Cardiomyopathy ([address] (7/22/2017)           Overview: 2012 Echo: severe conc LVH. EF 25-30%. Cath : clean coronaries. [facility]                                               Subjective:               pcp cross cover.                Renal and Cardiology c/s 's appreciated.        Renal sonogram neg for hyro       Echo: nl EF                Last night after bland dinner , he had 3-4 diarrhea episodes with minor abdominal discomfort but w/o hematochezia. Then this am , had breakfast without difficulty. Denies sob,dizziness, cp.        Assessment:       Acute probable infectious gastroenteritis. P: bland diet. F/u studies                ELMER on CKD with dehydration due to Gastroenteritis.  , improving                Elevated LFTs . Suspect related to gastroenteritis. For completeness.        Check Acute Hepatitis Panel                DM. Controlled.                Probable Vol Depletion vs vagal syncope.                        PLAN: F/U LABS              NEPHRO:              Assessment:       ELMER on CKD-3: ELMER due to n/v/d (Pre-renal azotemia). Renal US negative for obstruction. improving       CKD-3- due to DN + HTN       CHF- chronic systolic. EF of 20-25% back in 2012 at [facility]; nl coronaries. Repeat ECHO this admit with normal EF       Mild Hypokalemia       HTN       N/V/D-improved. Did have diarrhea last night. Ate BF this am                        Plan/Recommendations:       Ct IV NS- reduce rate to 75 ml/hr- taper further in 1-2 days       Hold ACE-I       No spironolactone- avoid even on d/c       Cautious prn kcl replacement       Daily labs       Will benefit from f/u as outpt for CKD               Subjective:       Resting. Some diarrhea last night. No blood.

## 2017-07-27 LAB
BACTERIA SPEC CULT: ABNORMAL
SERVICE CMNT-IMP: ABNORMAL

## 2017-08-04 ENCOUNTER — APPOINTMENT (OUTPATIENT)
Dept: CT IMAGING | Age: 52
DRG: 312 | End: 2017-08-04
Payer: COMMERCIAL

## 2017-08-04 ENCOUNTER — HOSPITAL ENCOUNTER (INPATIENT)
Age: 52
LOS: 6 days | Discharge: HOME | DRG: 312 | End: 2017-08-10
Attending: EMERGENCY MEDICINE | Admitting: INTERNAL MEDICINE
Payer: COMMERCIAL

## 2017-08-04 ENCOUNTER — APPOINTMENT (OUTPATIENT)
Dept: GENERAL RADIOLOGY | Age: 52
DRG: 312 | End: 2017-08-04
Payer: COMMERCIAL

## 2017-08-04 DIAGNOSIS — N17.9 ACUTE RENAL FAILURE, UNSPECIFIED ACUTE RENAL FAILURE TYPE (HCC): Primary | ICD-10-CM

## 2017-08-04 DIAGNOSIS — R55 SYNCOPE AND COLLAPSE: ICD-10-CM

## 2017-08-04 LAB
ALBUMIN SERPL BCP-MCNC: 3 G/DL (ref 3.5–5)
ALBUMIN/GLOB SERPL: 0.6 {RATIO} (ref 1.1–2.2)
ALP SERPL-CCNC: 121 U/L (ref 45–117)
ALT SERPL-CCNC: 142 U/L (ref 12–78)
ANION GAP BLD CALC-SCNC: 7 MMOL/L (ref 5–15)
AST SERPL W P-5'-P-CCNC: 60 U/L (ref 15–37)
BASOPHILS # BLD AUTO: 0.1 K/UL
BASOPHILS # BLD: 1 %
BILIRUB SERPL-MCNC: 0.6 MG/DL (ref 0.2–1)
BNP SERPL-MCNC: 1186 PG/ML (ref 0–125)
BUN SERPL-MCNC: 31 MG/DL (ref 6–20)
BUN/CREAT SERPL: 10 (ref 12–20)
CALCIUM SERPL-MCNC: 8.7 MG/DL (ref 8.5–10.1)
CHLORIDE SERPL-SCNC: 106 MMOL/L (ref 97–108)
CK SERPL-CCNC: 246 U/L (ref 39–308)
CO2 SERPL-SCNC: 25 MMOL/L (ref 21–32)
CREAT SERPL-MCNC: 3 MG/DL (ref 0.7–1.3)
EOSINOPHIL # BLD: 0.4 K/UL
EOSINOPHIL NFR BLD: 5 %
ERYTHROCYTE [DISTWIDTH] IN BLOOD BY AUTOMATED COUNT: 16.1 % (ref 11.5–14.5)
GLOBULIN SER CALC-MCNC: 4.8 G/DL (ref 2–4)
GLUCOSE BLD STRIP.AUTO-MCNC: 247 MG/DL (ref 65–100)
GLUCOSE SERPL-MCNC: 82 MG/DL (ref 65–100)
HCT VFR BLD AUTO: 31 % (ref 36.6–50.3)
HGB BLD-MCNC: 10.2 G/DL (ref 12.1–17)
LIPASE SERPL-CCNC: 56 U/L (ref 73–393)
LYMPHOCYTES # BLD AUTO: 31 %
LYMPHOCYTES # BLD: 2.3 K/UL
MCH RBC QN AUTO: 26.2 PG (ref 26–34)
MCHC RBC AUTO-ENTMCNC: 32.9 G/DL (ref 30–36.5)
MCV RBC AUTO: 79.5 FL (ref 80–99)
MONOCYTES # BLD: 0.4 K/UL
MONOCYTES NFR BLD AUTO: 6 %
NEUTS SEG # BLD: 4.2 K/UL
NEUTS SEG NFR BLD AUTO: 57 %
PLATELET # BLD AUTO: 320 K/UL (ref 150–400)
POTASSIUM SERPL-SCNC: 4.3 MMOL/L (ref 3.5–5.1)
PROT SERPL-MCNC: 7.8 G/DL (ref 6.4–8.2)
RBC # BLD AUTO: 3.9 M/UL (ref 4.1–5.7)
RBC MORPH BLD: ABNORMAL
SERVICE CMNT-IMP: ABNORMAL
SODIUM SERPL-SCNC: 138 MMOL/L (ref 136–145)
TROPONIN I SERPL-MCNC: <0.04 NG/ML
WBC # BLD AUTO: 7.4 K/UL (ref 4.1–11.1)
WBC MORPH BLD: ABNORMAL

## 2017-08-04 PROCEDURE — 72125 CT NECK SPINE W/O DYE: CPT

## 2017-08-04 PROCEDURE — 82550 ASSAY OF CK (CPK): CPT | Performed by: PHYSICIAN ASSISTANT

## 2017-08-04 PROCEDURE — 70450 CT HEAD/BRAIN W/O DYE: CPT

## 2017-08-04 PROCEDURE — 73562 X-RAY EXAM OF KNEE 3: CPT

## 2017-08-04 PROCEDURE — 84484 ASSAY OF TROPONIN QUANT: CPT | Performed by: PHYSICIAN ASSISTANT

## 2017-08-04 PROCEDURE — 74011250636 HC RX REV CODE- 250/636: Performed by: EMERGENCY MEDICINE

## 2017-08-04 PROCEDURE — 72100 X-RAY EXAM L-S SPINE 2/3 VWS: CPT

## 2017-08-04 PROCEDURE — 80053 COMPREHEN METABOLIC PANEL: CPT | Performed by: PHYSICIAN ASSISTANT

## 2017-08-04 PROCEDURE — 82962 GLUCOSE BLOOD TEST: CPT

## 2017-08-04 PROCEDURE — 85025 COMPLETE CBC W/AUTO DIFF WBC: CPT | Performed by: PHYSICIAN ASSISTANT

## 2017-08-04 PROCEDURE — 74011250636 HC RX REV CODE- 250/636: Performed by: PHYSICIAN ASSISTANT

## 2017-08-04 PROCEDURE — 83880 ASSAY OF NATRIURETIC PEPTIDE: CPT | Performed by: PHYSICIAN ASSISTANT

## 2017-08-04 PROCEDURE — 93005 ELECTROCARDIOGRAM TRACING: CPT

## 2017-08-04 PROCEDURE — 83690 ASSAY OF LIPASE: CPT | Performed by: PHYSICIAN ASSISTANT

## 2017-08-04 PROCEDURE — 99284 EMERGENCY DEPT VISIT MOD MDM: CPT

## 2017-08-04 PROCEDURE — 90715 TDAP VACCINE 7 YRS/> IM: CPT | Performed by: PHYSICIAN ASSISTANT

## 2017-08-04 PROCEDURE — 74011250637 HC RX REV CODE- 250/637: Performed by: PHYSICIAN ASSISTANT

## 2017-08-04 PROCEDURE — 90471 IMMUNIZATION ADMIN: CPT

## 2017-08-04 PROCEDURE — 65660000000 HC RM CCU STEPDOWN

## 2017-08-04 PROCEDURE — 36415 COLL VENOUS BLD VENIPUNCTURE: CPT | Performed by: PHYSICIAN ASSISTANT

## 2017-08-04 RX ORDER — ACETAMINOPHEN 325 MG/1
650 TABLET ORAL
Status: DISCONTINUED | OUTPATIENT
Start: 2017-08-04 | End: 2017-08-10 | Stop reason: HOSPADM

## 2017-08-04 RX ORDER — FACIAL-BODY WIPES
10 EACH TOPICAL DAILY PRN
Status: DISCONTINUED | OUTPATIENT
Start: 2017-08-04 | End: 2017-08-10 | Stop reason: HOSPADM

## 2017-08-04 RX ORDER — SODIUM CHLORIDE 0.9 % (FLUSH) 0.9 %
5-10 SYRINGE (ML) INJECTION EVERY 8 HOURS
Status: DISCONTINUED | OUTPATIENT
Start: 2017-08-05 | End: 2017-08-10 | Stop reason: HOSPADM

## 2017-08-04 RX ORDER — SODIUM CHLORIDE 0.9 % (FLUSH) 0.9 %
5-10 SYRINGE (ML) INJECTION EVERY 8 HOURS
Status: DISCONTINUED | OUTPATIENT
Start: 2017-08-04 | End: 2017-08-07 | Stop reason: SDUPTHER

## 2017-08-04 RX ORDER — OXYCODONE AND ACETAMINOPHEN 5; 325 MG/1; MG/1
1 TABLET ORAL
Status: DISCONTINUED | OUTPATIENT
Start: 2017-08-04 | End: 2017-08-10 | Stop reason: HOSPADM

## 2017-08-04 RX ORDER — ONDANSETRON 2 MG/ML
4 INJECTION INTRAMUSCULAR; INTRAVENOUS
Status: DISCONTINUED | OUTPATIENT
Start: 2017-08-04 | End: 2017-08-10 | Stop reason: HOSPADM

## 2017-08-04 RX ORDER — NALOXONE HYDROCHLORIDE 0.4 MG/ML
0.4 INJECTION, SOLUTION INTRAMUSCULAR; INTRAVENOUS; SUBCUTANEOUS AS NEEDED
Status: DISCONTINUED | OUTPATIENT
Start: 2017-08-04 | End: 2017-08-10 | Stop reason: HOSPADM

## 2017-08-04 RX ORDER — SODIUM CHLORIDE 0.9 % (FLUSH) 0.9 %
5-10 SYRINGE (ML) INJECTION AS NEEDED
Status: DISCONTINUED | OUTPATIENT
Start: 2017-08-04 | End: 2017-08-10 | Stop reason: HOSPADM

## 2017-08-04 RX ORDER — ENOXAPARIN SODIUM 100 MG/ML
30 INJECTION SUBCUTANEOUS EVERY 24 HOURS
Status: DISCONTINUED | OUTPATIENT
Start: 2017-08-05 | End: 2017-08-06

## 2017-08-04 RX ORDER — SODIUM CHLORIDE 0.9 % (FLUSH) 0.9 %
5-10 SYRINGE (ML) INJECTION AS NEEDED
Status: DISCONTINUED | OUTPATIENT
Start: 2017-08-04 | End: 2017-08-07 | Stop reason: SDUPTHER

## 2017-08-04 RX ADMIN — TETANUS TOXOID, REDUCED DIPHTHERIA TOXOID AND ACELLULAR PERTUSSIS VACCINE, ADSORBED 0.5 ML: 5; 2.5; 8; 8; 2.5 SUSPENSION INTRAMUSCULAR at 21:08

## 2017-08-04 RX ADMIN — SODIUM CHLORIDE 1000 ML: 900 INJECTION, SOLUTION INTRAVENOUS at 22:35

## 2017-08-04 RX ADMIN — BACITRACIN ZINC, NEOMYCIN SULFATE, POLYMYXIN B SULFATE 1 PACKET: 3.5; 5000; 4 OINTMENT TOPICAL at 21:07

## 2017-08-04 RX ADMIN — Medication 10 ML: at 21:19

## 2017-08-04 RX ADMIN — SODIUM CHLORIDE 1000 ML: 900 INJECTION, SOLUTION INTRAVENOUS at 20:40

## 2017-08-04 NOTE — IP AVS SNAPSHOT
[address] [phone] Patient: Jules So MRN: [id number] :1965 You are allergic to the following Allergen Reactions Flexeril (Cyclobenzaprine) Nausea Only Patient states he is not allergic Gabapentin Unknown (comments) Patient states he is not allergic Immunizations Administered for This Admission Name Date Tdap 2017 Recent Documentation Height Weight BMI Smoking Status 1.753 m 98.4 kg 32.05 kg/m2 Former Smoker Emergency Contacts  (Rel.) Home Phone Work Phone Mobile Phone Roselyn Lesch (Girlfriend) [phone] -- -- About your hospitalization You were admitted on:  2017 You last received care in the:  [facility] 1 MEDICAL ONCOLOGY You were discharged on:  August 10, 2017 Why you were hospitalized Your primary diagnosis was:  Not on File Your diagnoses also included:  Syncope And Collapse Providers Seen During Your Hospitalizations Provider Role Specialty Primary office phone John White MD Attending Provider Emergency Medicine [phone] Harvey Villegas MD Attending Provider Hospitalist [phone] Pavan Cain MD Attending Provider Internal Medicine [phone] Wallace De MD Attending Provider Internal Medicine [phone] Your Primary Care Physician (PCP) Primary Care Physician Office Phone Office Fax Ernesto Garrett [phone] Follow-up Information Follow up With Details Comments Contact Info Verenice Acosta MD In 1 week  [address] [address] 
[phone] Ariela Winchester MD In 2 weeks 2-3 weeks, earlier if diarrhea re cure  [address] [facility] [address] [phone] Aftab Orellana MD In 1 month  [address] [facility] [address] [place] [address] 
[phone] Current Discharge Medication List  
  
START taking these medications Dose & Instructions Dispensing Information Comments Morning Noon Evening Bedtime  
 loperamide 2 mg capsule Commonly known as:  IMODIUM Your last dose was: Your next dose is:    
   
   
 Dose:  4 mg Take 2 Caps by mouth three (3) times daily as needed for Diarrhea for up to 10 days. Quantity:  20 Cap Refills:  0  
     
   
   
   
  
 oxyCODONE-acetaminophen 5-325 mg per tablet Commonly known as:  PERCOCET Your last dose was: Your next dose is:    
   
   
 Dose:  1 Tab Take 1 Tab by mouth every six (6) hours as needed. Max Daily Amount: 4 Tabs. Quantity:  30 Tab Refills:  0  
     
   
   
   
  
 predniSONE 10 mg tablet Commonly known as:  Ildamary Marks Your last dose was: Your next dose is: Take 40 mg PO daily x 3 days Quantity:  12 Tab Refills:  0  
     
   
   
   
  
 silver sulfADIAZINE 1 % topical cream  
Commonly known as:  SILVADENE Your last dose was: Your next dose is:    
   
   
 Apply  to affected area daily. Quantity:  50 g Refills:  0 CONTINUE these medications which have CHANGED Dose & Instructions Dispensing Information Comments Morning Noon Evening Bedtime  
 carvedilol 6.25 mg tablet Commonly known as:  Tylor Rome What changed:  See the new instructions. Your last dose was: Your next dose is:    
   
   
 Dose:  6.25 mg Take 1 Tab by mouth two (2) times daily (with meals). Quantity:  60 Tab Refills:  0  
     
   
   
   
  
 insulin detemir 100 unit/mL (3 mL) Inpn Commonly known as:  Raza Goldman What changed:  See the new instructions. Your last dose was: Your next dose is:    
   
   
 Dose:  10 Units 10 Units by SubCUTAneous route nightly. Quantity:  15 Adjustable Dose Pre-filled Pen Syringe Refills:  1 CONTINUE these medications which have NOT CHANGED Dose & Instructions Dispensing Information Comments Morning Noon Evening Bedtime  
 aspirin delayed-release 81 mg tablet Your last dose was: Your next dose is: TAKE 1 TABLET BY MOUTH DAILY WITH BREAKFAST Quantity:  30 Tab Refills:  5 BD INSULIN PEN NEEDLE UF MINI 31 gauge x 3/16\" Ndle Generic drug:  Insulin Needles (Disposable) Your last dose was: Your next dose is: USE AS DIRECTED EVERY DAY Quantity:  100 Pen Needle Refills:  1 STOP taking these medications   
 amLODIPine 5 mg tablet Commonly known as:  NORVASC  
   
  
 doxazosin 4 mg tablet Commonly known as:  CARDURA lisinopril 40 mg tablet Commonly known as:  PRINIVIL, ZESTRIL  
   
  
 potassium chloride SR 10 mEq tablet Commonly known as:  KLOR-CON 10 Where to Get Your Medications Information on where to get these meds will be given to you by the nurse or doctor. ! Ask your nurse or doctor about these medications  
  carvedilol 6.25 mg tablet  
 insulin detemir 100 unit/mL (3 mL) Inpn  
 loperamide 2 mg capsule  
 oxyCODONE-acetaminophen 5-325 mg per tablet  
 predniSONE 10 mg tablet  
 silver sulfADIAZINE 1 % topical cream  
  
  
 
  
  
Discharge Instructions Patient Discharge Instructions aDfne Humphrey / [id number] : 1965 Admitted 2017 Discharged: 8/10/2017 DISCHARGE DIAGNOSIS:  
 
Diarrhea - unknown etiology at present. Colonoscopy was normal including biopsy  
              - follow with GI in the office 2-3 weeks or earlier if diarrhea re cure Low blood pressure due to dehydration  
             - do not take Norvasc/ lisinopril for now.  Follow with PCP next week to re assess blood pressure. PCP should check orthostatics ( blood pressure laying down and standing up) - coreg dose was decreased for now. Follow with PCP next week to re assess Gout flare up - continue home medications as previously - prednisone was prescribed for flair up Chronic kidney disease - baseline cr is 1.6 
                                      - follow with kidney doctor DM - blood sugars were low side while in the hospital  
       - take Lantus only 10 units for now. Follow blood sugar at home and write it down Discuss with PCP next week if lantus dose need to be adjusted Take Home Medications General drug facts If you have a very bad allergy, wear an allergy ID at all times. It is important that you take the medication exactly as they are prescribed. Keep your medication in the bottles provided by the pharmacist. 
Keep a list of all your drugs (prescription, natural products, vitamins, OTC) with you. Give this list to your doctor. Do not take other medications without consulting your doctor. Do not share your drugs with others and do not take anyone else's drugs. Keep all drugs out of the reach of children and pets. Most drugs may be thrown away in household trash after mixing with coffee grounds or christian litter and sealing in a plastic bag. Keep a list Call your doctor for help with any side effects. If in the [place] you may also call the FDA at 3-583-VKR-2090 Talk with the doctor before starting any new drug, including OTC, natural products, or vitamins. What to do at [facility] 1. Recommended diet:diabetic 2. Recommended activity: Activity as tolerated 3. If you experience any of the following symptoms then please call your primary care physician or return to the emergency room if you cannot get hold of your doctor: fever/chills/ diarrhea/ passing out/ dizziness 4. Wound Care :Left mid back burn: daily cleanse with NS, wipe wound bed clean. Apply Silvadene cream to wound and cover with a non-adherent dressing like Mepilex Foam of telfa and tape. 5. Lab work: with PCP 6. Bring these papers with you to your follow up appointments. The papers will help your doctors be sure to continue the care plan from the hospital. 
 
 
Follow-up with:  
PCP: Vijaya Villegas MD 
Follow-up Information Follow up With Details Comments Contact Info Vijaya Villegas MD In 1 week  [address] [facility] [address] [place] [address] 
[phone] Mary Khan MD In 2 weeks 2-3 weeks, earlier if diarrhea re cure  [address] [facility] [address] [facility] 
[phone] Please call for your own appointment Information obtained by : 
I understand that if any problems occur once I am at home I am to contact my physician. I understand and acknowledge receipt of the instructions indicated above. Physician's or R.N.'s Signature                                                                  Date/Time Patient or Representative Signature                                                          Date/Time Discharge Orders None [facility] Announcement We are excited to announce that we are making your provider's discharge notes available to you in Venyo. You will see these notes when they are completed and signed by the physician that discharged you from your recent hospital stay.   If you have any questions or concerns about any information you see in Venyo, please call the Health Information Department where you were seen or reach out to your Primary Care Provider for more information about your plan of care. Introducing [address] Erick Small introduces [employer]odo patient portal. Now you can access parts of your medical record, email your doctor's office, and request medication refills online. 1. In your internet browser, go to [email or web address] 2. Click on the First Time User? Click Here link in the Sign In box. You will see the New Member Sign Up page. 3. Enter your Luxodo Access Code exactly as it appears below. You will not need to use this code after youve completed the sign-up process. If you do not sign up before the expiration date, you must request a new code. · Luxodo Access Code: [email or web address] Expires: 10/19/2017 11:33 PM 
 
4. Enter the last four digits of your Social Security Number (xxxx) and Date of Birth (mm/dd/yyyy) as indicated and click Submit. You will be taken to the next sign-up page. 5. Create a Luxodo ID. This will be your Luxodo login ID and cannot be changed, so think of one that is secure and easy to remember. 6. Create a Luxodo password. You can change your password at any time. 7. Enter your Password Reset Question and Answer. This can be used at a later time if you forget your password. 8. Enter your e-mail address. You will receive e-mail notification when new information is available in [address] 9. Click Sign Up. You can now view and download portions of your medical record. 10. Click the Download Summary menu link to download a portable copy of your medical information. If you have questions, please visit the Frequently Asked Questions section of the [employer]odo website. Remember, [employer]odo is NOT to be used for urgent needs. For medical emergencies, dial 911. Now available from your iPhone and Android! General Information Please provide this summary of care documentation to your next provider.  
  
  
    
    
 Patient Signature: ____________________________________________________________ Date:  ____________________________________________________________  
  
Arna Hamida Provider Signature:  ____________________________________________________________ Date:  ____________________________________________________________

## 2017-08-04 NOTE — ED PROVIDER NOTES
HPI Comments: Hali Bryson, 46 y.o. male with significant PMHx for HTN, DM, CKD, cardiomyopathy, presents EMS to [id number] [address] [facility] ED with cc of syncopal event that just occurred. Pt is reporting neck pain, lower back pain, and right knee pain, as well as several abrasions to hands after passing out onto tile floor at the top of the stairs while at work at [employer]. Pt notes he did not fall down the stairs but he did land on hard dutch. Pt reports attempting to catch himself which caused the multiple abrasions. Pt is unsure how long he was unconscious but notes waking up on the floor. Pt denied chest pain or shortness of breath. No N/V but has had persistent diarrhea. Pt was recently seen/admitted for diarrhea and states that problem has not been alleviated. Pt reports scheduling an appointment with his PCP for ongoing abdominal issues. Pt notes he is not UTD on his tetanus. Patient specifically denies fever, chills, nausea, vomiting, abdominal pain, pelvic pain, shoulder pain, or headache. FSBS by EMS was noted to be 97 (he is insulin dependent)    PCP: Lord Miguelito MD    Social history significant for: + (.5 ppd) Tobacco, + EtOH, - Illicit Drug Use    There are no other complaints, changes, or physical findings at this time. The history is provided by the patient. No  was used. Past Medical History:   Diagnosis Date    Cardiomyopathy [facility]) 05/2012 2012 Echo: severe conc LVH. EF 25-30%. Cath : clean coronaries.  [facility]     Diabetes ([facility] [address]     Hypertension     Hypertensive [facility]/2012    [facility]. 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, l1    Stage 3 chronic kidney disease 7/22/2017    Type I (juvenile type) diabetes mellitus without mention of complication, not stated as uncontrolled        Past Surgical History:   Procedure Laterality Date    HX APPENDECTOMY           Family History:   Problem Relation Age of Onset    Hypertension Mother     Hypertension Sister        Social History     Social History    Marital status:      Spouse name: N/A    Number of children: N/A    Years of education: N/A     Occupational History    Not on file. Social History Main Topics    Smoking status: Former Smoker     Packs/day: 0.50     Quit date: 8/4/2002    Smokeless tobacco: Never Used    Alcohol use No      Comment: occ    Drug use: No    Sexual activity: Not on file     Other Topics Concern    Not on file     Social History Narrative         ALLERGIES: Flexeril [cyclobenzaprine] and Gabapentin    Review of Systems   Constitutional: Negative for chills and fever. HENT: Negative for congestion, rhinorrhea and sore throat. Eyes: Negative for pain and visual disturbance. Respiratory: Negative for cough and shortness of breath. Cardiovascular: Negative for chest pain and palpitations. Gastrointestinal: Positive for diarrhea. Negative for abdominal pain, nausea and vomiting. Endocrine: Negative for polyphagia and polyuria. Genitourinary: Negative for dysuria and hematuria. Musculoskeletal: Positive for arthralgias, back pain and neck pain. Negative for neck stiffness. Skin: Positive for wound. Negative for rash. Neurological: Positive for syncope. Negative for dizziness and headaches. Psychiatric/Behavioral: Negative for agitation and confusion. Vitals:    08/04/17 2021 08/04/17 2035   BP: 151/86 133/90   Pulse: 78 82   Resp: 18 18   Temp: 98.3 °F (36.8 °C)    SpO2: 100% 99%   Weight: 98.4 kg (217 lb)    Height: 5' 9\" (1.753 m)             Physical Exam   Constitutional: He is oriented to person, place, and time. He appears well-developed and well-nourished. No distress. WDWN AA male, alert, in moderate discomfort, fully immobilized with C collar and backboard   HENT:   Head: Normocephalic and atraumatic.    Right Ear: External ear normal.   Left Ear: External ear normal.   Nose: Nose normal.   Mouth/Throat: Oropharynx is clear and moist. No oropharyngeal exudate. Eyes: Conjunctivae and EOM are normal. Right eye exhibits no discharge. Left eye exhibits no discharge. No scleral icterus. Neck: Normal range of motion. Neck supple. No JVD present. No tracheal deviation present. No thyromegaly present. Moderate tenderness to paracervical region, C collar left intact   Cardiovascular: Normal rate, regular rhythm and normal heart sounds. Pulmonary/Chest: Effort normal and breath sounds normal. No respiratory distress. He has no wheezes. He exhibits no tenderness. Abdominal: Soft. There is no tenderness. Musculoskeletal: Normal range of motion. He exhibits no edema. Tender to palpation R knee, 2+ distal pulses, NVI, sensation grossly intact to light touch   Lymphadenopathy:     He has no cervical adenopathy. Neurological: He is alert and oriented to person, place, and time. No cranial nerve deficit. He exhibits normal muscle tone. Coordination normal.   FNF, HTS neg bilat, no pronator drift   Skin: Skin is warm and dry. He is not diaphoretic. No pallor. superficial abrasion to left fourth finger dorsum at PIP and right fourth finger NCP- bleeding controlled  superficial skin tear to lumbar region    Psychiatric: He has a normal mood and affect. His behavior is normal. Judgment normal.   Nursing note and vitals reviewed.        MDM  Number of Diagnoses or Management Options  Acute renal failure, unspecified acute renal failure type [facility]):   Syncope and collapse:   Diagnosis management comments: DDx: dehydration, electrolyte dysfunction, UTI, arrhythmia, colitis, fracture       Amount and/or Complexity of Data Reviewed  Clinical lab tests: reviewed and ordered  Tests in the radiology section of CPT®: ordered and reviewed  Tests in the medicine section of CPT®: ordered and reviewed  Decide to obtain previous medical records or to obtain history from someone other than the patient: yes  Review and summarize past medical records: yes  Discuss the patient with other providers: yes (Hospitalist, nephrologist)  Independent visualization of images, tracings, or specimens: yes    Patient Progress  Patient progress: stable    ED Course       Procedures     Progress Note:  7:05 PM  Reviewed pt's chart. No recent EKG's on file. Was seen two weeks ago and EF was 55-60%. Written by Sudhir Doe, ED Scribe, as dictated by Conrado Frazier PA-C. SIGN OUT:  7:15 PM  Patient's presentation, labs/imaging and plan of care was reviewed with Rhina Trujillo PA-C as part of sign out. They will treat and dispo as part of the plan discussed with the patient. Esthela Frazier PA-C's assistance in completion of this plan is greatly appreciated but it should be noted that I will be the provider of record for this patient. Esthela Cleaning      Progress Note:  8:09 PM  Updated patient at bedside on plan of care. Recommended admission. Patient expresses understanding and agrees with plan. Addressed questions. Written by Burton Murillo ED Scribe, as dictated by Radha Baker MD.     Procedure Note - C-collar removed:   8:12 PM  Performed by: Radha Baker MD  C-spine cleared using NEXUS criteria. C-collar removed. Written by Burton Murillo ED Scribe, as dictated by Radha Baker MD.     Consult Note:  8:19 PM  Radha Baker MD spoke with Amanda Santos MD,  Specialty: hospitalist  Discussed pt's hx, disposition, and available diagnostic and imaging results. Reviewed care plans. Consultant agrees with plans as outlined. Dr. Deneen Ortega accepts the patient for admission. Written by Burton Murillo ED Scribe, as dictated by Radha Baker MD.    Consult Note:  8:27 PM  Radha Baker MD spoke with Dr. Radhika Smalls,  Specialty: nephrology   Discussed pt's hx, disposition, and available diagnostic and imaging results. Reviewed care plans. Consultant agrees with plans as outlined.  Dr. Samuel Ramirez accepts the patient in consultation. Written by CHINA Treviño, as dictated by Magaly Garcia MD.     LABORATORY TESTS:  Recent Results (from the past 12 hour(s))   EKG, 12 LEAD, INITIAL    Collection Time: 08/04/17  6:59 PM   Result Value Ref Range    Ventricular Rate 76 BPM    Atrial Rate 76 BPM    P-R Interval 146 ms    QRS Duration 80 ms    Q-T Interval 416 ms    QTC Calculation (Bezet) 468 ms    Calculated P Axis 75 degrees    Calculated R Axis 8 degrees    Calculated T Axis 39 degrees    Diagnosis       Normal sinus rhythm  Possible Left atrial enlargement  Nonspecific T wave abnormality  Prolonged QT  Abnormal ECG  When compared with ECG of 13-JUN-2003 05:53,  Previous ECG has undetermined rhythm, needs review     CBC WITH AUTOMATED DIFF    Collection Time: 08/04/17  7:07 PM   Result Value Ref Range    WBC 7.4 4.1 - 11.1 K/uL    RBC 3.90 (L) 4.10 - 5.70 M/uL    HGB 10.2 (L) 12.1 - 17.0 g/dL    HCT 31.0 (L) 36.6 - 50.3 %    MCV 79.5 (L) 80.0 - 99.0 FL    MCH 26.2 26.0 - 34.0 PG    MCHC 32.9 30.0 - 36.5 g/dL    RDW 16.1 (H) 11.5 - 14.5 %    PLATELET 097 234 - 719 K/uL    NEUTROPHILS 57 %    LYMPHOCYTES 31 %    MONOCYTES 6 %    EOSINOPHILS 5 %    BASOPHILS 1 %    ABS. NEUTROPHILS 4.2 K/UL    ABS. LYMPHOCYTES 2.3 K/UL    ABS. MONOCYTES 0.4 K/UL    ABS. EOSINOPHILS 0.4 K/UL    ABS.  BASOPHILS 0.1 K/UL    RBC COMMENTS NORMOCYTIC, NORMOCHROMIC      WBC COMMENTS FEW     METABOLIC PANEL, COMPREHENSIVE    Collection Time: 08/04/17  7:07 PM   Result Value Ref Range    Sodium 138 136 - 145 mmol/L    Potassium 4.3 3.5 - 5.1 mmol/L    Chloride 106 97 - 108 mmol/L    CO2 25 21 - 32 mmol/L    Anion gap 7 5 - 15 mmol/L    Glucose 82 65 - 100 mg/dL    BUN 31 (H) 6 - 20 MG/DL    Creatinine 3.00 (H) 0.70 - 1.30 MG/DL    BUN/Creatinine ratio 10 (L) 12 - 20      GFR est AA 27 (L) >60 ml/min/1.73m2    GFR est non-AA 22 (L) >60 ml/min/1.73m2    Calcium 8.7 8.5 - 10.1 MG/DL    Bilirubin, total 0.6 0.2 - 1.0 MG/DL    ALT (SGPT) 142 (H) 12 - 78 U/L    AST (SGOT) 60 (H) 15 - 37 U/L    Alk. phosphatase 121 (H) 45 - 117 U/L    Protein, total 7.8 6.4 - 8.2 g/dL    Albumin 3.0 (L) 3.5 - 5.0 g/dL    Globulin 4.8 (H) 2.0 - 4.0 g/dL    A-G Ratio 0.6 (L) 1.1 - 2.2     LIPASE    Collection Time: 08/04/17  7:07 PM   Result Value Ref Range    Lipase 56 (L) 73 - 393 U/L   CK W/ REFLX CKMB    Collection Time: 08/04/17  7:07 PM   Result Value Ref Range     39 - 308 U/L   TROPONIN I    Collection Time: 08/04/17  7:07 PM   Result Value Ref Range    Troponin-I, Qt. <0.04 <0.05 ng/mL   NT-PRO BNP    Collection Time: 08/04/17  7:07 PM   Result Value Ref Range    NT pro-BNP 1186 (H) 0 - 125 PG/ML   GLUCOSE, POC    Collection Time: 08/04/17  7:09 PM   Result Value Ref Range    Glucose (POC) 247 (H) 65 - 100 mg/dL    Performed by Anderson Clever Machine        IMAGING RESULTS:  XR KNEE RT 3 V   Final Result   EXAM:  XR KNEE RT 3 V     INDICATION:   knee pain, s/p fall. Additional history: Patient fell at work after a syncopal episode  COMPARISON: None.     FINDINGS: Three views of the right knee demonstrate no fracture or other acute  osseous or articular abnormality. There is no effusion.     IMPRESSION  IMPRESSION:    1. No visible fracture. XR SPINE LUMB 2 OR 3 V   Final Result   EXAM:  XR SPINE LUMB 2 OR 3 V  INDICATION:   low back pain, s/p syncope/fall  COMPARISON: None. Betha Lute FINDINGS:   AP, lateral and spot lateral views of the lumbar spine demonstrate normal  alignment. Vertebral body heights are well-maintained. Degenerative changes  throughout the lumbar spine, nonfocal.  No visible fracture or subluxation. BB  overlying the right buttock. .  IMPRESSION  IMPRESSION:    1. No visible fracture or subluxation. 2. Nonfocal degenerative changes throughout the spine. CT SPINE CERV WO CONT   Final Result   INDICATION:   neck pain, s/p syncope/fall  COMPARISON: None. .  TECHNIQUE:     Noncontrast axial CT imaging of the cervical spine was performed.   Coronal and  sagittal reconstructions were obtained. CT dose reduction was achieved through use of a standardized protocol tailored  for this examination and automatic exposure control for dose modulation. Estanislado Lobstein FINDINGS:  Disc osteophyte degenerative changes from C4-C6. The alignment is normal. There  is no visualized fracture or subluxation. Incidental imaging of the paraspinal soft tissues is unremarkable. The  visualized lung apices are unremarkable. Estanislado Lobstein IMPRESSION  IMPRESSION:  1. No visualized fracture or subluxation. CT HEAD WO CONT   Final Result   EXAM:  CT HEAD WO CONT  INDICATION:   syncope, unknown head injury  COMPARISON: CT neck, 7/22/2017. Estanislado Lobstein TECHNIQUE:   Unenhanced CT of the head was performed using 5 mm images. Coronal and sagittal  reformats were produced. Brain and bone windows were generated. CT dose reduction was achieved through use of a standardized protocol tailored  for this examination and automatic exposure control for dose modulation. Estanislado Lobstein FINDINGS:  The ventricles and sulci are normal in size, shape and configuration and  midline. There is no significant white matter disease. There is no intracranial  hemorrhage, extra-axial collection, mass, mass effect or midline shift. The  basilar cisterns are open. No acute infarct is identified. The bone windows  demonstrate no abnormalities. The visualized portions of the paranasal sinuses  and mastoid air cells are clear. Estanislado Lobstein IMPRESSION  IMPRESSION:   1. No evidence of acute intracranial abnormality by this modality.        MEDICATIONS GIVEN:  Medications   sodium chloride (NS) flush 5-10 mL (not administered)   sodium chloride (NS) flush 5-10 mL (not administered)   sodium chloride 0.9 % bolus infusion 1,000 mL (not administered)   neomycin-bacitracnZn-polymyxnB (NEOSPORIN) ointment 1 Packet (not administered)   diph,Pertuss(AC),Tet Vac-PF (BOOSTRIX) suspension 0.5 mL (not administered)   sodium chloride 0.9 % bolus infusion 1,000 mL (1,000 mL IntraVENous New Bag 8/4/17 2040)       IMPRESSION:  1. Acute renal failure, unspecified acute renal failure type ([facility] [address]    2. Syncope and collapse        PLAN:  1. Admit to Dr. Anais Vicente hospitalist.    Admit Note:  8:19 PM  Pt is being admitted by Dr. Anais Vicente, hospitalist. The results of their tests and reason(s) for their admission have been discussed with pt and/or available family. They convey agreement and understanding for the need to be admitted and for admission diagnosis. This note is prepared by Moustapha Sue, acting as a Scribe for Darryn Salazar MD.    Darryn Salazar MD: The scribe's documentation has been prepared under my direction and personally reviewed by me in its entirety. I confirm that the notes above accurately reflects all work, treatment, procedures, and medical decision making performed by me.

## 2017-08-05 LAB
ALBUMIN SERPL BCP-MCNC: 2.6 G/DL (ref 3.5–5)
ALBUMIN/GLOB SERPL: 0.6 {RATIO} (ref 1.1–2.2)
ALP SERPL-CCNC: 107 U/L (ref 45–117)
ALT SERPL-CCNC: 127 U/L (ref 12–78)
AMORPH CRY URNS QL MICRO: ABNORMAL
ANION GAP BLD CALC-SCNC: 6 MMOL/L (ref 5–15)
APPEARANCE UR: ABNORMAL
AST SERPL W P-5'-P-CCNC: 49 U/L (ref 15–37)
ATRIAL RATE: 76 BPM
BACTERIA URNS QL MICRO: NEGATIVE /HPF
BASOPHILS # BLD AUTO: 0 K/UL
BASOPHILS # BLD: 0 %
BILIRUB SERPL-MCNC: 0.5 MG/DL (ref 0.2–1)
BILIRUB UR QL: NEGATIVE
BUN SERPL-MCNC: 29 MG/DL (ref 6–20)
BUN/CREAT SERPL: 11 (ref 12–20)
CALCIUM SERPL-MCNC: 7.9 MG/DL (ref 8.5–10.1)
CALCULATED P AXIS, ECG09: 75 DEGREES
CALCULATED R AXIS, ECG10: 8 DEGREES
CALCULATED T AXIS, ECG11: 39 DEGREES
CHLORIDE SERPL-SCNC: 111 MMOL/L (ref 97–108)
CK MB CFR SERPL CALC: 2.1 % (ref 0–2.5)
CK MB SERPL-MCNC: 3.6 NG/ML (ref 5–25)
CK SERPL-CCNC: 174 U/L (ref 39–308)
CO2 SERPL-SCNC: 23 MMOL/L (ref 21–32)
COLOR UR: ABNORMAL
CREAT SERPL-MCNC: 2.59 MG/DL (ref 0.7–1.3)
CRYPTOSP AG STL QL: NEGATIVE
DIAGNOSIS, 93000: NORMAL
DIFFERENTIAL METHOD BLD: ABNORMAL
EOSINOPHIL # BLD: 0.4 K/UL
EOSINOPHIL NFR BLD: 5 %
EPITH CASTS URNS QL MICRO: ABNORMAL /LPF
ERYTHROCYTE [DISTWIDTH] IN BLOOD BY AUTOMATED COUNT: 16.2 % (ref 11.5–14.5)
FERRITIN SERPL-MCNC: 183 NG/ML (ref 26–388)
G LAMBLIA AG STL QL: NEGATIVE
GLOBULIN SER CALC-MCNC: 4.2 G/DL (ref 2–4)
GLUCOSE BLD STRIP.AUTO-MCNC: 100 MG/DL (ref 65–100)
GLUCOSE BLD STRIP.AUTO-MCNC: 102 MG/DL (ref 65–100)
GLUCOSE BLD STRIP.AUTO-MCNC: 105 MG/DL (ref 65–100)
GLUCOSE BLD STRIP.AUTO-MCNC: 93 MG/DL (ref 65–100)
GLUCOSE SERPL-MCNC: 103 MG/DL (ref 65–100)
GLUCOSE UR STRIP.AUTO-MCNC: NEGATIVE MG/DL
HCT VFR BLD AUTO: 28.5 % (ref 36.6–50.3)
HGB BLD-MCNC: 9.2 G/DL (ref 12.1–17)
HGB UR QL STRIP: ABNORMAL
IRON SATN MFR SERPL: 19 % (ref 20–50)
IRON SERPL-MCNC: 35 UG/DL (ref 35–150)
KETONES UR QL STRIP.AUTO: NEGATIVE MG/DL
LEUKOCYTE ESTERASE UR QL STRIP.AUTO: NEGATIVE
LYMPHOCYTES # BLD AUTO: 31 %
LYMPHOCYTES # BLD: 2.5 K/UL
MCH RBC QN AUTO: 25.8 PG (ref 26–34)
MCHC RBC AUTO-ENTMCNC: 32.3 G/DL (ref 30–36.5)
MCV RBC AUTO: 79.8 FL (ref 80–99)
MONOCYTES # BLD: 0.9 K/UL
MONOCYTES NFR BLD AUTO: 11 %
NEUTS SEG # BLD: 4.2 K/UL
NEUTS SEG NFR BLD AUTO: 53 %
NITRITE UR QL STRIP.AUTO: NEGATIVE
P-R INTERVAL, ECG05: 146 MS
PH UR STRIP: 5 [PH] (ref 5–8)
PLATELET # BLD AUTO: 293 K/UL (ref 150–400)
POTASSIUM SERPL-SCNC: 4 MMOL/L (ref 3.5–5.1)
PROT SERPL-MCNC: 6.8 G/DL (ref 6.4–8.2)
PROT UR STRIP-MCNC: 100 MG/DL
Q-T INTERVAL, ECG07: 416 MS
QRS DURATION, ECG06: 80 MS
QTC CALCULATION (BEZET), ECG08: 468 MS
RBC # BLD AUTO: 3.57 M/UL (ref 4.1–5.7)
RBC #/AREA URNS HPF: ABNORMAL /HPF (ref 0–5)
RBC MORPH BLD: ABNORMAL
SERVICE CMNT-IMP: ABNORMAL
SERVICE CMNT-IMP: ABNORMAL
SERVICE CMNT-IMP: NORMAL
SERVICE CMNT-IMP: NORMAL
SODIUM SERPL-SCNC: 140 MMOL/L (ref 136–145)
SP GR UR REFRACTOMETRY: 1.01 (ref 1–1.03)
TIBC SERPL-MCNC: 189 UG/DL (ref 250–450)
TROPONIN I SERPL-MCNC: <0.04 NG/ML
UA: UC IF INDICATED,UAUC: ABNORMAL
UROBILINOGEN UR QL STRIP.AUTO: 0.2 EU/DL (ref 0.2–1)
VENTRICULAR RATE, ECG03: 76 BPM
WBC # BLD AUTO: 8 K/UL (ref 4.1–11.1)
WBC MORPH BLD: ABNORMAL
WBC URNS QL MICRO: ABNORMAL /HPF (ref 0–4)

## 2017-08-05 PROCEDURE — 82962 GLUCOSE BLOOD TEST: CPT

## 2017-08-05 PROCEDURE — 36415 COLL VENOUS BLD VENIPUNCTURE: CPT | Performed by: INTERNAL MEDICINE

## 2017-08-05 PROCEDURE — 87177 OVA AND PARASITES SMEARS: CPT | Performed by: INTERNAL MEDICINE

## 2017-08-05 PROCEDURE — 81001 URINALYSIS AUTO W/SCOPE: CPT | Performed by: PHYSICIAN ASSISTANT

## 2017-08-05 PROCEDURE — 83540 ASSAY OF IRON: CPT | Performed by: INTERNAL MEDICINE

## 2017-08-05 PROCEDURE — 74011250637 HC RX REV CODE- 250/637: Performed by: INTERNAL MEDICINE

## 2017-08-05 PROCEDURE — 84484 ASSAY OF TROPONIN QUANT: CPT | Performed by: INTERNAL MEDICINE

## 2017-08-05 PROCEDURE — 82728 ASSAY OF FERRITIN: CPT | Performed by: INTERNAL MEDICINE

## 2017-08-05 PROCEDURE — 74011636637 HC RX REV CODE- 636/637: Performed by: INTERNAL MEDICINE

## 2017-08-05 PROCEDURE — 74011250636 HC RX REV CODE- 250/636: Performed by: INTERNAL MEDICINE

## 2017-08-05 PROCEDURE — 85025 COMPLETE CBC W/AUTO DIFF WBC: CPT | Performed by: INTERNAL MEDICINE

## 2017-08-05 PROCEDURE — 80053 COMPREHEN METABOLIC PANEL: CPT | Performed by: INTERNAL MEDICINE

## 2017-08-05 PROCEDURE — 82550 ASSAY OF CK (CPK): CPT | Performed by: INTERNAL MEDICINE

## 2017-08-05 PROCEDURE — 82705 FATS/LIPIDS FECES QUAL: CPT | Performed by: INTERNAL MEDICINE

## 2017-08-05 PROCEDURE — 87329 GIARDIA AG IA: CPT | Performed by: INTERNAL MEDICINE

## 2017-08-05 PROCEDURE — 65660000000 HC RM CCU STEPDOWN

## 2017-08-05 RX ORDER — INSULIN GLARGINE 100 [IU]/ML
10 INJECTION, SOLUTION SUBCUTANEOUS
Status: DISCONTINUED | OUTPATIENT
Start: 2017-08-05 | End: 2017-08-10 | Stop reason: HOSPADM

## 2017-08-05 RX ORDER — ASPIRIN 81 MG/1
81 TABLET ORAL DAILY
Status: DISCONTINUED | OUTPATIENT
Start: 2017-08-05 | End: 2017-08-10 | Stop reason: HOSPADM

## 2017-08-05 RX ORDER — DEXTROSE 50 % IN WATER (D50W) INTRAVENOUS SYRINGE
12.5-25 AS NEEDED
Status: DISCONTINUED | OUTPATIENT
Start: 2017-08-05 | End: 2017-08-05

## 2017-08-05 RX ORDER — INSULIN LISPRO 100 [IU]/ML
INJECTION, SOLUTION INTRAVENOUS; SUBCUTANEOUS
Status: DISCONTINUED | OUTPATIENT
Start: 2017-08-05 | End: 2017-08-10 | Stop reason: HOSPADM

## 2017-08-05 RX ORDER — CARVEDILOL 12.5 MG/1
25 TABLET ORAL 2 TIMES DAILY WITH MEALS
Status: DISCONTINUED | OUTPATIENT
Start: 2017-08-05 | End: 2017-08-08

## 2017-08-05 RX ORDER — SODIUM CHLORIDE 9 MG/ML
125 INJECTION, SOLUTION INTRAVENOUS CONTINUOUS
Status: DISCONTINUED | OUTPATIENT
Start: 2017-08-05 | End: 2017-08-10

## 2017-08-05 RX ORDER — DOXAZOSIN 2 MG/1
4 TABLET ORAL DAILY
Status: DISCONTINUED | OUTPATIENT
Start: 2017-08-05 | End: 2017-08-08

## 2017-08-05 RX ORDER — INSULIN GLARGINE 100 [IU]/ML
12 INJECTION, SOLUTION SUBCUTANEOUS
Status: DISCONTINUED | OUTPATIENT
Start: 2017-08-05 | End: 2017-08-05

## 2017-08-05 RX ORDER — HYDRALAZINE HYDROCHLORIDE 20 MG/ML
20 INJECTION INTRAMUSCULAR; INTRAVENOUS
Status: DISCONTINUED | OUTPATIENT
Start: 2017-08-05 | End: 2017-08-07

## 2017-08-05 RX ORDER — MAGNESIUM SULFATE 100 %
4 CRYSTALS MISCELLANEOUS AS NEEDED
Status: DISCONTINUED | OUTPATIENT
Start: 2017-08-05 | End: 2017-08-10 | Stop reason: HOSPADM

## 2017-08-05 RX ORDER — AMLODIPINE BESYLATE 5 MG/1
5 TABLET ORAL DAILY
Status: DISCONTINUED | OUTPATIENT
Start: 2017-08-05 | End: 2017-08-08

## 2017-08-05 RX ADMIN — DOXAZOSIN 4 MG: 2 TABLET ORAL at 09:13

## 2017-08-05 RX ADMIN — CARVEDILOL 25 MG: 12.5 TABLET, FILM COATED ORAL at 09:13

## 2017-08-05 RX ADMIN — ENOXAPARIN SODIUM 30 MG: 30 INJECTION SUBCUTANEOUS at 09:16

## 2017-08-05 RX ADMIN — AMLODIPINE BESYLATE 5 MG: 5 TABLET ORAL at 09:13

## 2017-08-05 RX ADMIN — Medication 10 ML: at 06:34

## 2017-08-05 RX ADMIN — CARVEDILOL 25 MG: 12.5 TABLET, FILM COATED ORAL at 17:30

## 2017-08-05 RX ADMIN — INSULIN GLARGINE 10 UNITS: 100 INJECTION, SOLUTION SUBCUTANEOUS at 22:14

## 2017-08-05 RX ADMIN — ASPIRIN 81 MG: 81 TABLET, COATED ORAL at 09:13

## 2017-08-05 RX ADMIN — SODIUM CHLORIDE 75 ML/HR: 900 INJECTION, SOLUTION INTRAVENOUS at 22:14

## 2017-08-05 RX ADMIN — SODIUM CHLORIDE 50 ML/HR: 900 INJECTION, SOLUTION INTRAVENOUS at 01:00

## 2017-08-05 RX ADMIN — Medication 10 ML: at 22:15

## 2017-08-05 RX ADMIN — Medication 10 ML: at 15:16

## 2017-08-05 RX ADMIN — INSULIN GLARGINE 10 UNITS: 100 INJECTION, SOLUTION SUBCUTANEOUS at 02:35

## 2017-08-05 NOTE — ED NOTES
TRANSFER - OUT REPORT:    Verbal report given to Deana Long RN(name) on Jasen Valerio  being transferred to [facility](unit) for routine progression of care       Report consisted of patients Situation, Background, Assessment and   Recommendations(SBAR). Information from the following report(s) SBAR, Kardex, ED Summary, [facility] and Recent Results was reviewed with the receiving nurse. Lines:   Peripheral IV 07/21/17 Right Antecubital (Active)       Peripheral IV 07/21/17 Left Forearm (Active)       Peripheral IV 08/04/17 Left Antecubital (Active)   Site Assessment Clean, dry, & intact 8/4/2017  7:12 PM   Phlebitis Assessment 0 8/4/2017  7:12 PM   Infiltration Assessment 0 8/4/2017  7:12 PM   Dressing Status Clean, dry, & intact 8/4/2017  7:12 PM   Dressing Type Transparent 8/4/2017  7:12 PM   Hub Color/Line Status Flushed 8/4/2017  7:12 PM   Action Taken Blood drawn 8/4/2017  7:12 PM        Opportunity for questions and clarification was provided.

## 2017-08-05 NOTE — CONSULTS
Assessment:  ELMER on CKD-3: Showing improvement with IVF. 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 HTN  DM2  Diarrhea: recent w/u neg     Plan/Recommendations:  Ct IVF  Send off UA  Ct to hold Lisinopril  Diarrhea w/u per hospitalist team  Hold parameters on BP meds  Strict I/Os,avoid nephrotoxins, renally adjust new meds  Am labs      Discussed with patient    Thanks for the consultation. Renal service will follow patient with you. Please contact me with any questions or concerns. Initial Consult note         Patient name: Corinne Barbone  MR no: [id number]  Date of admission: 8/4/2017  Date of consultation: 8/5/2017  Requested by: Dr. Trino Fan  Reason for consult: ELMER on CKD    Patient seen and examined. History obtained from patient and chart review. Relevant labs, data and notes reviewed. HPI: Corinne Barbone is a 46 y.o. male with PMH significant for CKD stage 3, DM2, HTN, Cardiomyopathy (PReviously %->last echo showed improved EF 55-60%)-> recently admitted from 7/21-7/25/17 at [facility] with syncopal episode-> n/v/d. Seen during that hospitalization for ELMER on CKD-> peak 4.6mg/dl-> improved with IVF to discharge level of 1.7mg/dl. 2-3 days post discharge patient reports having diarrhea again-> profuse-> 10times/day. No blood noted. +LH/Dz. +Syncopal episode at work. Denies any change in urine output. Drinking \" a lot of water\". NO n/v. Denies any NSAID use. Admission serum Cr back up to 3.0mg/dl on admission. IV NS x 2liters given. Nephrology consulted to evaluate ELMER on CKD-3. PMH:  Past Medical History:   Diagnosis Date    Cardiomyopathy [facility]) 05/2012 2012 Echo: severe conc LVH. EF 25-30%. Cath : clean coronaries.  [facility]     Diabetes ([facility] [address]     Hypertension     Hypertensive emergency 05/2012    [facility] 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, l1    Stage 3 chronic kidney disease 7/22/2017    Type I (juvenile type) diabetes mellitus without mention of complication, not stated as uncontrolled      PSH:  Past Surgical History:   Procedure Laterality Date    HX APPENDECTOMY         Social history:   Social History   Substance Use Topics    Smoking status: Former Smoker     Packs/day: 0.50     Quit date: 8/4/2002    Smokeless tobacco: Never Used    Alcohol use No      Comment: occ       Family history:  No history of CKD or ESRD in the family.      Allergies   Allergen Reactions    Flexeril [Cyclobenzaprine] Nausea Only     Patient states he is not allergic    Gabapentin Unknown (comments)     Patient states he is not allergic       Current Facility-Administered Medications   Medication Dose Route Frequency Last Dose    insulin lispro (HUMALOG) injection   SubCUTAneous AC&HS Stopped at 08/05/17 0730    glucose chewable tablet 16 g  4 Tab Oral PRN      glucagon (GLUCAGEN) injection 1 mg  1 mg IntraMUSCular PRN      0.9% sodium chloride infusion  50 mL/hr IntraVENous CONTINUOUS 50 mL/hr at 08/05/17 0100    amLODIPine (NORVASC) tablet 5 mg  5 mg Oral DAILY 5 mg at 08/05/17 0913    carvedilol (COREG) tablet 25 mg  25 mg Oral BID WITH MEALS 25 mg at 08/05/17 0913    doxazosin (CARDURA) tablet 4 mg  4 mg Oral DAILY 4 mg at 08/05/17 0913    aspirin delayed-release tablet 81 mg  81 mg Oral DAILY 81 mg at 08/05/17 0913    insulin glargine (LANTUS) injection 10 Units  10 Units SubCUTAneous QHS 10 Units at 08/05/17 0235    hydrALAZINE (APRESOLINE) 20 mg/mL injection 20 mg  20 mg IntraVENous Q6H PRN      dextrose 10 % infusion 125-250 mL  125-250 mL IntraVENous PRN      sodium chloride (NS) flush 5-10 mL  5-10 mL IntraVENous Q8H 10 mL at 08/05/17 0634    sodium chloride (NS) flush 5-10 mL  5-10 mL IntraVENous PRN      sodium chloride (NS) flush 5-10 mL  5-10 mL IntraVENous Q8H 10 mL at 08/05/17 8273    sodium chloride (NS) flush 5-10 mL  5-10 mL IntraVENous PRN      acetaminophen (TYLENOL) tablet 650 mg  650 mg Oral Q6H PRN      oxyCODONE-acetaminophen (PERCOCET) 5-325 mg per tablet 1 Tab  1 Tab Oral Q6H PRN      naloxone (NARCAN) injection 0.4 mg  0.4 mg IntraVENous PRN      ondansetron (ZOFRAN) injection 4 mg  4 mg IntraVENous Q4H PRN      bisacodyl (DULCOLAX) suppository 10 mg  10 mg Rectal DAILY PRN      enoxaparin (LOVENOX) injection 30 mg  30 mg SubCUTAneous Q24H 30 mg at 08/05/17 0916       ROS (besides HPI):    General: No fever. No weight changes  ENT: No hearing loss or visual changes  Cardiovascular: No Chest pain  Pulmonary: No SOB  GI: No abdominal pain. No Nausea/Vomiting. +Diarrhea. No blood in stool  : No blood in urine. No foamy or cloudy urine  Musculoskeletal: No joint swelling or redness. No morning stiffness  Endocrine: no cold or heat intolerance  Psych: denies anxiety or depression  Neuro: + light headedness/dizziness    Objective   Visit Vitals    BP 96/52    Pulse 70    Temp 97.8 °F (36.6 °C)    Resp 18    Ht 5' 9\" (1.753 m)    Wt 98.4 kg (217 lb)    SpO2 100%    BMI 32.05 kg/m2       Physical Exam:    Gen: NAD    HEENT: AT/NC, EOMI, moist mucous membrane, no scleral icterus    Neck: no JVD, no cervical lymphadenopathy, no carotid bruit    Lungs/Chest wall: Breath sounds normal. Symmetrical chest wall expansion. No accessory muscle use. Clear to auscultation    Cardiovascular: Normal S1/S2, normal rate, regular rhythm. Abdomen: soft, NT, ND, BS+, no HSM    Ext: no clubbing or cyanosis. No edema    Skin: warm and dry. No rashes    : no CVA tenderness    CNS: alert awake. Answers appropriately.      Labs/Data:    Lab Results   Component Value Date/Time    Sodium 140 08/05/2017 01:24 AM    Potassium 4.0 08/05/2017 01:24 AM    Chloride 111 08/05/2017 01:24 AM    CO2 23 08/05/2017 01:24 AM    Anion gap 6 08/05/2017 01:24 AM    Glucose 103 08/05/2017 01:24 AM    BUN 29 08/05/2017 01:24 AM    Creatinine 2.59 08/05/2017 01:24 AM    BUN/Creatinine ratio 11 08/05/2017 01:24 AM    GFR est AA 32 08/05/2017 01:24 AM    GFR est non-AA 26 08/05/2017 01:24 AM    Calcium 7.9 08/05/2017 01:24 AM       Lab Results   Component Value Date/Time    WBC 8.0 08/05/2017 01:24 AM    Hemoglobin (POC) 10.5 07/21/2017 09:27 PM    HGB 9.2 08/05/2017 01:24 AM    Hematocrit (POC) 31 07/21/2017 09:27 PM    HCT 28.5 08/05/2017 01:24 AM    PLATELET 283 38/39/7599 01:24 AM    MCV 79.8 08/05/2017 01:24 AM       Urine analysis: not done this admission        No components found for: SPEP, UPEP  No results found for: PUQ, PROTU2, PROTU1, BJP1, CPE1, IMEL1, MET2  Lab Results   Component Value Date/Time    Microalb/Creat ratio (ug/mg creat.) 607.3 10/14/2015 01:46 PM       No intake or output data in the 24 hours ending 08/05/17 1429    Wt Readings from Last 3 Encounters:   08/04/17 98.4 kg (217 lb)   07/25/17 101.5 kg (223 lb 11.2 oz)   07/21/17 97.9 kg (215 lb 14.4 oz)       Signed by:  Stephanie Singh MD  Nephrology and Hypertension  [facility]

## 2017-08-05 NOTE — PROGRESS NOTES
Hospitalist Progress Note    NAME: Michael Wilkinson   :  1965   MRN:  [id number]       Assessment / Plan:  Syncope POA   Acute on chronic renal failure POA with baseline CKD3  Recurrent due to dehydration with Persistent/Subacute diarrhea  Recent 2D echo with normal EF  Monitor orthostatics  Increase IVF to 75ml/hr  Try lactulose free diet to see if it help  May need questran vs immodium depending on workup  Recent infectious workup negative at [facility] 121 input  Monitor lytes  Nephrology also involved  Recent Renal ultrasound normal when admitted with similar presentation  Check fecal fat, ova and oparasite     Type 2 diabetes mellitus   Continue reduced Lantus from 20 to 10 unit for now  SSI     ? Prior Cardiomyopathy per H&P  Pt denies any history, I am not able to find records  Most recent left ventricular ejection fraction was 55% to 60%   Holding ACE inh     HTN  Holding ACE inh  Continue Norvasc, Cardura and Coreg  p.r.n. Hydralazine    Right knee pain, status post fall. X-ray is negative.   p.r.n. Tylenol and percocet     Body mass index is 32.05 kg/(m^2). Code status: Full  Prophylaxis: Lovenox  Recommended Disposition: Home w/Family     Subjective: Pt seen and examined at bedside. Continue to have diarrhea. Overnight events d/w RN     Chief Complaint / Reason for Physician Visit: f/u \"syncope and persistent diarrhea\"    Review of Systems:  Symptom Y/N Comments  Symptom Y/N Comments   Fever/Chills n   Chest Pain n    Poor Appetite    Edema     Cough n   Abdominal Pain n    Sputum    Joint Pain     SOB/ANGULO    Pruritis/Rash     Nausea/vomit n   Tolerating PT/OT     Diarrhea y   Tolerating Diet y    Constipation    Other       Could NOT obtain due to:      Objective:     VITALS:   Last 24hrs VS reviewed since prior progress note.  Most recent are:  Patient Vitals for the past 24 hrs:   Temp Pulse Resp BP SpO2   17 1514 98 °F (36.7 °C) 77 18 133/69 100 %   17 1204 97.8 °F (36.6 °C) 70 18 96/52 100 %   08/05/17 0718 97.8 °F (36.6 °C) 64 18 130/81 100 %   08/05/17 0407 98 °F (36.7 °C) 67 18 139/73 99 %   08/04/17 2309 98.3 °F (36.8 °C) 79 20 133/72 -   08/04/17 2230 - 81 18 132/77 97 %   08/04/17 2200 - 78 15 131/82 100 %   08/04/17 2100 - 79 14 115/85 99 %   08/04/17 2035 - 82 18 133/90 99 %   08/04/17 2021 98.3 °F (36.8 °C) 78 18 151/86 100 %     No intake or output data in the 24 hours ending 08/05/17 1700     PHYSICAL EXAM:  General: WD, WN. Alert, cooperative, no acute distress    EENT:  EOMI. Anicteric sclerae. MMM  Resp:  CTA bilaterally, no wheezing or rales. No accessory muscle use  CV:  Regular  rhythm,  No edema  GI:  Soft, Non distended, Non tender.  +Bowel sounds  Neurologic:  Alert and oriented X 3, normal speech,   Psych:   Good insight. Not anxious nor agitated  Skin:  No rashes. No jaundice    Reviewed most current lab test results and cultures  YES  Reviewed most current radiology test results   YES  Review and summation of old records today    NO  Reviewed patient's current orders and MAR    YES  PMH/ reviewed - no change compared to H&P  ________________________________________________________________________  Care Plan discussed with:    Comments   Patient y    Family  y bedside   RN y    Care Manager     Consultant                        Multidiciplinary team rounds were held today with , nursing, pharmacist and clinical coordinator. Patient's plan of care was discussed; medications were reviewed and discharge planning was addressed.      ________________________________________________________________________  Total NON critical care TIME:  35 Minutes    Total CRITICAL CARE TIME Spent:   Minutes non procedure based      Comments   >50% of visit spent in counseling and coordination of care     ________________________________________________________________________  Karla Harden, MD     Procedures: see electronic medical records for all procedures/Xrays and details which were not copied into this note but were reviewed prior to creation of Plan. LABS:  I reviewed today's most current labs and imaging studies.   Pertinent labs include:  Recent Labs      08/05/17   0124 08/04/17 1907   WBC  8.0  7.4   HGB  9.2*  10.2*   HCT  28.5*  31.0*   PLT  293  320     Recent Labs      08/05/17 0124 08/04/17 1907   NA  140  138   K  4.0  4.3   CL  111*  106   CO2  23  25   GLU  103*  82   BUN  29*  31*   CREA  2.59*  3.00*   CA  7.9*  8.7   ALB  2.6*  3.0*   TBILI  0.5  0.6   SGOT  49*  60*   ALT  127*  142*       Signed: Sweta Meadows MD

## 2017-08-05 NOTE — H&P
[place], [address]   HISTORY AND PHYSICAL       Name:  Esteban Hull   MR#:  [id number]   :  1965   Account #:  [de-identified]        Date of Adm:  2017       PRIMARY CARE PHYSICIAN: Mayra Celaya MD     ASSESSMENT AND PLAN   1. Syncope, present on admission. Passed out at work suddenly. Now   seems back to normal. Possibly related to hypovolemia with recurrent   diarrhea and new acute kidney injury. Recent echo 2 weeks ago at [facility] had left ventricular ejection fraction of 55% to 60%. IV fluids. Telemetry overnight. Repeat cardiac enzymes in the morning. Check   orthostatics. 2. Acute kidney injury, present on admission. Admit creatinine 3.00. Prior baseline creatinine approximately 1.7. Second admission in the   past several weeks for acute kidney injury, just admitted at [address] [place] [address]   2017. Will plan to hold lisinopril. Renal ultrasound that admission   was negative. Gentle IV fluids overnight. Will consult Nephrology in the   morning. Serial laboratories. 3. Subacute diarrhea times weeks, present on admission. Etiology   unclear. Has been coming and going, watery when it occurs, foul   smelling, brown, without blood or melena. Not clearly   associated with food intake. No abdominal pain, weight loss, or   vomiting. Recent C. difficile, stool WBC, and stool culture were all   normal. Will check for Giardia and Cryptosporidium. Will ask GI to see   in the morning. Did have a colonoscopy several years ago, per his   report, that was negative. 4. Type 2 diabetes mellitus on insulin, present on admission. Last   hemoglobin A1c was 6.5. Diabetic diet. Reduce Lantus from 20 to 10   units. Use sliding scale insulin. 5. Prior systolic congestive heart failure, present on admission, . Most recent left ventricular ejection fraction was 55% to 60% two   weeks ago.  Continue blood pressure medicine, except hold lisinopril   with the kidney failure. 6. Essential hypertension, present on admission. Continue Norvasc,   doxazosin, and Coreg. Will plan to hold lisinopril with the renal failure. Will check orthostatics and use p.r.n. hydralazine. 7. Stage 3 chronic kidney disease, baseline creatinine approximately   1.7.   8. Right knee pain, status post fall. X-ray is negative. Will use p.r.n. Tylenol and pain medications. 9. Deep venous thrombosis prophylaxis with Lovenox. 10. Code status: FULL CODE.   11. Wife is next of kin. TOTAL TIME: 65 minutes seeing and examining the patient. I reviewed   all records, including old records from [facility]. I discussed the case   with the emergency room physician. I did a detailed history and   physical examination at bedside. I did complex decision making and   formulated a diagnostic and treatment plan. CHIEF COMPLAINT: \"I suddenly passed out while I was at work   today. \"     HISTORY OF PRESENT ILLNESS: A 78-year-old    gentleman with history of systolic congestive heart failure in the past in   2012, although his most recent echo 2 weeks ago showed an LVEF of   55% to 60%. He also has known hypertension and stage 3 chronic   disease in the setting of diabetes requiring insulin. He said he had   onset of diarrhea a week or so before his admission to [facility] on   07/22/2017. The diarrhea was brown and very foul smelling at times. He can have multiple times per day, 3+ times per day. It is not clearly   associated with food contact and can occur even at night when he is in   bed sleeping. He has not had any sick contacts. He drinks city water   and does not drink from a well or had any water from streams. He had   a colonoscopy at age 46, which by his report was negative. He was admitted on 07/22/2017 to 07/25/2017 at [facility] because of   acute kidney injury and diarrhea. His creatinine at that time was 4.63.    Lisinopril was held, and he received IV fluids. He was followed by Dr. Sofia Read, and his creatinine decreased to 1.7 by discharge. He had stool   studies, including WBC, C. difficile and culture. They were all negative,   and the diarrhea seemed better. At discharge, his lisinopril was resumed. He went back to work and   generally was feeling well until the past 24 hours when the diarrhea   recurred with multiple episodes, similar to before. He went to work this   morning, and while he was on his lunch break, ready to punch out at   [employer]), he suddenly passed out. He had no warning that this was   going to happen. He was not feeling lightheaded when he got up. He   had no palpitations, nausea, vomiting, or diaphoresis. He awoke with   EMS there, and they brought him into the emergency room. He denied   any unusual headaches, severe neck pain, chest pain, shortness of   breath, cough, orthopnea, nausea, vomiting, abdominal pain, fevers,   chills. He did have some mild right knee pain. He said he hit the knee   when he fell, but otherwise did not think he injured himself. In the emergency room, he was found to have a creatinine of 3. His   CPK and troponin were negative. Head CT scan was negative. C-spine   CT scan was negative. Right knee and L-spine x-rays were negative   for any fractures. He was given normal saline bolus. Renal was   contacted. We were asked to admit the patient. PAST MEDICAL HISTORY   1. Essential hypertension. 2. Chronic kidney disease, stage 3.   3. 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 4. Prior systolic congestive heart failure, 2012, EF was 20% to 30%. Most recently, however, his ejection fraction 55% to 60%. ALLERGIES   1. FLEXERIL. 2. NEURONTIN. CURRENT MEDICATIONS   1. Aspirin 81 mg p.o. daily. 2. Norvasc 5 mg p.o. daily. 3. Coreg 25 mg p.o. b.i.d.   4. Doxazosin 4 mg p.o. daily. 5. Levemir insulin 20 units subcutaneous at bedtime. 6. Lisinopril 20 mg p.o. daily. 7. KCl 20 mEq p.o. daily. FAMILY HISTORY: Three children, who are healthy. Father is alive   and generally healthy. Mother is alive and has hypertension. A sister   has hypertension as well. SOCIAL HISTORY: He does not smoke or drink. He lives with his wife,   who is his next of kin. He is a FULL CODE. He works at [employer]. REVIEW OF SYSTEMS   CONSTITUTIONAL: No fevers, chills, or significant weight loss. EYES: No visual changes. No eye pain. EARS, NOSE AND THROAT: No difficulty swallowing, no sore throat,   no runny nose. RESPIRATORY: No cough or sputum. No shortness of breath. CARDIOVASCULAR: No substernal chest pain or orthopnea. No lower   extremity edema. ABDOMEN: No abdominal pain. No nausea, vomiting. Positive watery,   foul-smelling diarrhea. No melena or bright red blood per rectum. GENITOURINARY: No dysuria or hematuria. MUSCULOSKELETAL: No acute joint swelling, erythema, or deformity. No cyanosis or clubbing. SKIN: No rashes or ulcers. NEUROLOGIC: No headaches. No focal motor or sensory changes. HEMATOLOGIC: No easy bruising or bleeding. PHYSICAL EXAMINATION   VITAL SIGNS: Blood pressure 151/86, heart rate 78, respiratory rate   18, temperature 98.3, saturating 99% to 100% on room air. GENERAL: He is a pleasant  gentleman, sitting in   bed in no distress. HEENT: Normocephalic, atraumatic. Pupils equal, round, reactive. Sclerae nonicteric. Conjunctivae and lids appear normal. Nasal   mucosa without masses or discharge. Sinuses nontender. Hearing is   intact to voice. Oropharynx is without oropharyngeal erythema or   exudates. NECK: No meningismus. Trachea is midline. No carotid bruits. Thyroid   is not enlarged. No nodules or tenderness. RESPIRATORY: No accessory muscle use or retractions. Clear to   auscultation and percussion bilaterally. CARDIAC: Regular rate and rhythm.  Normal S1, normal S2, no murmurs, rubs, or gallops. No lower extremity edema. ABDOMEN: Soft, nontender, nondistended. Bowel sounds are present. No rebound or guarding. No masses or hepatosplenomegaly. LYMPH NODES: No cervical or inguinal lymphadenopathy. MUSCULOSKELETAL: No acute joint swelling, erythema, or deformity. No cyanosis or clubbing. SKIN: No rashes or ulcers. NEUROLOGIC: Awake, alert, oriented x3. Cranial nerves 2 through 12   were intact. He moved all his limbs symmetrically. LABORATORY DATA: White blood cell count 7.4, hemoglobin 10.2,   platelet count 562. MCV is 78. Sodium 138, potassium 4.3, chloride   106, CO2 of 25, BUN 31, creatinine 3.00, glucose 82. ,   troponin less than 0.04. Calcium 8.7, albumin 3.0, AST 16, ,   alkaline phosphatase 121, T bilirubin 0.6. ProBNP is 1186. EKG is normal sinus rhythm with a rate of 76, normal axis, no LVH,   normal intervals. There are no ischemic ST-T wave changes. Urinalysis is ordered and is pending. CT scan of the head shows no acute intracranial process. C-spine CT   scan shows no fracture or subluxation. There is degenerative disk   disease. Right knee x-ray shows no evidence of fracture. L-spine x-ray   shows degenerative disk disease, but no evidence of fracture or   subluxation. MD CHRISTOFER Page 23 / PIM   D:  08/05/2017   00:19   T:  08/05/2017   02:09   Job #:  [id number]

## 2017-08-05 NOTE — CARDIO/PULMONARY
C/P rehab note- chart reviewed. Adm with syncope and collapse-passed out at work suddenly. Hospitalist states possibly related to hypovolemia with recurrent diarrhea and new acute kidney injury. HX includes DM Type 1,HTN,CKD  Prior CHF in 2012. LVEF was 55-60% 2 weeks ago. Former smoker. To have cardiac enzymes repeated. no previous CHF teaching by cardiac rehab noted. Will follow for teaching if appropriate.

## 2017-08-05 NOTE — ED TRIAGE NOTES
Patient reports using the restroom a lot 2 days ago. Patient reported to having diarrhea for several days and feels like he's dehydrated. Patient states he was just released from the hospital for the same thing about 10 days ago. Patient states that a coworker saw him fall and EMS was called at that point. Patient updated on plan of care and patient moved into room. Patient's wife at bedside.

## 2017-08-05 NOTE — PROGRESS NOTES
Bedside and Verbal shift change report given to Sunil Dowling RN   (oncoming nurse) by Patricia Muir RN (offgoing nurse). Report included the following information SBAR, Kardex, ED Summary, Intake/Output, MAR, Accordion and Recent Results. 1500  Pt had an 8 run of Vtach, pt is asymptomatic. Pt is still having diarrhea. 6:55 PM  Pt had an episode of vomiting in the bathroom sink. Stool and urine samples sent, pt resting in chair with no nausea. Will continue to monitor.

## 2017-08-05 NOTE — PROGRESS NOTES
TRANSFER - IN REPORT:    Verbal report received from ENRIQUE Contreras(name) on Jules So  being received from ED(unit) for routine progression of care      Report consisted of patients Situation, Background, Assessment and   Recommendations(SBAR). Information from the following report(s) SBAR, Kardex, ED Summary, Procedure Summary, Intake/Output, MAR, Accordion and Recent Results was reviewed with the receiving nurse. Opportunity for questions and clarification was provided. Assessment completed upon patients arrival to unit and care assumed. Primary Nurse Merari Joy and ENRIQUE Garcia performed a dual skin assessment on this patient Impairment noted- see wound doc flow sheet  Abrasion noted to lower back- patient states burn from heating pad, was dressed in ED.   Phi score is 20

## 2017-08-06 LAB
ALBUMIN SERPL BCP-MCNC: 2.8 G/DL (ref 3.5–5)
ALBUMIN/GLOB SERPL: 0.6 {RATIO} (ref 1.1–2.2)
ALP SERPL-CCNC: 112 U/L (ref 45–117)
ALT SERPL-CCNC: 137 U/L (ref 12–78)
ANION GAP BLD CALC-SCNC: 6 MMOL/L (ref 5–15)
AST SERPL W P-5'-P-CCNC: 50 U/L (ref 15–37)
BILIRUB SERPL-MCNC: 0.5 MG/DL (ref 0.2–1)
BUN SERPL-MCNC: 27 MG/DL (ref 6–20)
BUN/CREAT SERPL: 13 (ref 12–20)
CALCIUM SERPL-MCNC: 8.3 MG/DL (ref 8.5–10.1)
CHLORIDE SERPL-SCNC: 110 MMOL/L (ref 97–108)
CO2 SERPL-SCNC: 24 MMOL/L (ref 21–32)
CREAT SERPL-MCNC: 2.02 MG/DL (ref 0.7–1.3)
ERYTHROCYTE [DISTWIDTH] IN BLOOD BY AUTOMATED COUNT: 16.3 % (ref 11.5–14.5)
GLOBULIN SER CALC-MCNC: 4.6 G/DL (ref 2–4)
GLUCOSE BLD STRIP.AUTO-MCNC: 110 MG/DL (ref 65–100)
GLUCOSE BLD STRIP.AUTO-MCNC: 75 MG/DL (ref 65–100)
GLUCOSE BLD STRIP.AUTO-MCNC: 83 MG/DL (ref 65–100)
GLUCOSE BLD STRIP.AUTO-MCNC: 85 MG/DL (ref 65–100)
GLUCOSE BLD STRIP.AUTO-MCNC: 96 MG/DL (ref 65–100)
GLUCOSE SERPL-MCNC: 89 MG/DL (ref 65–100)
HCT VFR BLD AUTO: 30.9 % (ref 36.6–50.3)
HGB BLD-MCNC: 10.2 G/DL (ref 12.1–17)
MAGNESIUM SERPL-MCNC: 1.8 MG/DL (ref 1.6–2.4)
MCH RBC QN AUTO: 26.4 PG (ref 26–34)
MCHC RBC AUTO-ENTMCNC: 33 G/DL (ref 30–36.5)
MCV RBC AUTO: 79.8 FL (ref 80–99)
PHOSPHATE SERPL-MCNC: 2.9 MG/DL (ref 2.6–4.7)
PLATELET # BLD AUTO: 318 K/UL (ref 150–400)
POTASSIUM SERPL-SCNC: 3.6 MMOL/L (ref 3.5–5.1)
PROT SERPL-MCNC: 7.4 G/DL (ref 6.4–8.2)
RBC # BLD AUTO: 3.87 M/UL (ref 4.1–5.7)
SERVICE CMNT-IMP: ABNORMAL
SERVICE CMNT-IMP: NORMAL
SODIUM SERPL-SCNC: 140 MMOL/L (ref 136–145)
WBC # BLD AUTO: 8 K/UL (ref 4.1–11.1)

## 2017-08-06 PROCEDURE — 83735 ASSAY OF MAGNESIUM: CPT | Performed by: INTERNAL MEDICINE

## 2017-08-06 PROCEDURE — 85027 COMPLETE CBC AUTOMATED: CPT | Performed by: INTERNAL MEDICINE

## 2017-08-06 PROCEDURE — 80053 COMPREHEN METABOLIC PANEL: CPT | Performed by: INTERNAL MEDICINE

## 2017-08-06 PROCEDURE — 74011000250 HC RX REV CODE- 250: Performed by: INTERNAL MEDICINE

## 2017-08-06 PROCEDURE — 84100 ASSAY OF PHOSPHORUS: CPT | Performed by: INTERNAL MEDICINE

## 2017-08-06 PROCEDURE — 36415 COLL VENOUS BLD VENIPUNCTURE: CPT | Performed by: INTERNAL MEDICINE

## 2017-08-06 PROCEDURE — 74011636637 HC RX REV CODE- 636/637: Performed by: INTERNAL MEDICINE

## 2017-08-06 PROCEDURE — 74011250637 HC RX REV CODE- 250/637: Performed by: INTERNAL MEDICINE

## 2017-08-06 PROCEDURE — 82962 GLUCOSE BLOOD TEST: CPT

## 2017-08-06 PROCEDURE — 74011250636 HC RX REV CODE- 250/636: Performed by: INTERNAL MEDICINE

## 2017-08-06 PROCEDURE — 65660000000 HC RM CCU STEPDOWN

## 2017-08-06 RX ORDER — POTASSIUM CHLORIDE 20 MEQ/1
40 TABLET, EXTENDED RELEASE ORAL
Status: COMPLETED | OUTPATIENT
Start: 2017-08-06 | End: 2017-08-06

## 2017-08-06 RX ORDER — ENOXAPARIN SODIUM 100 MG/ML
40 INJECTION SUBCUTANEOUS EVERY 24 HOURS
Status: DISCONTINUED | OUTPATIENT
Start: 2017-08-07 | End: 2017-08-10 | Stop reason: HOSPADM

## 2017-08-06 RX ADMIN — Medication 10 ML: at 10:17

## 2017-08-06 RX ADMIN — ASPIRIN 81 MG: 81 TABLET, COATED ORAL at 10:16

## 2017-08-06 RX ADMIN — DOXAZOSIN 4 MG: 2 TABLET ORAL at 10:16

## 2017-08-06 RX ADMIN — Medication 10 ML: at 23:24

## 2017-08-06 RX ADMIN — POLYETHYLENE GLYCOL 3350, SODIUM SULFATE ANHYDROUS, SODIUM BICARBONATE, SODIUM CHLORIDE, POTASSIUM CHLORIDE 2000 ML: 236; 22.74; 6.74; 5.86; 2.97 POWDER, FOR SOLUTION ORAL at 17:16

## 2017-08-06 RX ADMIN — ENOXAPARIN SODIUM 30 MG: 30 INJECTION SUBCUTANEOUS at 10:16

## 2017-08-06 RX ADMIN — INSULIN GLARGINE 10 UNITS: 100 INJECTION, SOLUTION SUBCUTANEOUS at 23:24

## 2017-08-06 RX ADMIN — POTASSIUM CHLORIDE 40 MEQ: 1500 TABLET, EXTENDED RELEASE ORAL at 17:11

## 2017-08-06 RX ADMIN — CARVEDILOL 25 MG: 12.5 TABLET, FILM COATED ORAL at 17:05

## 2017-08-06 RX ADMIN — CARVEDILOL 25 MG: 12.5 TABLET, FILM COATED ORAL at 10:16

## 2017-08-06 RX ADMIN — SODIUM CHLORIDE 75 ML/HR: 900 INJECTION, SOLUTION INTRAVENOUS at 11:47

## 2017-08-06 RX ADMIN — AMLODIPINE BESYLATE 5 MG: 5 TABLET ORAL at 10:16

## 2017-08-06 NOTE — CONSULTS
[address]   [address] [facility]       Name:  Angela Valdes   MR#:  [id number]   :  1965   Account #:  [de-identified]    Date of Consultation:  2017   Date of Adm:  2017       REQUESTING PHYSICIAN: Danielle Banda MD.     REASON FOR CONSULTATION: Diarrhea. HISTORY OF PRESENT ILLNESS: The patient is a 47-year-old   gentleman with a history of hypertension, chronic kidney disease,   diabetes, congestive heart failure, who developed nausea, vomiting   and diarrhea several weeks ago. He was actually admitted to the [facility] on . Apparently no etiology was found. He was   mainly hydrated because of the renal failure and discharged home. Stool studies apparently including WBC, C. difficile and culture were   negative and the diarrhea had somewhat improved; however has now   recurred. Severe diarrhea, watery, nonbloody, associated with some   nausea and vomiting. He has not really had any significant abdominal   pain. [facility]. PAST MEDICAL HISTORY: Hypertension, chronic kidney disease,   type 2 diabetes, prior congestive heart failure. ALLERGIES:   1. FLEXERIL. 2. NEURONTIN. MEDICATION PRIOR TO ADMISSION:   1. Aspirin 81 mg daily. 2. Norvasc 5 mg daily. 3. Coreg 25 mg b.i.d.   4. Doxazosin 4 mg daily. 5. Levemir insulin 20 units subcu at bedtime. 6. Lisinopril 20 mg daily. 7. Potassium chloride 20 mEq daily. SOCIAL HISTORY: . Does not smoke or drink alcohol. Works at   [employer]. FAMILY HISTORY: Significant for hypertension. REVIEW OF SYSTEMS: Otherwise negative. PHYSICAL EXAMINATION:   GENERAL: a pleasant gentleman in no acute distress. VITAL SIGNS: Blood pressure 138/70, pulse 75, respirations 18. He is   afebrile. SKIN: There is no rash or jaundice. HEENT: Sclerae are anicteric. Oropharynx is clear.    NECK: Supple, without JVD. LUNGS: Clear to auscultation. CARDIAC: Regular rate and rhythm. ABDOMEN: Soft and benign. LABORATORY STUDIES: WBC 8.0, hemoglobin 9.2, hematocrit 28.5,   MCV 79, platelet count 513,782. Sodium 140, potassium 4.0, chloride   111, CO2 23, glucose 103, BUN 29, creatinine 2.6, bilirubin 0.5, ALT   27, AST 49, alkaline phosphatase 107. Iron saturation 19%, ferritin   183. IMPRESSION:   1. Nausea, vomiting, diarrhea, etiology unclear. Apparently he has had   negative stool studies at [facility]. No recent colonoscopy. No recent   antibiotics. 2. Acute kidney injury. 3. Syncope. PLAN:   1. IV hydration. Antiemetics as needed. 2. Will need to undergo colonoscopy. SILVER Lagos MD        / Edna Khalil   D:  08/05/2017   20:31   T:  08/06/2017   00:24   Job #:  [id number]

## 2017-08-06 NOTE — PROGRESS NOTES
[address] SHIFT NURSING NOTE    Bedside shift change report given to Dyllan Solano RN (oncoming nurse) by Sarah Zepeda RN (offgoing nurse). Report included the following information SBAR, Kardex, MAR, Recent Results and Cardiac Rhythm NSR.    SHIFT SUMMARY: 15:57 - Consents obtained for colonoscopy in AM.  Patient is aware he is NPO after midnight and only clears until then. [facility] has patient allergic to Flexeril and Gabapentin, but patient states this is incorrect. Until cleared, these allergies were listed on his consent. 16:25 - BS 75.  Given apple juice per protocol. 16:45 - BS 85.    18:58 - Bowel prep complete. Patient having loose stools. Admission Date 8/4/2017   Admission Diagnosis Syncope and collapse   Consults IP CONSULT TO NEPHROLOGY  IP CONSULT TO GASTROENTEROLOGY        Consults   [] PT   [] OT   [] Speech   [] Palliative      [] Hospice    [] Case Management   [x] None   Cardiac Monitoring   [x] Yes   [] No     Antibiotics   [] Yes   [x] No   GI Prophylaxis  (Ex: Protonix, Pepcid, etc,.)   [] Yes   [x] No          DVT Prophylaxis   SCDs:             Han stockings:         [x] Medication (Ex: Lovenox, Eliquis, Brilinta, Coumadin,  Heparin, etc..)   [] Contraindicated   [] No VTE needed       Urinary Catheter             LDAs               Peripheral IV 07/21/17 Right Antecubital (Active)       Peripheral IV 07/21/17 Left Forearm (Active)       Peripheral IV 08/04/17 Left Antecubital (Active)   Site Assessment Clean, dry, & intact 8/6/2017  2:31 PM   Phlebitis Assessment 0 8/6/2017  2:31 PM   Infiltration Assessment 0 8/6/2017  2:31 PM   Dressing Status Clean, dry, & intact 8/6/2017  2:31 PM   Dressing Type Tape;Transparent 8/6/2017  2:31 PM   Hub Color/Line Status Pink; Infusing 8/6/2017  2:31 PM   Action Taken Blood drawn 8/4/2017  7:12 PM                      I/Os   Intake/Output Summary (Last 24 hours) at 08/06/17 1543  Last data filed at 08/06/17 1431   Gross per 24 hour   Intake 2400.42 ml   Output              300 ml   Net          2100.42 ml         Activity Level Activity Level: Up with Assistance     Activity Assistance: No assistance needed   Diet Active Orders   Diet    DIET DIABETIC CLEAR LIQUID    DIET NPO      Purposeful Rounding every 1-2 hour? [x] Yes    Vera Score  Total Score: 2   Bed Alarm (If score 3 or >)   [] Yes    [] Refused (See signed refusal form in chart)   Phi Score  Phi Score: 21       Phi Score (if score 14 or less)   [] PMT consult   [] Nutrition consult   [x] Wound Care consult      []  Specialty bed         Influenza Vaccine Received Flu Vaccine for Current Season (usually Sept-March): Not Flu Season               Needs prior to discharge:   Home O2 required:    [] Yes   [x] No     If yes, how much O2 required? Other:    Last Bowel Movement Date: 08/06/17   Readmission Risk Assessment Tool Score Medium Risk            17       Total Score        3 Has Seen PCP in Last 6 Months (Yes=3, No=0)    2 . Living with Significant Other. Assisted Living. LTAC. SNF. or   Rehab    4 IP Visits Last 12 Months (1-3=4, 4=9, >4=11)    8 Charlson Comorbidity Score (Age + Comorbid Conditions)        Criteria that do not apply:    Patient Length of Stay (>5 days = 3)    Pt.  Coverage (Medicare=5 , Medicaid, or Self-Pay=4)       Expected Length of Stay - - -   Actual Length of Stay 2

## 2017-08-06 NOTE — PROGRESS NOTES
GI PROGRESS NOTE    NAME:             Yanna Andino   :              1965   MRN:              [id number]   Admit Date:     2017  Todays Date:  2017      Subjective:           Still having diarrhea. No significant abdominal pain.     Medications-reviewed     Current Facility-Administered Medications   Medication Dose Route Frequency    insulin lispro (HUMALOG) injection   SubCUTAneous AC&HS    glucose chewable tablet 16 g  4 Tab Oral PRN    glucagon (GLUCAGEN) injection 1 mg  1 mg IntraMUSCular PRN    0.9% sodium chloride infusion  75 mL/hr IntraVENous CONTINUOUS    amLODIPine (NORVASC) tablet 5 mg  5 mg Oral DAILY    carvedilol (COREG) tablet 25 mg  25 mg Oral BID WITH MEALS    doxazosin (CARDURA) tablet 4 mg  4 mg Oral DAILY    aspirin delayed-release tablet 81 mg  81 mg Oral DAILY    insulin glargine (LANTUS) injection 10 Units  10 Units SubCUTAneous QHS    hydrALAZINE (APRESOLINE) 20 mg/mL injection 20 mg  20 mg IntraVENous Q6H PRN    dextrose 10 % infusion 125-250 mL  125-250 mL IntraVENous PRN    sodium chloride (NS) flush 5-10 mL  5-10 mL IntraVENous Q8H    sodium chloride (NS) flush 5-10 mL  5-10 mL IntraVENous PRN    sodium chloride (NS) flush 5-10 mL  5-10 mL IntraVENous Q8H    sodium chloride (NS) flush 5-10 mL  5-10 mL IntraVENous PRN    acetaminophen (TYLENOL) tablet 650 mg  650 mg Oral Q6H PRN    oxyCODONE-acetaminophen (PERCOCET) 5-325 mg per tablet 1 Tab  1 Tab Oral Q6H PRN    naloxone (NARCAN) injection 0.4 mg  0.4 mg IntraVENous PRN    ondansetron (ZOFRAN) injection 4 mg  4 mg IntraVENous Q4H PRN    bisacodyl (DULCOLAX) suppository 10 mg  10 mg Rectal DAILY PRN    enoxaparin (LOVENOX) injection 30 mg  30 mg SubCUTAneous Q24H        Objective:   Patient Vitals for the past 8 hrs:   BP Temp Pulse Resp SpO2   17 0731 128/76 98.1 °F (36.7 °C) 72 20 100 %   17 0308 149/79 97.6 °F (36.4 °C) 65 18 100 %         1901 -  0700  In: -   Out: 300 [Urine:300]    EXAM:     NEURO-a&o   HEENT-wnl   LUNGS-clear   COR-regular rate and rhythym   ABD-soft , no tenderness, no rebound, good bs       LABS:  Recent Labs      08/06/17 0402 08/05/17 0124   WBC  8.0  8.0   HGB  10.2*  9.2*   HCT  30.9*  28.5*   PLT  318  293     Recent Labs      08/06/17 0402 08/05/17 0124 08/04/17   1907   NA  140  140  138   K  3.6  4.0  4.3   CL  110*  111*  106   CO2  24  23  25   BUN  27*  29*  31*   CREA  2.02*  2.59*  3.00*   GLU  89  103*  82   CA  8.3*  7.9*  8.7   MG  1.8   --    --    PHOS  2.9   --    --      Recent Labs      08/06/17 0402 08/05/17 0124 08/04/17   1907   SGOT  50*  49*  60*   AP  112  107  121*   TBILI  0.5  0.5  0.6   TP  7.4  6.8  7.8   ALB  2.8*  2.6*  3.0*   GLOB  4.6*  4.2*  4.8*   LPSE   --    --   56*   ALT  137*  127*  142*        Recent Labs      08/05/17 0124   TIBC  189*   PSAT  19*   FERR  183                         Assessment:   1. Acute diarrhea of several weeks duration. Stool studies negative when he was at [facility]. Was not seen by GI then  2. Acute kidney injury---improved with hydration  3. Syncopal episode leading to admission  4. Type II DM         Active Problems:    Syncope and collapse (8/4/2017)        Plan:   1. Colonoscopy Monday  2.  Limited bowel prep since so much diarrhea already

## 2017-08-06 NOTE — PROGRESS NOTES
Patient name: Carol Martinez  MRN: [id number]    Nephrology Progress note:    Assessment:  ELMER on CKD-3: Showing improvement with IVF. Cr down to 2.0mg/dl. 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 GI consulting  Hx of CM: last echo showed improved EF 55-60% (7/22/17)    Plan/Recommendations:  Ct IVF  Oral KCl 40meq x1 dose  Colonoscopy tomorrow  Ct to hold lisinopril  Strict I/Os,avoid nephrotoxins, renally adjust new meds  Am labs      Subjective:  Still having significant diarrhea-> unchanged. +emesis last night . I/O not fully recorded    ROS:   No chest pain, no shortness of breath    Exam:  Visit Vitals    /78    Pulse 68    Temp 97.6 °F (36.4 °C)    Resp 20    Ht 5' 9\" (1.753 m)    Wt 98.4 kg (217 lb)    SpO2 100%    BMI 32.05 kg/m2     Wt Readings from Last 3 Encounters:   08/04/17 98.4 kg (217 lb)   07/25/17 101.5 kg (223 lb 11.2 oz)   07/21/17 97.9 kg (215 lb 14.4 oz)       Intake/Output Summary (Last 24 hours) at 08/06/17 1444  Last data filed at 08/06/17 1431   Gross per 24 hour   Intake          2400.42 ml   Output              300 ml   Net          2100.42 ml       Gen: NAD  HEENT: No icterus, mmm, no oral exudate, AT/NC  Lungs/Chest wall: Clear. No accessory muscle use. Cardiovascular: Regular rate, normal rhythm. Abdomen/: Soft, NT, ND, BS+. Ext:  No peripheral edema  CNS: alert and awake.  Answers appropriately      Current Facility-Administered Medications   Medication Dose Route Frequency Last Dose    peg 3350-electrolytes (COLYTE) 4000 mL  2,000 mL Oral ONCE      potassium chloride (K-DUR, KLOR-CON) SR tablet 40 mEq  40 mEq Oral NOW      insulin lispro (HUMALOG) injection   SubCUTAneous AC&HS Stopped at 08/05/17 0730    glucose chewable tablet 16 g  4 Tab Oral PRN      glucagon (GLUCAGEN) injection 1 mg  1 mg IntraMUSCular PRN      0.9% sodium chloride infusion  75 mL/hr IntraVENous CONTINUOUS 75 mL/hr at 08/06/17 1147    amLODIPine (NORVASC) tablet 5 mg  5 mg Oral DAILY 5 mg at 08/06/17 1016    carvedilol (COREG) tablet 25 mg  25 mg Oral BID WITH MEALS 25 mg at 08/06/17 1016    doxazosin (CARDURA) tablet 4 mg  4 mg Oral DAILY 4 mg at 08/06/17 1016    aspirin delayed-release tablet 81 mg  81 mg Oral DAILY 81 mg at 08/06/17 1016    insulin glargine (LANTUS) injection 10 Units  10 Units SubCUTAneous QHS 10 Units at 08/05/17 2214    hydrALAZINE (APRESOLINE) 20 mg/mL injection 20 mg  20 mg IntraVENous Q6H PRN      dextrose 10 % infusion 125-250 mL  125-250 mL IntraVENous PRN      sodium chloride (NS) flush 5-10 mL  5-10 mL IntraVENous Q8H 10 mL at 08/05/17 0634    sodium chloride (NS) flush 5-10 mL  5-10 mL IntraVENous PRN      sodium chloride (NS) flush 5-10 mL  5-10 mL IntraVENous Q8H 10 mL at 08/06/17 1017    sodium chloride (NS) flush 5-10 mL  5-10 mL IntraVENous PRN      acetaminophen (TYLENOL) tablet 650 mg  650 mg Oral Q6H PRN      oxyCODONE-acetaminophen (PERCOCET) 5-325 mg per tablet 1 Tab  1 Tab Oral Q6H PRN      naloxone (NARCAN) injection 0.4 mg  0.4 mg IntraVENous PRN      ondansetron (ZOFRAN) injection 4 mg  4 mg IntraVENous Q4H PRN      bisacodyl (DULCOLAX) suppository 10 mg  10 mg Rectal DAILY PRN      enoxaparin (LOVENOX) injection 30 mg  30 mg SubCUTAneous Q24H 30 mg at 08/06/17 1016       Labs/Data:    Lab Results   Component Value Date/Time    WBC 8.0 08/06/2017 04:02 AM    Hemoglobin (POC) 10.5 07/21/2017 09:27 PM    HGB 10.2 08/06/2017 04:02 AM    Hematocrit (POC) 31 07/21/2017 09:27 PM    HCT 30.9 08/06/2017 04:02 AM    PLATELET 430 42/51/1539 04:02 AM    MCV 79.8 08/06/2017 04:02 AM       Lab Results   Component Value Date/Time    Sodium 140 08/06/2017 04:02 AM    Potassium 3.6 08/06/2017 04:02 AM    Chloride 110 08/06/2017 04:02 AM    CO2 24 08/06/2017 04:02 AM    Anion gap 6 08/06/2017 04:02 AM    Glucose 89 08/06/2017 04:02 AM    BUN 27 08/06/2017 04:02 AM    Creatinine 2.02 08/06/2017 04:02 AM    BUN/Creatinine ratio 13 08/06/2017 04:02 AM    GFR est AA 42 08/06/2017 04:02 AM    GFR est non-AA 35 08/06/2017 04:02 AM    Calcium 8.3 08/06/2017 04:02 AM       Patient seen and examined. Chart reviewed. Labs, data and other pertinent notes reviewed in last 24 hrs.     Discussed with patient    Signed by:  Chavo Damon MD

## 2017-08-07 ENCOUNTER — ANESTHESIA (OUTPATIENT)
Dept: ENDOSCOPY | Age: 52
DRG: 312 | End: 2017-08-07
Payer: COMMERCIAL

## 2017-08-07 ENCOUNTER — ANESTHESIA EVENT (OUTPATIENT)
Dept: ENDOSCOPY | Age: 52
DRG: 312 | End: 2017-08-07
Payer: COMMERCIAL

## 2017-08-07 LAB
ALBUMIN SERPL BCP-MCNC: 2.6 G/DL (ref 3.5–5)
ALBUMIN/GLOB SERPL: 0.6 {RATIO} (ref 1.1–2.2)
ALP SERPL-CCNC: 100 U/L (ref 45–117)
ALT SERPL-CCNC: 114 U/L (ref 12–78)
ANION GAP BLD CALC-SCNC: 6 MMOL/L (ref 5–15)
AST SERPL W P-5'-P-CCNC: 34 U/L (ref 15–37)
BILIRUB SERPL-MCNC: 0.4 MG/DL (ref 0.2–1)
BUN SERPL-MCNC: 19 MG/DL (ref 6–20)
BUN/CREAT SERPL: 11 (ref 12–20)
CALCIUM SERPL-MCNC: 8.3 MG/DL (ref 8.5–10.1)
CHLORIDE SERPL-SCNC: 114 MMOL/L (ref 97–108)
CO2 SERPL-SCNC: 23 MMOL/L (ref 21–32)
CREAT SERPL-MCNC: 1.67 MG/DL (ref 0.7–1.3)
ERYTHROCYTE [DISTWIDTH] IN BLOOD BY AUTOMATED COUNT: 16.2 % (ref 11.5–14.5)
GLOBULIN SER CALC-MCNC: 4.2 G/DL (ref 2–4)
GLUCOSE BLD STRIP.AUTO-MCNC: 101 MG/DL (ref 65–100)
GLUCOSE BLD STRIP.AUTO-MCNC: 83 MG/DL (ref 65–100)
GLUCOSE BLD STRIP.AUTO-MCNC: 88 MG/DL (ref 65–100)
GLUCOSE BLD STRIP.AUTO-MCNC: 88 MG/DL (ref 65–100)
GLUCOSE SERPL-MCNC: 75 MG/DL (ref 65–100)
HCT VFR BLD AUTO: 29.4 % (ref 36.6–50.3)
HGB BLD-MCNC: 9.6 G/DL (ref 12.1–17)
MAGNESIUM SERPL-MCNC: 1.7 MG/DL (ref 1.6–2.4)
MCH RBC QN AUTO: 25.9 PG (ref 26–34)
MCHC RBC AUTO-ENTMCNC: 32.7 G/DL (ref 30–36.5)
MCV RBC AUTO: 79.5 FL (ref 80–99)
PHOSPHATE SERPL-MCNC: 2.6 MG/DL (ref 2.6–4.7)
PLATELET # BLD AUTO: 289 K/UL (ref 150–400)
POTASSIUM SERPL-SCNC: 3.7 MMOL/L (ref 3.5–5.1)
PROT SERPL-MCNC: 6.8 G/DL (ref 6.4–8.2)
RBC # BLD AUTO: 3.7 M/UL (ref 4.1–5.7)
SERVICE CMNT-IMP: ABNORMAL
SERVICE CMNT-IMP: NORMAL
SODIUM SERPL-SCNC: 143 MMOL/L (ref 136–145)
WBC # BLD AUTO: 6.9 K/UL (ref 4.1–11.1)

## 2017-08-07 PROCEDURE — 74011250636 HC RX REV CODE- 250/636

## 2017-08-07 PROCEDURE — 74011636637 HC RX REV CODE- 636/637: Performed by: INTERNAL MEDICINE

## 2017-08-07 PROCEDURE — 74011250637 HC RX REV CODE- 250/637: Performed by: INTERNAL MEDICINE

## 2017-08-07 PROCEDURE — 76040000019: Performed by: INTERNAL MEDICINE

## 2017-08-07 PROCEDURE — 83735 ASSAY OF MAGNESIUM: CPT | Performed by: INTERNAL MEDICINE

## 2017-08-07 PROCEDURE — 77030018836 HC SOL IRR NACL ICUM -A

## 2017-08-07 PROCEDURE — 74011250636 HC RX REV CODE- 250/636: Performed by: INTERNAL MEDICINE

## 2017-08-07 PROCEDURE — 77030011256 HC DRSG MEPILEX <16IN NO BORD MOLN -A

## 2017-08-07 PROCEDURE — 0DBE8ZX EXCISION OF LARGE INTESTINE, VIA NATURAL OR ARTIFICIAL OPENING ENDOSCOPIC, DIAGNOSTIC: ICD-10-PCS | Performed by: INTERNAL MEDICINE

## 2017-08-07 PROCEDURE — 84100 ASSAY OF PHOSPHORUS: CPT | Performed by: INTERNAL MEDICINE

## 2017-08-07 PROCEDURE — 36415 COLL VENOUS BLD VENIPUNCTURE: CPT | Performed by: INTERNAL MEDICINE

## 2017-08-07 PROCEDURE — 76060000031 HC ANESTHESIA FIRST 0.5 HR: Performed by: INTERNAL MEDICINE

## 2017-08-07 PROCEDURE — 65270000015 HC RM PRIVATE ONCOLOGY

## 2017-08-07 PROCEDURE — 85027 COMPLETE CBC AUTOMATED: CPT | Performed by: INTERNAL MEDICINE

## 2017-08-07 PROCEDURE — 77030009426 HC FCPS BIOP ENDOSC BSC -B: Performed by: INTERNAL MEDICINE

## 2017-08-07 PROCEDURE — 88305 TISSUE EXAM BY PATHOLOGIST: CPT | Performed by: INTERNAL MEDICINE

## 2017-08-07 PROCEDURE — 74011000250 HC RX REV CODE- 250

## 2017-08-07 PROCEDURE — 82962 GLUCOSE BLOOD TEST: CPT

## 2017-08-07 PROCEDURE — 80053 COMPREHEN METABOLIC PANEL: CPT | Performed by: INTERNAL MEDICINE

## 2017-08-07 RX ORDER — SILVER SULFADIAZINE 10 G/1000G
CREAM TOPICAL DAILY
Status: DISCONTINUED | OUTPATIENT
Start: 2017-08-08 | End: 2017-08-10 | Stop reason: HOSPADM

## 2017-08-07 RX ORDER — HYDRALAZINE HYDROCHLORIDE 20 MG/ML
10 INJECTION INTRAMUSCULAR; INTRAVENOUS
Status: DISCONTINUED | OUTPATIENT
Start: 2017-08-07 | End: 2017-08-10 | Stop reason: HOSPADM

## 2017-08-07 RX ORDER — LOPERAMIDE HYDROCHLORIDE 2 MG/1
4 CAPSULE ORAL
Status: DISCONTINUED | OUTPATIENT
Start: 2017-08-07 | End: 2017-08-10 | Stop reason: HOSPADM

## 2017-08-07 RX ORDER — SODIUM CHLORIDE 0.9 % (FLUSH) 0.9 %
5-10 SYRINGE (ML) INJECTION EVERY 8 HOURS
Status: ACTIVE | OUTPATIENT
Start: 2017-08-07 | End: 2017-08-07

## 2017-08-07 RX ORDER — SODIUM CHLORIDE 0.9 % (FLUSH) 0.9 %
5-10 SYRINGE (ML) INJECTION AS NEEDED
Status: DISCONTINUED | OUTPATIENT
Start: 2017-08-07 | End: 2017-08-07 | Stop reason: SDUPTHER

## 2017-08-07 RX ORDER — PROPOFOL 10 MG/ML
INJECTION, EMULSION INTRAVENOUS AS NEEDED
Status: DISCONTINUED | OUTPATIENT
Start: 2017-08-07 | End: 2017-08-07 | Stop reason: HOSPADM

## 2017-08-07 RX ORDER — LIDOCAINE HYDROCHLORIDE 20 MG/ML
INJECTION, SOLUTION EPIDURAL; INFILTRATION; INTRACAUDAL; PERINEURAL AS NEEDED
Status: DISCONTINUED | OUTPATIENT
Start: 2017-08-07 | End: 2017-08-07 | Stop reason: HOSPADM

## 2017-08-07 RX ADMIN — CARVEDILOL 25 MG: 12.5 TABLET, FILM COATED ORAL at 10:46

## 2017-08-07 RX ADMIN — ENOXAPARIN SODIUM 40 MG: 40 INJECTION SUBCUTANEOUS at 11:08

## 2017-08-07 RX ADMIN — DOXAZOSIN 4 MG: 2 TABLET ORAL at 10:48

## 2017-08-07 RX ADMIN — CARVEDILOL 25 MG: 12.5 TABLET, FILM COATED ORAL at 17:19

## 2017-08-07 RX ADMIN — SODIUM CHLORIDE 75 ML/HR: 900 INJECTION, SOLUTION INTRAVENOUS at 04:37

## 2017-08-07 RX ADMIN — SODIUM CHLORIDE 75 ML/HR: 900 INJECTION, SOLUTION INTRAVENOUS at 10:58

## 2017-08-07 RX ADMIN — SODIUM CHLORIDE 75 ML/HR: 900 INJECTION, SOLUTION INTRAVENOUS at 09:27

## 2017-08-07 RX ADMIN — Medication 5 ML: at 04:37

## 2017-08-07 RX ADMIN — Medication 10 ML: at 21:27

## 2017-08-07 RX ADMIN — AMLODIPINE BESYLATE 5 MG: 5 TABLET ORAL at 10:47

## 2017-08-07 RX ADMIN — LOPERAMIDE HYDROCHLORIDE 4 MG: 2 CAPSULE ORAL at 11:02

## 2017-08-07 RX ADMIN — INSULIN GLARGINE 10 UNITS: 100 INJECTION, SOLUTION SUBCUTANEOUS at 23:22

## 2017-08-07 RX ADMIN — PROPOFOL 280 MG: 10 INJECTION, EMULSION INTRAVENOUS at 09:44

## 2017-08-07 RX ADMIN — Medication 10 ML: at 15:12

## 2017-08-07 RX ADMIN — SODIUM CHLORIDE 50 ML/HR: 900 INJECTION, SOLUTION INTRAVENOUS at 20:38

## 2017-08-07 RX ADMIN — LIDOCAINE HYDROCHLORIDE 40 MG: 20 INJECTION, SOLUTION EPIDURAL; INFILTRATION; INTRACAUDAL; PERINEURAL at 09:30

## 2017-08-07 RX ADMIN — ASPIRIN 81 MG: 81 TABLET, COATED ORAL at 10:46

## 2017-08-07 NOTE — PROGRESS NOTES
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 Subjective:     Chief Complaint:  \" I feel much better. \"  No dyspnea. No N/V. No HA. Review of Systems:    Symptom Y/N Comments  Symptom Y/N Comments   Fever/Chills    Chest Pain     Poor Appetite    Edema     Cough    Abdominal Pain     Sputum    Joint Pain     SOB/ANGULO    Pruritis/Rash     Nausea/vomit    Tolerating PT/OT     Diarrhea    Tolerating Diet     Constipation    Other       Could not obtain due to:      Objective:     VITALS:   Last 24hrs VS reviewed since prior progress note.  Most recent are:  Visit Vitals    /90 (BP 1 Location: Right arm, BP Patient Position: Sitting)    Pulse 60    Temp 97.4 °F (36.3 °C)    Resp 16    Ht 5' 9\" (1.753 m)    Wt 98.4 kg (217 lb)    SpO2 98%    BMI 32.05 kg/m2       Intake/Output Summary (Last 24 hours) at 17 1356  Last data filed at 17 1005   Gross per 24 hour   Intake          1076.25 ml   Output                0 ml   Net          1076.25 ml      Telemetry Reviewed:     PHYSICAL EXAM:  General: NAD  Few rhonchi  Abd soft        Lab Data Reviewed: (see below)    Medications Reviewed: (see below)    PMH/SH reviewed - no change compared to H&P  ________________________________________________________________________  Care Plan discussed with:  Patient     Family      RN     Care Manager                    Consultant:          Comments   >50% of visit spent in counseling and coordination of care       ________________________________________________________________________  Lauryn Cadet MD     Procedures: see electronic medical records for all procedures/Xrays and details which  were not copied into this note but were reviewed prior to creation of Plan. LABS:  Recent Labs      08/07/17   0233  08/06/17   0402   WBC  6.9  8.0   HGB  9.6*  10.2*   HCT  29.4*  30.9*   PLT  289  318     Recent Labs      08/07/17   0233  08/06/17   0402  08/05/17   0124   NA  143  140  140   K  3.7  3.6  4.0   CL  114*  110*  111*   CO2  23  24  23   BUN  19  27*  29*   CREA  1.67*  2.02*  2.59*   GLU  75  89  103*   CA  8.3*  8.3*  7.9*   MG  1.7  1.8   --    PHOS  2.6  2.9   --      Recent Labs      08/07/17   0233  08/06/17   0402  08/05/17   0124  08/04/17   1907   SGOT  34  50*  49*  60*   AP  100  112  107  121*   TP  6.8  7.4  6.8  7.8   ALB  2.6*  2.8*  2.6*  3.0*   GLOB  4.2*  4.6*  4.2*  4.8*   LPSE   --    --    --   56*     No results for input(s): INR, PTP, APTT in the last 72 hours. No lab exists for component: INREXT   Recent Labs      08/05/17 0124   TIBC  189*   PSAT  19*   FERR  183      No results found for: FOL, RBCF   No results for input(s): PH, PCO2, PO2 in the last 72 hours.   Recent Labs      08/05/17 0124   CPK  174   CKMB  3.6*     No components found for: [place]  Lab Results   Component Value Date/Time    Color YELLOW/STRAW 08/05/2017 07:00 PM    Appearance CLOUDY 08/05/2017 07:00 PM    Specific gravity 1.013 08/05/2017 07:00 PM    pH (UA) 5.0 08/05/2017 07:00 PM    Protein 100 08/05/2017 07:00 PM    Glucose NEGATIVE  08/05/2017 07:00 PM    Ketone NEGATIVE  08/05/2017 07:00 PM    Bilirubin NEGATIVE  08/05/2017 07:00 PM    Urobilinogen 0.2 08/05/2017 07:00 PM    Nitrites NEGATIVE  08/05/2017 07:00 PM    Leukocyte Esterase NEGATIVE  08/05/2017 07:00 PM    Epithelial cells FEW 08/05/2017 07:00 PM    Bacteria NEGATIVE  08/05/2017 07:00 PM    WBC 0-4 08/05/2017 07:00 PM    RBC 0-5 08/05/2017 07:00 PM       MEDICATIONS:  Current Facility-Administered Medications   Medication Dose Route Frequency    hydrALAZINE (APRESOLINE) 20 mg/mL injection 10 mg  10 mg IntraVENous Q6H PRN    sodium chloride (NS) flush 5-10 mL  5-10 mL IntraVENous Q8H    loperamide (IMODIUM) capsule 4 mg  4 mg Oral TID PRN    enoxaparin (LOVENOX) injection 40 mg  40 mg SubCUTAneous Q24H    insulin lispro (HUMALOG) injection   SubCUTAneous AC&HS    glucose chewable tablet 16 g  4 Tab Oral PRN    glucagon (GLUCAGEN) injection 1 mg  1 mg IntraMUSCular PRN    0.9% sodium chloride infusion  75 mL/hr IntraVENous CONTINUOUS    amLODIPine (NORVASC) tablet 5 mg  5 mg Oral DAILY    carvedilol (COREG) tablet 25 mg  25 mg Oral BID WITH MEALS    doxazosin (CARDURA) tablet 4 mg  4 mg Oral DAILY    aspirin delayed-release tablet 81 mg  81 mg Oral DAILY    insulin glargine (LANTUS) injection 10 Units  10 Units SubCUTAneous QHS    dextrose 10 % infusion 125-250 mL  125-250 mL IntraVENous PRN    sodium chloride (NS) flush 5-10 mL  5-10 mL IntraVENous PRN    sodium chloride (NS) flush 5-10 mL  5-10 mL IntraVENous Q8H    acetaminophen (TYLENOL) tablet 650 mg  650 mg Oral Q6H PRN    oxyCODONE-acetaminophen (PERCOCET) 5-325 mg per tablet 1 Tab  1 Tab Oral Q6H PRN    naloxone (NARCAN) injection 0.4 mg  0.4 mg IntraVENous PRN    ondansetron (ZOFRAN) injection 4 mg  4 mg IntraVENous Q4H PRN    bisacodyl (DULCOLAX) suppository 10 mg  10 mg Rectal DAILY PRN

## 2017-08-07 NOTE — PROGRESS NOTES
[address] SHIFT NURSING NOTE    Bedside and Verbal shift change report given to [address] F (oncoming nurse) by Kenny Neri (offgoing nurse). Report included the following information SBAR, Kardex, Intake/Output, MAR, Recent Results, Med Rec Status and Cardiac Rhythm nsr. SHIFT SUMMARY:     9281: IV fluids stopped Pt transported to [facility]    1058: Pt back on floor IV fluids resumed. TRANSFER - OUT REPORT:    Verbal [facility] for routine progression of care       Report consisted of patients Situation, Background, Assessment and   Recommendations(SBAR). Information from the following report(s) SBAR, Kardex, Intake/Output, MAR, Accordion, Recent Results and Med Rec Status was reviewed with the receiving nurse. Lines:   Peripheral IV 07/21/17 Right Antecubital (Active)       Peripheral IV 07/21/17 Left Forearm (Active)       Peripheral IV 08/04/17 Left Antecubital (Active)   Site Assessment Clean, dry, & intact 8/7/2017  3:07 PM   Phlebitis Assessment 0 8/7/2017  3:07 PM   Infiltration Assessment 0 8/7/2017  3:07 PM   Dressing Status Clean, dry, & intact 8/7/2017  3:07 PM   Dressing Type Tape;Transparent 8/7/2017  3:07 PM   Hub Color/Line Status Pink; Infusing 8/7/2017  3:07 PM   Action Taken Blood drawn 8/4/2017  7:12 PM   Alcohol Cap Used No 8/7/2017  9:13 AM        Opportunity for questions and clarification was provided.

## 2017-08-07 NOTE — ANESTHESIA POSTPROCEDURE EVALUATION
Post-Anesthesia Evaluation and Assessment    Patient: Aleisha Mg MRN: [id number]  SSN: [id number]    YOB: 1965  Age: 46 y.o. Sex: male       Cardiovascular Function/Vital Signs  Visit Vitals    /85    Pulse 63    Temp 36.5 °C (97.7 °F)    Resp 14    Ht 5' 9\" (1.753 m)    Wt 98.4 kg (217 lb)    SpO2 100%    BMI 32.05 kg/m2       Patient is status post total IV anesthesia anesthesia for Procedure(s):  COLONOSCOPY  COLON BIOPSY. Nausea/Vomiting: None    Postoperative hydration reviewed and adequate. Pain:  Pain Scale 1: Numeric (0 - 10) (08/07/17 1010)  Pain Intensity 1: 0 (08/07/17 1010)   Managed    Neurological Status: At baseline    Mental Status and Level of Consciousness: Arousable    Pulmonary Status:   O2 Device: Room air (08/07/17 1010)   Adequate oxygenation and airway patent    Complications related to anesthesia: None    Post-anesthesia assessment completed.  No concerns    Signed By: Enriqueta Reid DO     August 7, 2017

## 2017-08-07 NOTE — H&P
Gastroenterology History and Physical    Patient: Pamela Hartley    Physician: Kevin Adorno MD    Vital Signs: Blood pressure (!) 156/92, pulse 64, temperature 98.2 °F (36.8 °C), resp. rate 15, height 5' 9\" (1.753 m), weight 98.4 kg (217 lb), SpO2 99 %. Allergies: Allergies   Allergen Reactions    Flexeril [Cyclobenzaprine] Nausea Only     Patient states he is not allergic    Gabapentin Unknown (comments)     Patient states he is not allergic       Indication: chronic diarrhea    History:  Past Medical History:   Diagnosis Date    Cardiomyopathy ([facility] 05/2012 2012 Echo: severe conc LVH. EF 25-30%. Cath : clean coronaries. [facility] hospitalization for Hypertensive Emergency     Diabetes ([facility]     Hypertension     Hypertensive emergency 05/2012    [facility]. 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, l1    Stage 3 chronic kidney disease 7/22/2017    Type I (juvenile type) diabetes mellitus without mention of complication, not stated as uncontrolled       Past Surgical History:   Procedure Laterality Date    HX APPENDECTOMY        Social History     Social History    Marital status:      Spouse name: N/A    Number of children: N/A    Years of education: N/A     Social History Main Topics    Smoking status: Former Smoker     Packs/day: 0.50     Quit date: 8/4/2002    Smokeless tobacco: Never Used    Alcohol use No      Comment: occ    Drug use: No    Sexual activity: Not Asked     Other Topics Concern    None     Social History Narrative      Family History   Problem Relation Age of Onset    Hypertension Mother     Hypertension Sister        Medications:   Prior to Admission medications    Medication Sig Start Date End Date Taking? Authorizing Provider   lisinopril (PRINIVIL, ZESTRIL) 40 mg tablet Take 0.5 Tabs by mouth daily.  7/25/17  Yes Chayito Oneill MD   amLODIPine (NORVASC) 5 mg tablet TAKE 1 TABLET EVERY DAY 7/19/17  Yes Chayito Oneill MD   LEVEMIR FLEXTOUCH 100 unit/mL (3 mL) inpn INJECT 20 UNITS SUBCUTANEOUSLY EVERY DAY 5/8/17  Yes Héctor Deutsch MD   carvedilol (COREG) 25 mg tablet TAKE 1 TABLET TWICE A DAY 10/19/16  Yes Héctor Deutsch MD   doxazosin (CARDURA) 4 mg tablet TAKE 1 TABLET BY MOUTH ONCE DAILY 9/30/16  Yes Héctor Deutsch MD   BD INSULIN PEN NEEDLE UF MINI 31 gauge x 3/16\" ndle USE AS DIRECTED EVERY DAY 6/20/16  Yes Héctor Deutsch MD   aspirin delayed-release 81 mg tablet TAKE 1 TABLET BY MOUTH DAILY WITH BREAKFAST 8/6/13  Yes Héctor Deutsch MD   potassium chloride SR (KLOR-CON 10) 10 mEq tablet TAKE 1 TABLET DAILY 10/19/16   Héctor Deutsch MD       Physical Exam:   HEENT: Head: Normocephalic, no lesions, without obvious abnormality.    Heart: regular rate and rhythm, S1, S2 normal, no murmur, click, rub or gallop   Lungs: chest clear, no wheezing, rales, normal symmetric air entry, Heart exam - S1, S2 normal, no murmur, no gallop, rate regular   Abdominal: Bowel sounds are normal, liver is not enlarged, spleen is not enlarged     Signed:  Basil Kinney MD 8/7/2017

## 2017-08-07 NOTE — ANESTHESIA PREPROCEDURE EVALUATION
Anesthetic History   No history of anesthetic complications            Review of Systems / Medical History  Patient summary reviewed, nursing notes reviewed and pertinent labs reviewed    Pulmonary  Within defined limits                 Neuro/Psych   Within defined limits           Cardiovascular    Hypertension                Comments: Echo this week showed normal EF >50% and only mild AI   GI/Hepatic/Renal         Renal disease: CRI       Endo/Other    Diabetes: type 1         Other Findings   Comments: Syncope and acute renal failure POA         Physical Exam    Airway  Mallampati: I  TM Distance: 4 - 6 cm  Neck ROM: normal range of motion   Mouth opening: Normal     Cardiovascular  Regular rate and rhythm,  S1 and S2 normal,  no murmur, click, rub, or gallop             Dental  No notable dental hx       Pulmonary  Breath sounds clear to auscultation               Abdominal  GI exam deferred       Other Findings            Anesthetic Plan    ASA: 3  Anesthesia type: MAC

## 2017-08-07 NOTE — ROUTINE PROCESS
TRANSFER - OUT REPORT:    Verbal report given to Nathan on Inga Su  being transferred to [facility] for routine progression of care       Report consisted of patients Situation, Background, Assessment and   Recommendations(SBAR). Information from the following report(s) Procedure Summary was reviewed with the receiving nurse. Opportunity for questions and clarification was provided.       Patient transported with:

## 2017-08-07 NOTE — PROGRESS NOTES
TRANSFER - IN REPORT:    Verbal report received from RICHIE RN(name) on Hali Bryson  being received from [facility](unit) for routine progression of care      Report consisted of patients Situation, Background, Assessment and   Recommendations(SBAR). Information from the following report(s) SBAR, Kardex and Intake/Output was reviewed with the receiving nurse. Opportunity for questions and clarification was provided. Assessment completed upon patients arrival to unit and care assumed.

## 2017-08-07 NOTE — PROGRESS NOTES
Hospitalist Progress Note    NAME: Carrington Connor   :  1965   MRN:  [id number]       Assessment / Plan:  Syncope POA   Acute on chronic renal failure POA with baseline CKD3  Recurrent due to dehydration with Persistent/Subacute diarrhea  Recent 2D echo with normal EF  BP better  Continue  Try lactulose free diet to see if it help  May need questran vs immodium depending on workup  Recent infectious workup negative at [facility] for colonoscopy today  Continue to monitor lytes  Nephrology also involved  Recent Renal ultrasound normal when admitted with similar presentation  fecal fat, ova and parasite pending     Type 2 diabetes mellitus   Continue reduced Lantus dose of lantus 10 unit for now  SSI     ? Prior Cardiomyopathy per H&P  Pt denies any history, I am not able to find records  Most recent left ventricular ejection fraction was 55% to 60%   Holding ACE inh due to renal failure     HTN  Holding ACE inh  Continue Norvasc, Cardura and Coreg  p.r.n. IV Hydralazine    Right knee pain, status post fall. X-ray is negative.   p.r.n. Tylenol and percocet     Body mass index is 32.05 kg/(m^2). Code status: Full  Prophylaxis: Lovenox  Recommended Disposition: Home w/Family     Subjective: Pt seen and examined at bedside. Continue to have diarrhea, prep last night. Overnight events d/w RN     Chief Complaint / Reason for Physician Visit: f/u \"syncope and persistent diarrhea\"    Review of Systems:  Symptom Y/N Comments  Symptom Y/N Comments   Fever/Chills n   Chest Pain n    Poor Appetite    Edema     Cough n   Abdominal Pain n    Sputum    Joint Pain     SOB/ANGULO    Pruritis/Rash     Nausea/vomit n   Tolerating PT/OT     Diarrhea y   Tolerating Diet y    Constipation    Other       Could NOT obtain due to:      Objective:     VITALS:   Last 24hrs VS reviewed since prior progress note.  Most recent are:  Patient Vitals for the past 24 hrs:   Temp Pulse Resp BP SpO2   17 0724 98.2 °F (36.8 °C) 64 16 156/86 98 %   08/07/17 0231 98 °F (36.7 °C) 68 16 162/72 99 %   08/06/17 2224 98 °F (36.7 °C) 74 20 142/90 100 %   08/06/17 2059 97.9 °F (36.6 °C) 64 - 144/68 100 %   08/06/17 1515 97.8 °F (36.6 °C) 66 20 140/72 99 %   08/06/17 1214 97.6 °F (36.4 °C) 68 20 146/78 100 %       Intake/Output Summary (Last 24 hours) at 08/07/17 0759  Last data filed at 08/06/17 1859   Gross per 24 hour   Intake           1552.5 ml   Output                0 ml   Net           1552.5 ml        PHYSICAL EXAM:  General: WD, WN. Alert, cooperative, no acute distress    EENT:  EOMI. Anicteric sclerae. MMM  Resp:  CTA bilaterally, no wheezing or rales. No accessory muscle use  CV:  Regular  rhythm,  No edema  GI:  Soft, Non distended, Non tender.  +Bowel sounds  Neurologic:  Alert and oriented X 3, normal speech,   Psych:   Good insight. Not anxious nor agitated  Skin:  No rashes. No jaundice    Reviewed most current lab test results and cultures  YES  Reviewed most current radiology test results   YES  Review and summation of old records today    NO  Reviewed patient's current orders and MAR    YES  PMH/SH reviewed - no change compared to H&P  ________________________________________________________________________  Care Plan discussed with:    Comments   Patient y    Family      RN y    Care Manager     Consultant                        Multidiciplinary team rounds were held today with , nursing, pharmacist and clinical coordinator. Patient's plan of care was discussed; medications were reviewed and discharge planning was addressed.      ________________________________________________________________________  Total NON critical care TIME:  25 Minutes    Total CRITICAL CARE TIME Spent:   Minutes non procedure based      Comments   >50% of visit spent in counseling and coordination of care     ________________________________________________________________________  Nathan Rosenthal MD     Procedures: see electronic medical records for all procedures/Xrays and details which were not copied into this note but were reviewed prior to creation of Plan. LABS:  I reviewed today's most current labs and imaging studies.   Pertinent labs include:  Recent Labs      08/07/17 0233 08/06/17   0402  08/05/17   0124   WBC  6.9  8.0  8.0   HGB  9.6*  10.2*  9.2*   HCT  29.4*  30.9*  28.5*   PLT  289  318  293     Recent Labs      08/07/17 0233 08/06/17   0402  08/05/17   0124   NA  143  140  140   K  3.7  3.6  4.0   CL  114*  110*  111*   CO2  23  24  23   GLU  75  89  103*   BUN  19  27*  29*   CREA  1.67*  2.02*  2.59*   CA  8.3*  8.3*  7.9*   MG  1.7  1.8   --    PHOS  2.6  2.9   --    ALB  2.6*  2.8*  2.6*   TBILI  0.4  0.5  0.5   SGOT  34  50*  49*   ALT  114*  137*  127*       Signed: Alejandra Escobedo MD
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
## 2017-08-07 NOTE — PERIOP NOTES
Anesthesia reports 280 mg Propofol, 40 mg Lidocaine and 450 mL NS given during procedure. Received report from anesthesia staff on vital signs and status of patient.

## 2017-08-07 NOTE — PROCEDURES
Colonoscopy Operative Report  Brittany Fischer  1965  [id number]      Procedure Type:   Colonoscopy with biopsy     Indications:     Diarrhea    Pre-operative Diagnosis: see indication above    Post-operative Diagnosis:  See findings below    : Kena Garcia MD    Referring Provider: Ernesto Esposito    Sedation:  MAC anesthesia Propofol    Pre-Procedural Exam:      Airway: clear, Malimpati 2   Heart: RRR, without gallops or rubs  Lungs: clear bilaterally without wheezes, crackles, or rhonchi  Abdomen: soft, nontender, nondistended, bowel sounds present     Procedure Details:  After informed consent was obtained with all risks and benefits of procedure explained and preoperative exam completed, the patient was taken to the endoscopy suite and placed in the left lateral decubitus position. Upon sequential sedation as per above, a digital rectal exam was performed. The Olympus videocolonoscope [id number] was inserted in the rectum and carefully advanced to the cecum, which was identified by the cecal strap. The quality of preparation was inadequate. The colonoscope was slowly withdrawn with careful evaluation between folds. Retoflexion in the rectum was completed. Findings/Impression: ANUS: Anal exam reveals no masses or hemorrhoids, sphincter tone is normal.   RECTUM: Rectal exam reveals no masses or hemorrhoids. SIGMOID COLON: The mucosa is normal with good vascular pattern and without ulcers, diverticula, and polyps. DESCENDING COLON: The mucosa is normal with good vascular pattern and without ulcers, diverticula, and polyps. SPLENIC FLEXURE: The splenic flexure is normal.   TRANSVERSE COLON: The mucosa is normal with good vascular pattern and without ulcers, diverticula, and polyps. HEPATIC FLEXURE: The hepatic flexure is normal.   ASCENDING COLON: The mucosa is normal with good vascular pattern and without ulcers, diverticula, and polyps.    CECUM: The appendiceal orifice appears normal. The ileocecal valve appears normal.   TERMINAL ILEUM: The terminal ileum was not entered. Specimen Removed:  Random colon bx to r/o microscopic colitis    Complications: None. EBL:  None. Recommendations: --Await pathology. , -For colon cancer screening in this average-risk patient, colonoscopy may be repeated in 10 years. Regular diet. Resume normal medication(s). You may be asked to call your doctor's office for the results.      Discharge Disposition:

## 2017-08-07 NOTE — ROUTINE PROCESS
Jules So  1965  [id number]    Situation:  Verbal report received from: Tanmay Yarbrough RN  Procedure: Procedure(s) with comments:  COLONOSCOPY - colon with bx  COLON BIOPSY    Background:    Preoperative diagnosis: diarrhea  Postoperative diagnosis: diarrhea    :  Dr. Dana Brock  Assistant(s): Endoscopy Technician-1: Chidi Oswald  Endoscopy RN-1: March Kennel    Specimens:   ID Type Source Tests Collected by Time Destination   1 : bx Preservative   Heide Parrish MD 8/7/2017 0945 Pathology     H. Pylori  no    Assessment:  Intra-procedure medications       Anesthesia gave intra-procedure sedation and medications, see anesthesia flow sheet yes    Intravenous fluids: NS@ KVO     Vital signs stable         Abdominal assessment: round and soft         Recommendation:  Discharge patient per MD order.   Return to floor Yes  Family or Friend none that he knows of   Permission to share finding with family or friend yes

## 2017-08-07 NOTE — PERIOP NOTES
Endoscope was pre-cleaned at the bedside immediately following procedure by [facility].

## 2017-08-07 NOTE — PROGRESS NOTES
Transported back to pt room per  [employer] per [facility] Transporter in stable condition.

## 2017-08-07 NOTE — PROGRESS NOTES
Re-admit within 30 days    Patient has been admitted due to syncope, dehydration and acute renal failure. He was admitted on 07/22/2017 to 07/25/2017 at [facility] because of acute kidney injury and diarrhea. Patient reports complete independence and is employed. W/U is in progress and  patient has undergone a colonoscopy with biopsy today. Will follow hospital course and continue to assess for any discharge needs. Care Management Interventions  PCP Verified by CM: Yes  MyChart Signup: No  Discharge Durable Medical Equipment: No  Physical Therapy Consult: No  Occupational Therapy Consult: No  Speech Therapy Consult: No  Current Support Network: Lives with Spouse  Confirm Follow Up Transport: Family  Plan discussed with Pt/Family/Caregiver: Yes  Freedom of Choice Offered:  Yes

## 2017-08-08 ENCOUNTER — APPOINTMENT (OUTPATIENT)
Dept: CT IMAGING | Age: 52
DRG: 312 | End: 2017-08-08
Attending: INTERNAL MEDICINE
Payer: COMMERCIAL

## 2017-08-08 LAB
ANION GAP BLD CALC-SCNC: 5 MMOL/L (ref 5–15)
BASOPHILS # BLD AUTO: 0.1 K/UL (ref 0–0.1)
BASOPHILS # BLD: 1 % (ref 0–1)
BUN SERPL-MCNC: 18 MG/DL (ref 6–20)
BUN/CREAT SERPL: 11 (ref 12–20)
CALCIUM SERPL-MCNC: 8.3 MG/DL (ref 8.5–10.1)
CHLORIDE SERPL-SCNC: 113 MMOL/L (ref 97–108)
CO2 SERPL-SCNC: 24 MMOL/L (ref 21–32)
CREAT SERPL-MCNC: 1.65 MG/DL (ref 0.7–1.3)
DIFFERENTIAL METHOD BLD: ABNORMAL
EOSINOPHIL # BLD: 0.5 K/UL (ref 0–0.4)
EOSINOPHIL NFR BLD: 6 % (ref 0–7)
ERYTHROCYTE [DISTWIDTH] IN BLOOD BY AUTOMATED COUNT: 16.3 % (ref 11.5–14.5)
FAT STL QL: NORMAL
GLUCOSE BLD STRIP.AUTO-MCNC: 108 MG/DL (ref 65–100)
GLUCOSE BLD STRIP.AUTO-MCNC: 120 MG/DL (ref 65–100)
GLUCOSE BLD STRIP.AUTO-MCNC: 138 MG/DL (ref 65–100)
GLUCOSE BLD STRIP.AUTO-MCNC: 484 MG/DL (ref 65–100)
GLUCOSE BLD STRIP.AUTO-MCNC: 78 MG/DL (ref 65–100)
GLUCOSE BLD STRIP.AUTO-MCNC: 79 MG/DL (ref 65–100)
GLUCOSE BLD STRIP.AUTO-MCNC: 88 MG/DL (ref 65–100)
GLUCOSE SERPL-MCNC: 92 MG/DL (ref 65–100)
HCT VFR BLD AUTO: 29.7 % (ref 36.6–50.3)
HGB BLD-MCNC: 9.6 G/DL (ref 12.1–17)
LYMPHOCYTES # BLD AUTO: 38 % (ref 12–49)
LYMPHOCYTES # BLD: 3.1 K/UL (ref 0.8–3.5)
MCH RBC QN AUTO: 25.7 PG (ref 26–34)
MCHC RBC AUTO-ENTMCNC: 32.3 G/DL (ref 30–36.5)
MCV RBC AUTO: 79.4 FL (ref 80–99)
MONOCYTES # BLD: 0.2 K/UL (ref 0–1)
MONOCYTES NFR BLD AUTO: 3 % (ref 5–13)
NEUTRAL FAT STL QL: NORMAL
NEUTS SEG # BLD: 4.2 K/UL (ref 1.8–8)
NEUTS SEG NFR BLD AUTO: 52 % (ref 32–75)
O+P SPEC MICRO: NORMAL
O+P STL CONC: NORMAL
PLATELET # BLD AUTO: 306 K/UL (ref 150–400)
POTASSIUM SERPL-SCNC: 3.9 MMOL/L (ref 3.5–5.1)
RBC # BLD AUTO: 3.74 M/UL (ref 4.1–5.7)
RBC MORPH BLD: ABNORMAL
SERVICE CMNT-IMP: ABNORMAL
SERVICE CMNT-IMP: NORMAL
SODIUM SERPL-SCNC: 142 MMOL/L (ref 136–145)
SPECIMEN SOURCE: NORMAL
WBC # BLD AUTO: 8.1 K/UL (ref 4.1–11.1)
WBC MORPH BLD: ABNORMAL

## 2017-08-08 PROCEDURE — 74011250636 HC RX REV CODE- 250/636: Performed by: INTERNAL MEDICINE

## 2017-08-08 PROCEDURE — 82962 GLUCOSE BLOOD TEST: CPT

## 2017-08-08 PROCEDURE — 80048 BASIC METABOLIC PNL TOTAL CA: CPT | Performed by: INTERNAL MEDICINE

## 2017-08-08 PROCEDURE — 74011250637 HC RX REV CODE- 250/637: Performed by: INTERNAL MEDICINE

## 2017-08-08 PROCEDURE — 74011250637 HC RX REV CODE- 250/637: Performed by: HOSPITALIST

## 2017-08-08 PROCEDURE — 77030011256 HC DRSG MEPILEX <16IN NO BORD MOLN -A

## 2017-08-08 PROCEDURE — 74176 CT ABD & PELVIS W/O CONTRAST: CPT

## 2017-08-08 PROCEDURE — 74011250636 HC RX REV CODE- 250/636: Performed by: HOSPITALIST

## 2017-08-08 PROCEDURE — 74011636637 HC RX REV CODE- 636/637: Performed by: INTERNAL MEDICINE

## 2017-08-08 PROCEDURE — 74011000250 HC RX REV CODE- 250: Performed by: INTERNAL MEDICINE

## 2017-08-08 PROCEDURE — 85025 COMPLETE CBC W/AUTO DIFF WBC: CPT | Performed by: INTERNAL MEDICINE

## 2017-08-08 PROCEDURE — 65270000015 HC RM PRIVATE ONCOLOGY

## 2017-08-08 PROCEDURE — 36415 COLL VENOUS BLD VENIPUNCTURE: CPT | Performed by: INTERNAL MEDICINE

## 2017-08-08 RX ORDER — CARVEDILOL 3.12 MG/1
6.25 TABLET ORAL 2 TIMES DAILY WITH MEALS
Status: DISCONTINUED | OUTPATIENT
Start: 2017-08-08 | End: 2017-08-10 | Stop reason: HOSPADM

## 2017-08-08 RX ORDER — SODIUM CHLORIDE 9 MG/ML
1000 INJECTION, SOLUTION INTRAVENOUS ONCE
Status: COMPLETED | OUTPATIENT
Start: 2017-08-08 | End: 2017-08-08

## 2017-08-08 RX ADMIN — OXYCODONE HYDROCHLORIDE AND ACETAMINOPHEN 1 TABLET: 5; 325 TABLET ORAL at 22:32

## 2017-08-08 RX ADMIN — Medication 16 G: at 09:52

## 2017-08-08 RX ADMIN — SILVER SULFADIAZINE: 10 CREAM TOPICAL at 12:59

## 2017-08-08 RX ADMIN — CARVEDILOL 25 MG: 12.5 TABLET, FILM COATED ORAL at 08:32

## 2017-08-08 RX ADMIN — Medication 10 ML: at 22:37

## 2017-08-08 RX ADMIN — SODIUM CHLORIDE 1000 ML: 900 INJECTION, SOLUTION INTRAVENOUS at 13:00

## 2017-08-08 RX ADMIN — Medication 10 ML: at 13:04

## 2017-08-08 RX ADMIN — SODIUM CHLORIDE 125 ML/HR: 900 INJECTION, SOLUTION INTRAVENOUS at 16:02

## 2017-08-08 RX ADMIN — OXYCODONE HYDROCHLORIDE AND ACETAMINOPHEN 1 TABLET: 5; 325 TABLET ORAL at 08:30

## 2017-08-08 RX ADMIN — CARVEDILOL 6.25 MG: 3.12 TABLET, FILM COATED ORAL at 18:00

## 2017-08-08 RX ADMIN — OXYCODONE HYDROCHLORIDE AND ACETAMINOPHEN 1 TABLET: 5; 325 TABLET ORAL at 16:01

## 2017-08-08 RX ADMIN — INSULIN GLARGINE 10 UNITS: 100 INJECTION, SOLUTION SUBCUTANEOUS at 22:37

## 2017-08-08 RX ADMIN — DOXAZOSIN 4 MG: 2 TABLET ORAL at 08:32

## 2017-08-08 RX ADMIN — ASPIRIN 81 MG: 81 TABLET, COATED ORAL at 08:32

## 2017-08-08 RX ADMIN — ENOXAPARIN SODIUM 40 MG: 40 INJECTION SUBCUTANEOUS at 08:36

## 2017-08-08 RX ADMIN — AMLODIPINE BESYLATE 5 MG: 5 TABLET ORAL at 08:32

## 2017-08-08 NOTE — PROGRESS NOTES
Hypoglycemic note-    Pt having episode of dizziness BP checked, BG checked    BG @ 0916 - 79    4 oz orange juice given per protocol    BG @ 0940 - 78    16 grams of glucose tabs given per protocol    BG @ 1051  484 nurse recheck and  @ 1053    MD notified

## 2017-08-08 NOTE — PROGRESS NOTES
Oncology Nursing Communication Tool      7:36 AM  8/8/2017     Bedside shift change report given to Haja Pepper RN (incoming nurse) by Natasha Regan RN (outgoing nurse) on Miroslava Taylor a 46 y.o. male who was admitted on 8/4/2017  6:41 PM. Report included the following information SBAR, Kardex, [facility] and [employer]. Significant changes during shift: none      Issues for physician to address: discharge planning          Code Status: Full Code     Infections: No current active infections     Allergies: Flexeril [cyclobenzaprine] and Gabapentin     Current diet: DIET DIABETIC CONSISTENT CARB Regular       Pain Controlled [x] yes [] no   Bowel Movement [] yes [x] no   Last Bowel Movement (date)     8/7/17            Vital Signs:   Patient Vitals for the past 12 hrs:   Temp Pulse Resp BP SpO2   08/07/17 2302 98.7 °F (37.1 °C) 63 14 154/73 99 %   08/07/17 2025 98.4 °F (36.9 °C) 70 18 154/82 100 %      Intake & Output:     Intake/Output Summary (Last 24 hours) at 08/08/17 0736  Last data filed at 08/07/17 2025   Gross per 24 hour   Intake           1462.5 ml   Output                0 ml   Net           1462.5 ml      Laboratory Results:     Recent Results (from the past 12 hour(s))   GLUCOSE, POC    Collection Time: 08/07/17  9:14 PM   Result Value Ref Range    Glucose (POC) 101 (H) 65 - 100 mg/dL    Performed by Bernardino Crenshaw (PCT)    CBC WITH AUTOMATED DIFF    Collection Time: 08/08/17  4:11 AM   Result Value Ref Range    WBC 8.1 4.1 - 11.1 K/uL    RBC 3.74 (L) 4.10 - 5.70 M/uL    HGB 9.6 (L) 12.1 - 17.0 g/dL    HCT 29.7 (L) 36.6 - 50.3 %    MCV 79.4 (L) 80.0 - 99.0 FL    MCH 25.7 (L) 26.0 - 34.0 PG    MCHC 32.3 30.0 - 36.5 g/dL    RDW 16.3 (H) 11.5 - 14.5 %    PLATELET 168 711 - 990 K/uL    NEUTROPHILS 52 32 - 75 %    LYMPHOCYTES 38 12 - 49 %    MONOCYTES 3 (L) 5 - 13 %    EOSINOPHILS 6 0 - 7 %    BASOPHILS 1 0 - 1 %    ABS. NEUTROPHILS 4.2 1.8 - 8.0 K/UL    ABS.  LYMPHOCYTES 3.1 0.8 - 3.5 K/UL    ABS. MONOCYTES 0.2 0.0 - 1.0 K/UL    ABS. EOSINOPHILS 0.5 (H) 0.0 - 0.4 K/UL    ABS. BASOPHILS 0.1 0.0 - 0.1 K/UL    RBC COMMENTS NORMOCYTIC, NORMOCHROMIC      WBC COMMENTS REACTIVE LYMPHS      DF SMEAR SCANNED     METABOLIC PANEL, BASIC    Collection Time: 08/08/17  4:11 AM   Result Value Ref Range    Sodium 142 136 - 145 mmol/L    Potassium 3.9 3.5 - 5.1 mmol/L    Chloride 113 (H) 97 - 108 mmol/L    CO2 24 21 - 32 mmol/L    Anion gap 5 5 - 15 mmol/L    Glucose 92 65 - 100 mg/dL    BUN 18 6 - 20 MG/DL    Creatinine 1.65 (H) 0.70 - 1.30 MG/DL    BUN/Creatinine ratio 11 (L) 12 - 20      GFR est AA 53 (L) >60 ml/min/1.73m2    GFR est non-AA 44 (L) >60 ml/min/1.73m2    Calcium 8.3 (L) 8.5 - 10.1 MG/DL              Opportunity for questions and clarifications were given to the incoming nurse. Patient's bed is in low position, side rails x2, door open PRN, call bell within reach and patient not in distress.       Rm Tran RN

## 2017-08-08 NOTE — PROGRESS NOTES
General Daily Progress Note    Admit Date: 8/4/2017  Hospital day 8/8/2017    Subjective:diarrhea last night  O- Colon bx pending. Y  N  [] [x]  Abd Pain:    [] [x]  Nausea:  [] [x] Vomiting:  [x] []  Diarrhea:  [] [x]  Constipation:  [] [x]  Melena:  [] [x]  Hematochezia:  [x] []  Tolerating Diet:    Current Facility-Administered Medications   Medication Dose Route Frequency    carvedilol (COREG) tablet 6.25 mg  6.25 mg Oral BID WITH MEALS    hydrALAZINE (APRESOLINE) 20 mg/mL injection 10 mg  10 mg IntraVENous Q6H PRN    loperamide (IMODIUM) capsule 4 mg  4 mg Oral TID PRN    silver sulfADIAZINE (SILVADENE) 1 % topical cream   Topical DAILY    enoxaparin (LOVENOX) injection 40 mg  40 mg SubCUTAneous Q24H    insulin lispro (HUMALOG) injection   SubCUTAneous AC&HS    glucose chewable tablet 16 g  4 Tab Oral PRN    glucagon (GLUCAGEN) injection 1 mg  1 mg IntraMUSCular PRN    0.9% sodium chloride infusion  125 mL/hr IntraVENous CONTINUOUS    aspirin delayed-release tablet 81 mg  81 mg Oral DAILY    insulin glargine (LANTUS) injection 10 Units  10 Units SubCUTAneous QHS    dextrose 10 % infusion 125-250 mL  125-250 mL IntraVENous PRN    sodium chloride (NS) flush 5-10 mL  5-10 mL IntraVENous PRN    sodium chloride (NS) flush 5-10 mL  5-10 mL IntraVENous Q8H    acetaminophen (TYLENOL) tablet 650 mg  650 mg Oral Q6H PRN    oxyCODONE-acetaminophen (PERCOCET) 5-325 mg per tablet 1 Tab  1 Tab Oral Q6H PRN    naloxone (NARCAN) injection 0.4 mg  0.4 mg IntraVENous PRN    ondansetron (ZOFRAN) injection 4 mg  4 mg IntraVENous Q4H PRN    bisacodyl (DULCOLAX) suppository 10 mg  10 mg Rectal DAILY PRN        Review of Systems  Pertinent items are noted in HPI.     Objective:     Patient Vitals for the past 8 hrs:   BP Temp Pulse Resp SpO2   08/08/17 1225 118/74 - - - -   08/08/17 1224 148/76 - - - -   08/08/17 1223 159/84 - - - -   08/08/17 0912 128/73 - 68 - 99 %   08/08/17 0742 193/81 99.1 °F (37.3 °C) 63 16 99 %        08/06 1901 - 08/08 0700  In: 1462.5 [P.O.:370; I.V.:1092.5]  Out: -     Physical Exam:   Visit Vitals    /74 (BP 1 Location: Right arm, BP Patient Position: Standing)    Pulse 68    Temp 99.1 °F (37.3 °C)    Resp 16    Ht 5' 9\" (1.753 m)    Wt 98.4 kg (217 lb)    SpO2 99%    BMI 32.05 kg/m2     General appearance: alert, cooperative, no distress, appears stated age  Abdomen: soft, non-tender. Bowel sounds normal. No masses,  no organomegaly      Data Review   Recent Results (from the past 24 hour(s))   GLUCOSE, POC    Collection Time: 08/07/17  4:43 PM   Result Value Ref Range    Glucose (POC) 88 65 - 100 mg/dL    Performed by [id number] [facility] [employer], POC    Collection Time: 08/07/17  9:14 PM   Result Value Ref Range    Glucose (POC) 101 (H) 65 - 100 mg/dL    Performed by Kandi Cabrera (PCT)    CBC WITH AUTOMATED DIFF    Collection Time: 08/08/17  4:11 AM   Result Value Ref Range    WBC 8.1 4.1 - 11.1 K/uL    RBC 3.74 (L) 4.10 - 5.70 M/uL    HGB 9.6 (L) 12.1 - 17.0 g/dL    HCT 29.7 (L) 36.6 - 50.3 %    MCV 79.4 (L) 80.0 - 99.0 FL    MCH 25.7 (L) 26.0 - 34.0 PG    MCHC 32.3 30.0 - 36.5 g/dL    RDW 16.3 (H) 11.5 - 14.5 %    PLATELET 017 240 - 754 K/uL    NEUTROPHILS 52 32 - 75 %    LYMPHOCYTES 38 12 - 49 %    MONOCYTES 3 (L) 5 - 13 %    EOSINOPHILS 6 0 - 7 %    BASOPHILS 1 0 - 1 %    ABS. NEUTROPHILS 4.2 1.8 - 8.0 K/UL    ABS. LYMPHOCYTES 3.1 0.8 - 3.5 K/UL    ABS. MONOCYTES 0.2 0.0 - 1.0 K/UL    ABS. EOSINOPHILS 0.5 (H) 0.0 - 0.4 K/UL    ABS.  BASOPHILS 0.1 0.0 - 0.1 K/UL    RBC COMMENTS NORMOCYTIC, NORMOCHROMIC      WBC COMMENTS REACTIVE LYMPHS      DF SMEAR SCANNED     METABOLIC PANEL, BASIC    Collection Time: 08/08/17  4:11 AM   Result Value Ref Range    Sodium 142 136 - 145 mmol/L    Potassium 3.9 3.5 - 5.1 mmol/L    Chloride 113 (H) 97 - 108 mmol/L    CO2 24 21 - 32 mmol/L    Anion gap 5 5 - 15 mmol/L    Glucose 92 65 - 100 mg/dL    BUN 18 6 - 20 MG/DL    Creatinine 1.65 (H) 0.70 - 1.30 MG/DL    BUN/Creatinine ratio 11 (L) 12 - 20      GFR est AA 53 (L) >60 ml/min/1.73m2    GFR est non-AA 44 (L) >60 ml/min/1.73m2    Calcium 8.3 (L) 8.5 - 10.1 MG/DL   GLUCOSE, POC    Collection Time: 08/08/17  7:51 AM   Result Value Ref Range    Glucose (POC) 88 65 - 100 mg/dL    Performed by Major , POC    Collection Time: 08/08/17  9:16 AM   Result Value Ref Range    Glucose (POC) 79 65 - 100 mg/dL    Performed by Major , POC    Collection Time: 08/08/17  9:40 AM   Result Value Ref Range    Glucose (POC) 78 65 - 100 mg/dL    Performed by Major , POC    Collection Time: 08/08/17 10:51 AM   Result Value Ref Range    Glucose (POC) 484 (H) 65 - 100 mg/dL    Performed by Roma , POC    Collection Time: 08/08/17 10:53 AM   Result Value Ref Range    Glucose (POC) 138 (H) 65 - 100 mg/dL    Performed by Brenda Ibarra          Assessment:     Active Problems:    Syncope and collapse (8/4/2017)        Plan:     CT abd/pelvis. PRN Imodium,await colon bx.   D/W pt and hospitalist

## 2017-08-08 NOTE — PROGRESS NOTES
Orthostatic Hypotension Tension testing    1223- BP: 159/84  Supine    1224- BP: 148/76  Sitting    1225- BP: 118/74  Standing    Pt was asymptomatic with no complaints of lightheadedness or dizziness during and after positional changes.

## 2017-08-08 NOTE — DIABETES MGMT
DTC Progress Note    Recommendations/ Comments: Please consider decreasing lantus to 8units Qhs.    Chart reviewed on Fede Cheatham. Patient is a 46 y.o. male with known Type 2 Diabetes on levemir 20units daily at home. A1c:   Lab Results   Component Value Date/Time    Hemoglobin A1c 6.5 07/23/2017 03:53 AM    Hemoglobin A1c 8.3 01/22/2013 02:08 PM       Recent Glucose Results: Lab Results   Component Value Date/Time    GLU 92 08/08/2017 04:11 AM    GLUCPOC 120 (H) 08/08/2017 03:55 PM    GLUCPOC 138 (H) 08/08/2017 10:53 AM    GLUCPOC 484 (H) 08/08/2017 10:51 AM        Lab Results   Component Value Date/Time    Creatinine 1.65 08/08/2017 04:11 AM     Estimated Creatinine Clearance: 59.9 mL/min (based on Cr of 1.65). Active Orders   Diet    DIET DIABETIC CONSISTENT CARB Regular        PO intake: Patient Vitals for the past 72 hrs:   % Diet Eaten   08/07/17 1530 100 %       Current hospital DM medication: lantus 10units Qhs and humalog correction    Will continue to follow as needed.     Thank you  ROD DelgadilloN, RN, [address]

## 2017-08-08 NOTE — PROGRESS NOTES
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 Likely at baseline. Subjective:     Chief Complaint:  \" I feel OK. \"  No dyspnea. No N/V. No HA. Review of Systems:    Symptom Y/N Comments  Symptom Y/N Comments   Fever/Chills    Chest Pain     Poor Appetite    Edema     Cough    Abdominal Pain     Sputum    Joint Pain     SOB/ANGULO    Pruritis/Rash     Nausea/vomit    Tolerating PT/OT     Diarrhea    Tolerating Diet     Constipation    Other       Could not obtain due to:      Objective:     VITALS:   Last 24hrs VS reviewed since prior progress note.  Most recent are:  Visit Vitals    /81 (BP 1 Location: Right arm, BP Patient Position: At rest)    Pulse 63    Temp 99.1 °F (37.3 °C)    Resp 16    Ht 5' 9\" (1.753 m)    Wt 98.4 kg (217 lb)    SpO2 99%    BMI 32.05 kg/m2       Intake/Output Summary (Last 24 hours) at 17 0820  Last data filed at 17   Gross per 24 hour   Intake           1462.5 ml   Output                0 ml   Net           1462.5 ml      Telemetry Reviewed:     PHYSICAL EXAM:  General: NAD  Few rhonchi  Abd soft        Lab Data Reviewed: (see below)    Medications Reviewed: (see below)    PMH/SH reviewed - no change compared to H&P  ________________________________________________________________________  Care Plan discussed with:  Patient     Family      RN     Care Manager                    Consultant:          Comments   >50% of visit spent in counseling and coordination of care       ________________________________________________________________________  Jacqueline Peraza MD     Procedures: see electronic medical records for all procedures/Xrays and details which  were not copied into this note but were reviewed prior to creation of Plan. LABS:  Recent Labs      08/08/17   0411  08/07/17   0233   WBC  8.1  6.9   HGB  9.6*  9.6*   HCT  29.7*  29.4*   PLT  306  289     Recent Labs      08/08/17   0411  08/07/17   0233  08/06/17   0402   NA  142  143  140   K  3.9  3.7  3.6   CL  113*  114*  110*   CO2  24  23  24   BUN  18  19  27*   CREA  1.65*  1.67*  2.02*   GLU  92  75  89   CA  8.3*  8.3*  8.3*   MG   --   1.7  1.8   PHOS   --   2.6  2.9     Recent Labs      08/07/17   0233  08/06/17   0402   SGOT  34  50*   AP  100  112   TP  6.8  7.4   ALB  2.6*  2.8*   GLOB  4.2*  4.6*     No results for input(s): INR, PTP, APTT in the last 72 hours. No lab exists for component: INREXT, INREXT   No results for input(s): FE, TIBC, PSAT, FERR in the last 72 hours. No results found for: FOL, RBCF   No results for input(s): PH, PCO2, PO2 in the last 72 hours. No results for input(s): CPK, CKMB in the last 72 hours.     No lab exists for component: TROPONINI  No components found for: [place]  Lab Results   Component Value Date/Time    Color YELLOW/STRAW 08/05/2017 07:00 PM    Appearance CLOUDY 08/05/2017 07:00 PM    Specific gravity 1.013 08/05/2017 07:00 PM    pH (UA) 5.0 08/05/2017 07:00 PM    Protein 100 08/05/2017 07:00 PM    Glucose NEGATIVE  08/05/2017 07:00 PM    Ketone NEGATIVE  08/05/2017 07:00 PM    Bilirubin NEGATIVE  08/05/2017 07:00 PM    Urobilinogen 0.2 08/05/2017 07:00 PM    Nitrites NEGATIVE  08/05/2017 07:00 PM    Leukocyte Esterase NEGATIVE  08/05/2017 07:00 PM    Epithelial cells FEW 08/05/2017 07:00 PM    Bacteria NEGATIVE  08/05/2017 07:00 PM    WBC 0-4 08/05/2017 07:00 PM    RBC 0-5 08/05/2017 07:00 PM       MEDICATIONS:  Current Facility-Administered Medications   Medication Dose Route Frequency    hydrALAZINE (APRESOLINE) 20 mg/mL injection 10 mg  10 mg IntraVENous Q6H PRN    loperamide (IMODIUM) capsule 4 mg  4 mg Oral TID PRN    silver sulfADIAZINE (SILVADENE) 1 % topical cream   Topical DAILY    enoxaparin (LOVENOX) injection 40 mg  40 mg SubCUTAneous Q24H    insulin lispro (HUMALOG) injection   SubCUTAneous AC&HS    glucose chewable tablet 16 g  4 Tab Oral PRN    glucagon (GLUCAGEN) injection 1 mg  1 mg IntraMUSCular PRN    0.9% sodium chloride infusion  50 mL/hr IntraVENous CONTINUOUS    amLODIPine (NORVASC) tablet 5 mg  5 mg Oral DAILY    carvedilol (COREG) tablet 25 mg  25 mg Oral BID WITH MEALS    doxazosin (CARDURA) tablet 4 mg  4 mg Oral DAILY    aspirin delayed-release tablet 81 mg  81 mg Oral DAILY    insulin glargine (LANTUS) injection 10 Units  10 Units SubCUTAneous QHS    dextrose 10 % infusion 125-250 mL  125-250 mL IntraVENous PRN    sodium chloride (NS) flush 5-10 mL  5-10 mL IntraVENous PRN    sodium chloride (NS) flush 5-10 mL  5-10 mL IntraVENous Q8H    acetaminophen (TYLENOL) tablet 650 mg  650 mg Oral Q6H PRN    oxyCODONE-acetaminophen (PERCOCET) 5-325 mg per tablet 1 Tab  1 Tab Oral Q6H PRN    naloxone (NARCAN) injection 0.4 mg  0.4 mg IntraVENous PRN    ondansetron (ZOFRAN) injection 4 mg  4 mg IntraVENous Q4H PRN    bisacodyl (DULCOLAX) suppository 10 mg  10 mg Rectal DAILY PRN

## 2017-08-08 NOTE — PROGRESS NOTES
Hospitalist Progress Note    NAME: Olga Bowser   :  1965   MRN:  [id number]       Assessment / Plan:  Syncope due to orthostatic hypotension in settings of dehydration   HTN with orthostatic hypotension   Holding ACE inh, stop Norvasc and Cardura for now ( had dose this am already)   Lowering dose of coreg  Aggressive IVF   Closely monitor orthostatic changes     Persistent/Subacute diarrhea  On lactose free diet  Infection w/u so far negative including c.diff/crypto/giardia/ova&paracytes    S/p colonoscopy , biopsy pending   Fecal fat pending   GI help was greatly appreciated     Acute renal failure POA on CKD stage III  Recurrent due to dehydration in settings of ongoing diarrhea   Baseline cr is likely 1.6-2.3, admission 3.00 --> 1.6. On IVF for orthostatic hypotension   Off ACE for elevated cr and + orthostatics   Nephrology help appreciated   Monitor closely. Avoid nephrotoxic drugs, adjust all meds to GFR. US : Medical renal disease. No hydronephrosis     Type 2 diabetes mellitus   BS was low yesterday at 80th   Continue reduced Lantus dose of lantus 10 unit for now  SSI     Right knee pain, status post fall. X-ray is negative.   p.r.n. Tylenol and percocet    ? Prior Cardiomyopathy per H&P  Pt denies any history. No details in record. Last EF 55%  Closely monitoring fluid status     Body mass index is 32.05 kg/(m^2).         Code status: Full  Prophylaxis: Lovenox     Baseline: , independent   Recommended Disposition: Home w/Family once diarrhea better / orthostatics resolve and ok with GI - hopefully 1-3 days      Subjective:      Chief Complaint / Reason for Physician Visit: following diarrhea / orthostatics  C/o feeling lightheadedness intermittent  + orthostatics  159/84 --> 118/74   Diarrhea: had one BM last night     Overnight events d/w RN    Review of Systems:  Symptom Y/N Comments  Symptom Y/N Comments   Fever/Chills n   Chest Pain n    Poor Appetite    Edema Cough n   Abdominal Pain n    Sputum    Joint Pain     SOB/ANGULO    Pruritis/Rash     Nausea/vomit n   Tolerating PT/OT  Independent    Diarrhea y   Tolerating Diet y    Constipation    Other       Could NOT obtain due to:      Objective:     VITALS:   Last 24hrs VS reviewed since prior progress note. Most recent are:  Patient Vitals for the past 24 hrs:   Temp Pulse Resp BP SpO2   08/08/17 1225 - - - 118/74 -   08/08/17 1224 - - - 148/76 -   08/08/17 1223 - - - 159/84 -   08/08/17 0912 - 68 - 128/73 99 %   08/08/17 0742 99.1 °F (37.3 °C) 63 16 193/81 99 %   08/07/17 2302 98.7 °F (37.1 °C) 63 14 154/73 99 %   08/07/17 2025 98.4 °F (36.9 °C) 70 18 154/82 100 %   08/07/17 1519 98.3 °F (36.8 °C) 62 16 143/83 100 %       Intake/Output Summary (Last 24 hours) at 08/08/17 1248  Last data filed at 08/07/17 2025   Gross per 24 hour   Intake            892.5 ml   Output                0 ml   Net            892.5 ml        PHYSICAL EXAM:  General: WD, WN. Alert, cooperative, no acute distress    EENT:  EOMI. Anicteric sclerae. MMM  Resp:  CTA bilaterally, no wheezing or rales. No accessory muscle use  CV:  Regular  rhythm,  No edema  GI:  Soft, Non distended, Non tender.  +Bowel sounds  Neurologic:  Alert and oriented X 3, normal speech,   Psych:   Good insight. Not anxious nor agitated  Skin:  No rashes. No jaundice    Reviewed most current lab test results and cultures  YES  Reviewed most current radiology test results   YES  Review and summation of old records today    NO  Reviewed patient's current orders and MAR    YES  PMH/SH reviewed - no change compared to H&P  ________________________________________________________________________  Care Plan discussed with:    Comments   Patient y    Family      RN y    Care Manager     Consultant  y TOM Farooq [facility]k team rounds were held today with , nursing, pharmacist and clinical coordinator.   Patient's plan of care was discussed; medications were reviewed and discharge planning was addressed. ________________________________________________________________________  Total NON critical care TIME:  35 Minutes    Total CRITICAL CARE TIME Spent:   Minutes non procedure based      Comments   >50% of visit spent in counseling and coordination of care     ________________________________________________________________________  Namrata Dexter MD     Procedures: see electronic medical records for all procedures/Xrays and details which were not copied into this note but were reviewed prior to creation of Plan. LABS:  I reviewed today's most current labs and imaging studies.   Pertinent labs include:  Recent Labs      08/08/17   0411  08/07/17   0233  08/06/17   0402   WBC  8.1  6.9  8.0   HGB  9.6*  9.6*  10.2*   HCT  29.7*  29.4*  30.9*   PLT  306  289  318     Recent Labs      08/08/17   0411  08/07/17   0233  08/06/17   0402   NA  142  143  140   K  3.9  3.7  3.6   CL  113*  114*  110*   CO2  24  23  24   GLU  92  75  89   BUN  18  19  27*   CREA  1.65*  1.67*  2.02*   CA  8.3*  8.3*  8.3*   MG   --   1.7  1.8   PHOS   --   2.6  2.9   ALB   --   2.6*  2.8*   TBILI   --   0.4  0.5   SGOT   --   34  50*   ALT   --   114*  137*       Signed: Namrata Dexter MD

## 2017-08-08 NOTE — PROGRESS NOTES
Oncology Nursing Communication Tool      7:55 PM  8/8/2017     Bedside and Verbal shift change report given to Sharon Corral RN (incoming nurse) by Chino Montano (outgoing nurse) on Aleisha Mg a 46 y.o. male who was admitted on 8/4/2017  6:41 PM. Report included the following information SBAR, Kardex, Procedure Summary, Intake/Output, MAR, Accordion and Recent Results. Significant changes during shift: Pt had episode of dizziness, slight hypoglycemic episode (78) treated and resolved. Back pain issue.       Issues for physician to address: same as above          Code Status: Full Code     Infections: No current active infections     Allergies: Flexeril [cyclobenzaprine] and Gabapentin     Current diet: DIET DIABETIC CONSISTENT CARB Regular       Pain Controlled [] yes [x] no   Bowel Movement [] yes [x] no   Last Bowel Movement (date) 8/7/17            Vital Signs:   Patient Vitals for the past 12 hrs:   Temp Pulse Resp BP SpO2   08/08/17 1942 99 °F (37.2 °C) 63 16 166/78 98 %   08/08/17 1800 - 65 - 162/85 -   08/08/17 1447 98.9 °F (37.2 °C) 66 16 157/87 98 %   08/08/17 1225 - - - 118/74 -   08/08/17 1224 - - - 148/76 -   08/08/17 1223 - - - 159/84 -   08/08/17 0912 - 68 - 128/73 99 %      Intake & Output:     Intake/Output Summary (Last 24 hours) at 08/08/17 1955  Last data filed at 08/07/17 2025   Gross per 24 hour   Intake            642.5 ml   Output                0 ml   Net            642.5 ml      Laboratory Results:     Recent Results (from the past 12 hour(s))   GLUCOSE, POC    Collection Time: 08/08/17  9:16 AM   Result Value Ref Range    Glucose (POC) 79 65 - 100 mg/dL    Performed by Kamran Ling, POC    Collection Time: 08/08/17  9:40 AM   Result Value Ref Range    Glucose (POC) 78 65 - 100 mg/dL    Performed by Kamran Ling, POC    Collection Time: 08/08/17 10:51 AM   Result Value Ref Range    Glucose (POC) 484 (H) 65 - 100 mg/dL    Performed by Tiana Hays    GLUCOSE, POC    Collection Time: 08/08/17 10:53 AM   Result Value Ref Range    Glucose (POC) 138 (H) 65 - 100 mg/dL    Performed by Mavis Rees, POC    Collection Time: 08/08/17  3:55 PM   Result Value Ref Range    Glucose (POC) 120 (H) 65 - 100 mg/dL    Performed by Kirill Heath for questions and clarifications were given to the incoming nurse. Patient's bed is in low position, side rails x2, door open PRN, call bell within reach and patient not in distress.       Lizzy Sharma

## 2017-08-08 NOTE — PROGRESS NOTES
Rec'd pt on unit. VS obtained and pt denies any pain/n/v at this time. Pt oriented to unit and room. All transfer paperwork completed at this time. Call bell within reach. Primary Nurse Bravo Murray RN and ENRIQUE Pratt performed a dual skin assessment on this patient Impairment noted- see wound doc flow sheet  Phi score is 20  POA: burn to L lower back, dsg dry/clean/intact.

## 2017-08-09 LAB
ANION GAP BLD CALC-SCNC: 4 MMOL/L (ref 5–15)
BUN SERPL-MCNC: 17 MG/DL (ref 6–20)
BUN/CREAT SERPL: 10 (ref 12–20)
CALCIUM SERPL-MCNC: 8.5 MG/DL (ref 8.5–10.1)
CHLORIDE SERPL-SCNC: 109 MMOL/L (ref 97–108)
CO2 SERPL-SCNC: 26 MMOL/L (ref 21–32)
CREAT SERPL-MCNC: 1.64 MG/DL (ref 0.7–1.3)
GLUCOSE BLD STRIP.AUTO-MCNC: 102 MG/DL (ref 65–100)
GLUCOSE BLD STRIP.AUTO-MCNC: 110 MG/DL (ref 65–100)
GLUCOSE BLD STRIP.AUTO-MCNC: 92 MG/DL (ref 65–100)
GLUCOSE BLD STRIP.AUTO-MCNC: 94 MG/DL (ref 65–100)
GLUCOSE SERPL-MCNC: 93 MG/DL (ref 65–100)
POTASSIUM SERPL-SCNC: 4 MMOL/L (ref 3.5–5.1)
SERVICE CMNT-IMP: ABNORMAL
SERVICE CMNT-IMP: ABNORMAL
SERVICE CMNT-IMP: NORMAL
SERVICE CMNT-IMP: NORMAL
SODIUM SERPL-SCNC: 139 MMOL/L (ref 136–145)

## 2017-08-09 PROCEDURE — 82962 GLUCOSE BLOOD TEST: CPT

## 2017-08-09 PROCEDURE — 74011250636 HC RX REV CODE- 250/636: Performed by: EMERGENCY MEDICINE

## 2017-08-09 PROCEDURE — 80048 BASIC METABOLIC PNL TOTAL CA: CPT | Performed by: INTERNAL MEDICINE

## 2017-08-09 PROCEDURE — 74011000250 HC RX REV CODE- 250: Performed by: INTERNAL MEDICINE

## 2017-08-09 PROCEDURE — 74011250637 HC RX REV CODE- 250/637: Performed by: INTERNAL MEDICINE

## 2017-08-09 PROCEDURE — 77030011256 HC DRSG MEPILEX <16IN NO BORD MOLN -A

## 2017-08-09 PROCEDURE — 65270000015 HC RM PRIVATE ONCOLOGY

## 2017-08-09 PROCEDURE — 74011636637 HC RX REV CODE- 636/637: Performed by: INTERNAL MEDICINE

## 2017-08-09 PROCEDURE — 74011250636 HC RX REV CODE- 250/636: Performed by: HOSPITALIST

## 2017-08-09 PROCEDURE — 74011250636 HC RX REV CODE- 250/636: Performed by: INTERNAL MEDICINE

## 2017-08-09 PROCEDURE — 36415 COLL VENOUS BLD VENIPUNCTURE: CPT | Performed by: INTERNAL MEDICINE

## 2017-08-09 PROCEDURE — 74011250637 HC RX REV CODE- 250/637: Performed by: HOSPITALIST

## 2017-08-09 RX ORDER — HYDROMORPHONE HYDROCHLORIDE 1 MG/ML
1 INJECTION, SOLUTION INTRAMUSCULAR; INTRAVENOUS; SUBCUTANEOUS
Status: COMPLETED | OUTPATIENT
Start: 2017-08-09 | End: 2017-08-09

## 2017-08-09 RX ADMIN — INSULIN GLARGINE 10 UNITS: 100 INJECTION, SOLUTION SUBCUTANEOUS at 21:14

## 2017-08-09 RX ADMIN — CARVEDILOL 6.25 MG: 3.12 TABLET, FILM COATED ORAL at 17:50

## 2017-08-09 RX ADMIN — SODIUM CHLORIDE 125 ML/HR: 900 INJECTION, SOLUTION INTRAVENOUS at 06:32

## 2017-08-09 RX ADMIN — ENOXAPARIN SODIUM 40 MG: 40 INJECTION SUBCUTANEOUS at 09:22

## 2017-08-09 RX ADMIN — HYDROMORPHONE HYDROCHLORIDE 1 MG: 1 INJECTION, SOLUTION INTRAMUSCULAR; INTRAVENOUS; SUBCUTANEOUS at 00:24

## 2017-08-09 RX ADMIN — ACETAMINOPHEN 650 MG: 325 TABLET ORAL at 23:12

## 2017-08-09 RX ADMIN — OXYCODONE HYDROCHLORIDE AND ACETAMINOPHEN 1 TABLET: 5; 325 TABLET ORAL at 06:55

## 2017-08-09 RX ADMIN — SILVER SULFADIAZINE: 10 CREAM TOPICAL at 20:12

## 2017-08-09 RX ADMIN — CARVEDILOL 6.25 MG: 3.12 TABLET, FILM COATED ORAL at 09:23

## 2017-08-09 RX ADMIN — SILVER SULFADIAZINE: 10 CREAM TOPICAL at 09:23

## 2017-08-09 RX ADMIN — Medication 10 ML: at 21:14

## 2017-08-09 RX ADMIN — ASPIRIN 81 MG: 81 TABLET, COATED ORAL at 09:22

## 2017-08-09 RX ADMIN — SODIUM CHLORIDE 125 ML/HR: 900 INJECTION, SOLUTION INTRAVENOUS at 15:02

## 2017-08-09 RX ADMIN — Medication 10 ML: at 15:03

## 2017-08-09 RX ADMIN — SODIUM CHLORIDE 125 ML/HR: 900 INJECTION, SOLUTION INTRAVENOUS at 00:04

## 2017-08-09 NOTE — PROGRESS NOTES
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 Likely at baseline. Subjective:     Chief Complaint:  \"My back is killing me! \"  No dyspnea. No N/V. No HA. Review of Systems:    Symptom Y/N Comments  Symptom Y/N Comments   Fever/Chills    Chest Pain     Poor Appetite    Edema     Cough    Abdominal Pain     Sputum    Joint Pain     SOB/ANGULO    Pruritis/Rash     Nausea/vomit    Tolerating PT/OT     Diarrhea    Tolerating Diet     Constipation    Other       Could not obtain due to:      Objective:     VITALS:   Last 24hrs VS reviewed since prior progress note. Most recent are:  Visit Vitals    /79    Pulse 72    Temp 98.9 °F (37.2 °C)    Resp 16    Ht 5' 9\" (1.753 m)    Wt 98.4 kg (217 lb)    SpO2 99%    BMI 32.05 kg/m2     No intake or output data in the 24 hours ending 17 3523   Telemetry Reviewed:     PHYSICAL EXAM:  General: NAD  Few rhonchi  Abd soft        Lab Data Reviewed: (see below)    Medications Reviewed: (see below)    PMH/SH reviewed - no change compared to H&P  ________________________________________________________________________  Care Plan discussed with:  Patient     Family      RN     Care Manager                    Consultant:          Comments   >50% of visit spent in counseling and coordination of care       ________________________________________________________________________  Jessie Muñiz MD     Procedures: see electronic medical records for all procedures/Xrays and details which  were not copied into this note but were reviewed prior to creation of Plan.       LABS:  Recent Labs      17   0411  17   0233   WBC  8.1  6.9   HGB  9.6*  9.6*   HCT 29.7*  29.4*   PLT  306  289     Recent Labs      08/09/17   0645  08/08/17   0411  08/07/17   0233   NA  139  142  143   K  4.0  3.9  3.7   CL  109*  113*  114*   CO2  26  24  23   BUN  17  18  19   CREA  1.64*  1.65*  1.67*   GLU  93  92  75   CA  8.5  8.3*  8.3*   MG   --    --   1.7   PHOS   --    --   2.6     Recent Labs      08/07/17   0233   SGOT  34   AP  100   TP  6.8   ALB  2.6*   GLOB  4.2*     No results for input(s): INR, PTP, APTT in the last 72 hours. No lab exists for component: INREXT, INREXT   No results for input(s): FE, TIBC, PSAT, FERR in the last 72 hours. No results found for: FOL, RBCF   No results for input(s): PH, PCO2, PO2 in the last 72 hours. No results for input(s): CPK, CKMB in the last 72 hours.     No lab exists for component: TROPONINI  No components found for: [place]  Lab Results   Component Value Date/Time    Color YELLOW/STRAW 08/05/2017 07:00 PM    Appearance CLOUDY 08/05/2017 07:00 PM    Specific gravity 1.013 08/05/2017 07:00 PM    pH (UA) 5.0 08/05/2017 07:00 PM    Protein 100 08/05/2017 07:00 PM    Glucose NEGATIVE  08/05/2017 07:00 PM    Ketone NEGATIVE  08/05/2017 07:00 PM    Bilirubin NEGATIVE  08/05/2017 07:00 PM    Urobilinogen 0.2 08/05/2017 07:00 PM    Nitrites NEGATIVE  08/05/2017 07:00 PM    Leukocyte Esterase NEGATIVE  08/05/2017 07:00 PM    Epithelial cells FEW 08/05/2017 07:00 PM    Bacteria NEGATIVE  08/05/2017 07:00 PM    WBC 0-4 08/05/2017 07:00 PM    RBC 0-5 08/05/2017 07:00 PM       MEDICATIONS:  Current Facility-Administered Medications   Medication Dose Route Frequency    carvedilol (COREG) tablet 6.25 mg  6.25 mg Oral BID WITH MEALS    hydrALAZINE (APRESOLINE) 20 mg/mL injection 10 mg  10 mg IntraVENous Q6H PRN    loperamide (IMODIUM) capsule 4 mg  4 mg Oral TID PRN    silver sulfADIAZINE (SILVADENE) 1 % topical cream   Topical DAILY    enoxaparin (LOVENOX) injection 40 mg  40 mg SubCUTAneous Q24H    insulin lispro (HUMALOG) injection SubCUTAneous AC&HS    glucose chewable tablet 16 g  4 Tab Oral PRN    glucagon (GLUCAGEN) injection 1 mg  1 mg IntraMUSCular PRN    0.9% sodium chloride infusion  125 mL/hr IntraVENous CONTINUOUS    aspirin delayed-release tablet 81 mg  81 mg Oral DAILY    insulin glargine (LANTUS) injection 10 Units  10 Units SubCUTAneous QHS    dextrose 10 % infusion 125-250 mL  125-250 mL IntraVENous PRN    sodium chloride (NS) flush 5-10 mL  5-10 mL IntraVENous PRN    sodium chloride (NS) flush 5-10 mL  5-10 mL IntraVENous Q8H    acetaminophen (TYLENOL) tablet 650 mg  650 mg Oral Q6H PRN    oxyCODONE-acetaminophen (PERCOCET) 5-325 mg per tablet 1 Tab  1 Tab Oral Q6H PRN    naloxone (NARCAN) injection 0.4 mg  0.4 mg IntraVENous PRN    ondansetron (ZOFRAN) injection 4 mg  4 mg IntraVENous Q4H PRN    bisacodyl (DULCOLAX) suppository 10 mg  10 mg Rectal DAILY PRN

## 2017-08-09 NOTE — PROGRESS NOTES
Spiritual Care Partner Volunteer visited patient  on 8/9/2017. Documented by: Rev. Mireya Conn.  Meron Fields MA, BCC    Lead  [employer]

## 2017-08-09 NOTE — PROGRESS NOTES
Nutrition Services      Nutrition Screen:  Wt Readings from Last 10 Encounters:   08/04/17 98.4 kg (217 lb)   07/25/17 101.5 kg (223 lb 11.2 oz)   07/21/17 97.9 kg (215 lb 14.4 oz)   03/23/17 108.9 kg (240 lb)   10/14/15 112 kg (247 lb)   10/08/14 115.7 kg (255 lb)   07/09/14 114.3 kg (252 lb)   05/07/14 116.6 kg (257 lb)   04/30/14 116.6 kg (257 lb)   09/14/13 95.3 kg (210 lb)     Body mass index is 32.05 kg/(m^2). Discussed diet w/ pt; pt stated that he has been avoiding foods high in Na & not using salt \" because I have high blood pressure\" & watching his carbs \" because of diabetes. \"     Supplements:                        _____ ordered ______  declined. __ __  Pt is nutritionally stable at this time, will rescreen in 7 days. _x __    Pt is at nutritional risk and will be rescreened in 3-5 days. __ __  Pt is at moderate or high nutritional risk, will refer to RD for assessment.        Yuri Wasserman  Dietetic Technician, Registered

## 2017-08-09 NOTE — PROGRESS NOTES
Hospitalist Progress Note    NAME: Michael Wilkinson   :  1965   MRN:  [id number]       Assessment / Plan:  Syncope due to orthostatic hypotension in settings of dehydration   HTN with orthostatic hypotension   Holding ACE inh, stop Norvasc and Cardura for now   Lowering dose of coreg  Aggressive IVF   Closely monitor orthostatic changes     Persistent/Subacute diarrhea  No BM last 48 hr   Infection w/u so far negative including c.diff/crypto/giardia/ova&paracytes    S/p colonoscopy , biopsy pending   Fecal fat pending   GI help was greatly appreciated     Acute renal failure POA on CKD stage III  Recurrent due to dehydration in settings of ongoing diarrhea   Baseline cr is likely 1.6-2.3, admission 3.00 --> 1.6 - stable   On IVF for orthostatic hypotension   Off ACE for elevated cr and + orthostatics   Nephrology help appreciated   Monitor closely. Avoid nephrotoxic drugs, adjust all meds to GFR. US : Medical renal disease. No hydronephrosis     Type 2 diabetes mellitus   BS    Continue reduced Lantus dose of lantus 10 unit for now  SSI     Right knee pain, status post fall. X-ray is negative.   p.r.n. Tylenol and percocet    ? Prior Cardiomyopathy per H&P  Pt denies any history. No details in record. Last EF 55%  Closely monitoring fluid status     Body mass index is 32.05 kg/(m^2).         Code status: Full  Prophylaxis: Lovenox     Baseline: , independent   Recommended Disposition: Home w/Family once ok with GI      Subjective:      Chief Complaint / Reason for Physician Visit: following diarrhea / orthostatics  C/o R flank pain started yesterday, intermittent, not worsening with movement   Lightheadedness: none since yesterday   + orthostatics but better   159/84 --> 120/62 , not dizzy with standing    Diarrhea: last BM      Overnight events d/w RN    Review of Systems:  Symptom Y/N Comments  Symptom Y/N Comments   Fever/Chills n   Chest Pain n    Poor Appetite    Edema Cough n   Abdominal Pain n    Sputum    Joint Pain     SOB/ANGULO    Pruritis/Rash     Nausea/vomit n   Tolerating PT/OT  Independent    Diarrhea y   Tolerating Diet y    Constipation    Other       Could NOT obtain due to:      Objective:     VITALS:   Last 24hrs VS reviewed since prior progress note. Most recent are:  Patient Vitals for the past 24 hrs:   Temp Pulse Resp BP SpO2   08/09/17 0929 - 68 - 157/74 -   08/09/17 0728 98.9 °F (37.2 °C) 72 16 166/79 99 %   08/08/17 2324 98.7 °F (37.1 °C) 68 16 134/73 98 %   08/08/17 1942 99 °F (37.2 °C) 63 16 166/78 98 %   08/08/17 1800 - 65 - 162/85 -   08/08/17 1447 98.9 °F (37.2 °C) 66 16 157/87 98 %   08/08/17 1225 - - - 118/74 -   08/08/17 1224 - - - 148/76 -   08/08/17 1223 - - - 159/84 -     No intake or output data in the 24 hours ending 08/09/17 0943     PHYSICAL EXAM:  General: WD, WN. Alert, cooperative, no acute distress    EENT:  EOMI. Anicteric sclerae. MMM  Resp:  CTA bilaterally, no wheezing or rales. No accessory muscle use  CV:  Regular  rhythm,  No edema  GI:  Soft, Non distended, Non tender.  +Bowel sounds  Neurologic:  Alert and oriented X 3, normal speech,   Psych:   Good insight. Not anxious nor agitated  Skin:  No rashes. No jaundice  Back:               Not tender on palpation     Reviewed most current lab test results and cultures  YES  Reviewed most current radiology test results   YES  Review and summation of old records today    NO  Reviewed patient's current orders and MAR    YES  PMH/SH reviewed - no change compared to H&P  ________________________________________________________________________  Care Plan discussed with:    Comments   Patient y    Family      RN y    Care Manager     Consultant  y GI                      Multidiciplinary team rounds were held today with , nursing, pharmacist and clinical coordinator. Patient's plan of care was discussed; medications were reviewed and discharge planning was addressed. ________________________________________________________________________  Total NON critical care TIME:  35 Minutes    Total CRITICAL CARE TIME Spent:   Minutes non procedure based      Comments   >50% of visit spent in counseling and coordination of care     ________________________________________________________________________  Ailin Neely MD     Procedures: see electronic medical records for all procedures/Xrays and details which were not copied into this note but were reviewed prior to creation of Plan. LABS:  I reviewed today's most current labs and imaging studies.   Pertinent labs include:  Recent Labs      08/08/17 0411 08/07/17   0233   WBC  8.1  6.9   HGB  9.6*  9.6*   HCT  29.7*  29.4*   PLT  306  289     Recent Labs      08/09/17   0645  08/08/17 0411 08/07/17   0233   NA  139  142  143   K  4.0  3.9  3.7   CL  109*  113*  114*   CO2  26  24  23   GLU  93  92  75   BUN  17  18  19   CREA  1.64*  1.65*  1.67*   CA  8.5  8.3*  8.3*   MG   --    --   1.7   PHOS   --    --   2.6   ALB   --    --   2.6*   TBILI   --    --   0.4   SGOT   --    --   34   ALT   --    --   114*       Signed: Ailin Neely MD

## 2017-08-09 NOTE — PROGRESS NOTES
GI Progress Note (for [facility])  NAME:Reji Welsh OKL:[id number] LJT:[id number]   Prim GI: Kevin Tsai  PCP: Milan Goldman MD  Date/Time:  8/9/2017 7:07 PM   Assessment:   · Chronic diarrhea  · Negative stool studies (Cx, O&P, C diff, WBC)  · Normal colonoscopy including random biopsies  · Fecal fat normal  · CT Abd/Pelvis unremarkable     Plan:   · Diarrhea appears to have resolved. If it recurs consider fecal elastase  · Will sign off. Feel free to call with any questions  · Patient should f/u with Dr. Geoffrey Squires after discharge     Subjective:   Pt w/o any diarrhea >48 hours. Complaint Y/N Description   Abdominal Pain     Hematemesis     Hematochezia     Melena     Constipation     Diarrhea     Dyspepsia     Dysphagia     Jaundiced     Nausea/vomiting       Review of Systems:  Symptom Y/N Comments  Symptom Y/N Comments   Fever/Chills    Chest Pain     Cough    Headaches     Sputum    Joint Pain     SOB/ANGULO    Pruritis/Rash     Tolerating Diet    Other       Could NOT obtain due to:      Objective:   VITALS:   Last 24hrs VS reviewed since prior progress note. Most recent are:  Visit Vitals    /75    Pulse 70    Temp 98.5 °F (36.9 °C)    Resp 16    Ht 5' 9\" (1.753 m)    Wt 98.4 kg (217 lb)    SpO2 100%    BMI 32.05 kg/m2     No intake or output data in the 24 hours ending 08/09/17 1907  PHYSICAL EXAM:  General: WD, WN. Alert, cooperative, no acute distress    HEENT: NC, Atraumatic. PERRLA, EOMI. Anicteric sclerae. Lungs:  CTA Bilaterally. No Wheezing/Rhonchi/Rales. Heart:  Regular  rhythm,  No murmur (), No Rubs, No Gallops  Abdomen: Soft, Non distended, Non tender.  +Bowel sounds, no HSM  Extremities: No c/c/e  Neurologic:  CN 2-12 gi, Alert and oriented X 3. No acute neurological distress   Psych:   Good insight. Not anxious nor agitated.     Lab and Radiology Data Reviewed: (see below)    Medications Reviewed: (see below)  PMH/SH reviewed - no change compared to H&P  ________________________________________________________________________    Care Plan discussed with:  Patient x   Family  x   RN               Consultant:       Ai Tee MD     Procedures: see electronic medical records for all procedures/Xrays and details which were not copied into this note but were reviewed prior to creation of Plan. LABS:  Recent Labs      08/08/17 0411 08/07/17   0233   WBC  8.1  6.9   HGB  9.6*  9.6*   HCT  29.7*  29.4*   PLT  306  289     Recent Labs      08/09/17   0645  08/08/17 0411 08/07/17 0233   NA  139  142  143   K  4.0  3.9  3.7   CL  109*  113*  114*   CO2  26  24  23   BUN  17  18  19   CREA  1.64*  1.65*  1.67*   GLU  93  92  75   CA  8.5  8.3*  8.3*   MG   --    --   1.7   PHOS   --    --   2.6     Recent Labs      08/07/17 0233   SGOT  34   AP  100   TP  6.8   ALB  2.6*   GLOB  4.2*     No results for input(s): INR, PTP, APTT in the last 72 hours. No lab exists for component: INREXT   No results for input(s): FE, TIBC, PSAT, FERR in the last 72 hours. No results found for: FOL, RBCF  No results for input(s): PH, PCO2, PO2 in the last 72 hours. No results for input(s): CPK, CKMB in the last 72 hours.     No lab exists for component: TROPONINI  Lab Results   Component Value Date/Time    Color YELLOW/STRAW 08/05/2017 07:00 PM    Appearance CLOUDY 08/05/2017 07:00 PM    Specific gravity 1.013 08/05/2017 07:00 PM    pH (UA) 5.0 08/05/2017 07:00 PM    Protein 100 08/05/2017 07:00 PM    Glucose NEGATIVE  08/05/2017 07:00 PM    Ketone NEGATIVE  08/05/2017 07:00 PM    Bilirubin NEGATIVE  08/05/2017 07:00 PM    Urobilinogen 0.2 08/05/2017 07:00 PM    Nitrites NEGATIVE  08/05/2017 07:00 PM    Leukocyte Esterase NEGATIVE  08/05/2017 07:00 PM    Epithelial cells FEW 08/05/2017 07:00 PM    Bacteria NEGATIVE  08/05/2017 07:00 PM    WBC 0-4 08/05/2017 07:00 PM    RBC 0-5 08/05/2017 07:00 PM       MEDICATIONS:  Current Facility-Administered Medications Medication Dose Route Frequency    carvedilol (COREG) tablet 6.25 mg  6.25 mg Oral BID WITH MEALS    hydrALAZINE (APRESOLINE) 20 mg/mL injection 10 mg  10 mg IntraVENous Q6H PRN    loperamide (IMODIUM) capsule 4 mg  4 mg Oral TID PRN    silver sulfADIAZINE (SILVADENE) 1 % topical cream   Topical DAILY    enoxaparin (LOVENOX) injection 40 mg  40 mg SubCUTAneous Q24H    insulin lispro (HUMALOG) injection   SubCUTAneous AC&HS    glucose chewable tablet 16 g  4 Tab Oral PRN    glucagon (GLUCAGEN) injection 1 mg  1 mg IntraMUSCular PRN    0.9% sodium chloride infusion  125 mL/hr IntraVENous CONTINUOUS    aspirin delayed-release tablet 81 mg  81 mg Oral DAILY    insulin glargine (LANTUS) injection 10 Units  10 Units SubCUTAneous QHS    dextrose 10 % infusion 125-250 mL  125-250 mL IntraVENous PRN    sodium chloride (NS) flush 5-10 mL  5-10 mL IntraVENous PRN    sodium chloride (NS) flush 5-10 mL  5-10 mL IntraVENous Q8H    acetaminophen (TYLENOL) tablet 650 mg  650 mg Oral Q6H PRN    oxyCODONE-acetaminophen (PERCOCET) 5-325 mg per tablet 1 Tab  1 Tab Oral Q6H PRN    naloxone (NARCAN) injection 0.4 mg  0.4 mg IntraVENous PRN    ondansetron (ZOFRAN) injection 4 mg  4 mg IntraVENous Q4H PRN    bisacodyl (DULCOLAX) suppository 10 mg  10 mg Rectal DAILY PRN

## 2017-08-09 NOTE — PROGRESS NOTES
Oncology Nursing Communication Tool      7:09 PM  8/9/2017     Bedside and Verbal shift change report given to Santa RN (incoming nurse) by Oneida Romero RN (outgoing nurse) on Inga Su a 46 y.o. male who was admitted on 8/4/2017  6:41 PM. Report included the following information SBAR, Kardex, Intake/Output, MAR and Recent Results. Significant changes during shift: none      Issues for physician to address: none            Code Status: Full Code     Infections: No current active infections     Allergies: Flexeril [cyclobenzaprine] and Gabapentin     Current diet: DIET DIABETIC CONSISTENT CARB Regular       Pain Controlled [x] yes [] no   Bowel Movement [] yes [x] no   Last Bowel Movement (date)             Vital Signs:   Patient Vitals for the past 12 hrs:   Temp Pulse Resp BP SpO2   08/09/17 1750 98.5 °F (36.9 °C) 70 16 175/75 100 %   08/09/17 1437 99.5 °F (37.5 °C) 68 16 177/83 100 %   08/09/17 0929 - 68 - 157/74 -   08/09/17 0728 98.9 °F (37.2 °C) 72 16 166/79 99 %      Intake & Output:   No intake or output data in the 24 hours ending 08/09/17 1909   Laboratory Results:     Recent Results (from the past 12 hour(s))   GLUCOSE, POC    Collection Time: 08/09/17  7:32 AM   Result Value Ref Range    Glucose (POC) 94 65 - 100 mg/dL    Performed by Franklyn Oro    GLUCOSE, POC    Collection Time: 08/09/17 11:23 AM   Result Value Ref Range    Glucose (POC) 110 (H) 65 - 100 mg/dL    Performed by Chidi Garcia    GLUCOSE, POC    Collection Time: 08/09/17  4:35 PM   Result Value Ref Range    Glucose (POC) 92 65 - 100 mg/dL    Performed by [address] [place] [address] for questions and clarifications were given to the incoming nurse. Patient's bed is in low position, side rails x2, door open PRN, call bell within reach and patient not in distress.       Oneida Romero RN

## 2017-08-09 NOTE — PROGRESS NOTES
0010: Pt c/o persistent 9/10 pain to low back after admin of x1 PRN percocet at 2232. On-call MD paged, spoke to Dr. Tran Saundra, received orders for Now dose of 1mg IV Dilaudid.

## 2017-08-10 VITALS
SYSTOLIC BLOOD PRESSURE: 166 MMHG | HEART RATE: 64 BPM | TEMPERATURE: 98.3 F | BODY MASS INDEX: 32.14 KG/M2 | RESPIRATION RATE: 16 BRPM | WEIGHT: 217 LBS | HEIGHT: 69 IN | DIASTOLIC BLOOD PRESSURE: 80 MMHG | OXYGEN SATURATION: 98 %

## 2017-08-10 LAB
ANION GAP BLD CALC-SCNC: 4 MMOL/L (ref 5–15)
BUN SERPL-MCNC: 16 MG/DL (ref 6–20)
BUN/CREAT SERPL: 10 (ref 12–20)
CALCIUM SERPL-MCNC: 8.2 MG/DL (ref 8.5–10.1)
CHLORIDE SERPL-SCNC: 108 MMOL/L (ref 97–108)
CO2 SERPL-SCNC: 27 MMOL/L (ref 21–32)
CREAT SERPL-MCNC: 1.56 MG/DL (ref 0.7–1.3)
GLUCOSE BLD STRIP.AUTO-MCNC: 105 MG/DL (ref 65–100)
GLUCOSE BLD STRIP.AUTO-MCNC: 209 MG/DL (ref 65–100)
GLUCOSE BLD STRIP.AUTO-MCNC: 73 MG/DL (ref 65–100)
GLUCOSE BLD STRIP.AUTO-MCNC: 77 MG/DL (ref 65–100)
GLUCOSE BLD STRIP.AUTO-MCNC: 86 MG/DL (ref 65–100)
GLUCOSE SERPL-MCNC: 79 MG/DL (ref 65–100)
POTASSIUM SERPL-SCNC: 3.7 MMOL/L (ref 3.5–5.1)
SERVICE CMNT-IMP: ABNORMAL
SERVICE CMNT-IMP: ABNORMAL
SERVICE CMNT-IMP: NORMAL
SODIUM SERPL-SCNC: 139 MMOL/L (ref 136–145)

## 2017-08-10 PROCEDURE — 74011250637 HC RX REV CODE- 250/637: Performed by: HOSPITALIST

## 2017-08-10 PROCEDURE — 74011250637 HC RX REV CODE- 250/637: Performed by: INTERNAL MEDICINE

## 2017-08-10 PROCEDURE — 80048 BASIC METABOLIC PNL TOTAL CA: CPT | Performed by: INTERNAL MEDICINE

## 2017-08-10 PROCEDURE — 82962 GLUCOSE BLOOD TEST: CPT

## 2017-08-10 PROCEDURE — 74011250636 HC RX REV CODE- 250/636: Performed by: HOSPITALIST

## 2017-08-10 PROCEDURE — 74011250636 HC RX REV CODE- 250/636: Performed by: INTERNAL MEDICINE

## 2017-08-10 PROCEDURE — 36415 COLL VENOUS BLD VENIPUNCTURE: CPT | Performed by: INTERNAL MEDICINE

## 2017-08-10 RX ORDER — OXYCODONE AND ACETAMINOPHEN 5; 325 MG/1; MG/1
1 TABLET ORAL
Qty: 30 TAB | Refills: 0 | Status: SHIPPED | OUTPATIENT
Start: 2017-08-10 | End: 2018-01-11 | Stop reason: ALTCHOICE

## 2017-08-10 RX ORDER — LOPERAMIDE HYDROCHLORIDE 2 MG/1
4 CAPSULE ORAL
Qty: 20 CAP | Refills: 0 | Status: SHIPPED
Start: 2017-08-10 | End: 2017-08-20

## 2017-08-10 RX ORDER — PREDNISONE 10 MG/1
TABLET ORAL
Qty: 12 TAB | Refills: 0 | Status: SHIPPED | OUTPATIENT
Start: 2017-08-10 | End: 2018-01-11

## 2017-08-10 RX ORDER — CARVEDILOL 6.25 MG/1
6.25 TABLET ORAL 2 TIMES DAILY WITH MEALS
Qty: 60 TAB | Refills: 0 | Status: SHIPPED
Start: 2017-08-10 | End: 2018-01-11 | Stop reason: ALTCHOICE

## 2017-08-10 RX ORDER — SILVER SULFADIAZINE 10 G/1000G
CREAM TOPICAL DAILY
Qty: 50 G | Refills: 0 | Status: SHIPPED | OUTPATIENT
Start: 2017-08-10 | End: 2018-01-11

## 2017-08-10 RX ADMIN — Medication 10 ML: at 14:00

## 2017-08-10 RX ADMIN — ENOXAPARIN SODIUM 40 MG: 40 INJECTION SUBCUTANEOUS at 08:37

## 2017-08-10 RX ADMIN — SILVER SULFADIAZINE: 10 CREAM TOPICAL at 10:00

## 2017-08-10 RX ADMIN — METHYLPREDNISOLONE SODIUM SUCCINATE 60 MG: 40 INJECTION, POWDER, FOR SOLUTION INTRAMUSCULAR; INTRAVENOUS at 09:54

## 2017-08-10 RX ADMIN — Medication 10 ML: at 05:13

## 2017-08-10 RX ADMIN — ASPIRIN 81 MG: 81 TABLET, COATED ORAL at 08:36

## 2017-08-10 RX ADMIN — CARVEDILOL 6.25 MG: 3.12 TABLET, FILM COATED ORAL at 08:36

## 2017-08-10 RX ADMIN — ACETAMINOPHEN 650 MG: 325 TABLET ORAL at 07:43

## 2017-08-10 RX ADMIN — OXYCODONE HYDROCHLORIDE AND ACETAMINOPHEN 1 TABLET: 5; 325 TABLET ORAL at 13:58

## 2017-08-10 RX ADMIN — OXYCODONE HYDROCHLORIDE AND ACETAMINOPHEN 1 TABLET: 5; 325 TABLET ORAL at 05:13

## 2017-08-10 NOTE — PROGRESS NOTES
Hospitalist Progress Note    NAME: Lesta Aase   :  1965   MRN:  [id number]       Assessment / Plan:  Acute gout L foot  Started yesterday with pain /swelling. It was reported to me only this am   Fever yesterday 101 is likely due to above  Pt reports h/o gout  C/w steroids in view of elevated cr   Pain management     Syncope due to orthostatic hypotension in settings of dehydration   HTN with orthostatic hypotension   Holding ACE inh, stop Norvasc and Cardura for now   Lowering dose of coreg  Aggressive IVF   Closely monitor orthostatic changes     Persistent/Subacute diarrhea  No BM since    Infection w/u so far negative including c.diff/crypto/giardia/ova&paracytes    S/p colonoscopy , biopsy normal   Fecal fat normal   GI help was greatly appreciated     Acute renal failure POA on CKD stage III  Recurrent due to dehydration in settings of ongoing diarrhea   Baseline cr is likely 1.6-2.3, admission 3.00 --> 1.6 - stable   DC IVF   Off ACE for elevated cr and + orthostatics   Nephrology help appreciated   Monitor closely. Avoid nephrotoxic drugs, adjust all meds to GFR. US : Medical renal disease. No hydronephrosis     Type 2 diabetes mellitus   BS low side   Continue reduced Lantus dose of lantus 10 unit for now  SSI     Right knee pain, status post fall. X-ray is negative.   p.r.n. Tylenol and percocet    ? Prior Cardiomyopathy per H&P  Pt denies any history. No details in record. Last EF 55%  Closely monitoring fluid status     Body mass index is 32.05 kg/(m^2).         Code status: Full  Prophylaxis: Lovenox     Baseline: , independent   Recommended Disposition: Home w/Family today      Subjective:      Chief Complaint / Reason for Physician Visit: following diarrhea / orthostatics  C/o L foot swelling/pain started yesterday   R flank /lower back pain: improved   Lightheadedness:resolved   Diarrhea: last BM      Overnight events d/w RN    Review of Systems:  Symptom Y/N Comments  Symptom Y/N Comments   Fever/Chills n   Chest Pain n    Poor Appetite    Edema     Cough n   Abdominal Pain n    Sputum    Joint Pain y L foot    SOB/ANGULO    Pruritis/Rash     Nausea/vomit n   Tolerating PT/OT  Independent    Diarrhea y   Tolerating Diet y    Constipation    Other       Could NOT obtain due to:      Objective:     VITALS:   Last 24hrs VS reviewed since prior progress note. Most recent are:  Patient Vitals for the past 24 hrs:   Temp Pulse Resp BP SpO2   08/10/17 0836 - 67 - 161/89 -   08/10/17 0715 99.2 °F (37.3 °C) 74 16 157/64 97 %   08/10/17 0510 98.8 °F (37.1 °C) 76 16 177/89 100 %   08/10/17 0145 99.2 °F (37.3 °C) - - - -   08/09/17 2250 (!) 101.3 °F (38.5 °C) 68 17 166/76 99 %   08/09/17 1750 98.5 °F (36.9 °C) 70 16 175/75 100 %   08/09/17 1437 99.5 °F (37.5 °C) 68 16 177/83 100 %       Intake/Output Summary (Last 24 hours) at 08/10/17 1400  Last data filed at 08/10/17 1357   Gross per 24 hour   Intake                0 ml   Output                1 ml   Net               -1 ml        PHYSICAL EXAM:  General: WD, WN. Alert, cooperative, no acute distress    EENT:  EOMI. Anicteric sclerae. MMM  Resp:  CTA bilaterally, no wheezing or rales. No accessory muscle use  CV:  Regular  rhythm,  No edema  GI:  Soft, Non distended, Non tender.  +Bowel sounds  Neurologic:  Alert and oriented X 3, normal speech,   Psych:   Good insight. Not anxious nor agitated  Skin:  No rashes.   No jaundice  Back:               Not tender on palpation   Extr:                 L foot swelling/tender     Reviewed most current lab test results and cultures  YES  Reviewed most current radiology test results   YES  Review and summation of old records today    NO  Reviewed patient's current orders and MAR    YES  PMH/SH reviewed - no change compared to H&P  ________________________________________________________________________  Care Plan discussed with:    Comments   Patient y    Family      RN y    Care Manager Consultant                        Multidiciplinary team rounds were held today with , nursing, pharmacist and clinical coordinator. Patient's plan of care was discussed; medications were reviewed and discharge planning was addressed. ________________________________________________________________________  Total NON critical care TIME:  35 Minutes    Total CRITICAL CARE TIME Spent:   Minutes non procedure based      Comments   >50% of visit spent in counseling and coordination of care     ________________________________________________________________________  Helder Scherer MD     Procedures: see electronic medical records for all procedures/Xrays and details which were not copied into this note but were reviewed prior to creation of Plan. LABS:  I reviewed today's most current labs and imaging studies.   Pertinent labs include:  Recent Labs      08/08/17   0411   WBC  8.1   HGB  9.6*   HCT  29.7*   PLT  306     Recent Labs      08/10/17   0509  08/09/17   0645  08/08/17   0411   NA  139  139  142   K  3.7  4.0  3.9   CL  108  109*  113*   CO2  27  26  24   GLU  79  93  92   BUN  16  17  18   CREA  1.56*  1.64*  1.65*   CA  8.2*  8.5  8.3*       Signed: Helder Scherer MD

## 2017-08-10 NOTE — PROGRESS NOTES
[facility] August 10, 2017       RE: Luli Brooks      To Whom it May Concern: This is to certify that Luli Brooks has been in the hospital from 8/4/2017 to 8/10/2017. Luli Brooks  may return to work on 8/16/2017. Please feel free to contact my office if you have any questions or concerns. Thank you for your assistance in this matter.        Sincerely,        Shantal Martinez MD  [phone]

## 2017-08-10 NOTE — DISCHARGE INSTRUCTIONS
Patient Discharge Instructions    Bibi Poe / [id number] : 1965    Admitted 2017 Discharged: 8/10/2017         DISCHARGE DIAGNOSIS:     Diarrhea - unknown etiology at present. Colonoscopy was normal including biopsy                 - follow with GI in the office 2-3 weeks or earlier if diarrhea re cure     Low blood pressure due to dehydration                - do not take Norvasc/ lisinopril for now. Follow with PCP next week to re assess blood pressure. PCP should check orthostatics ( blood pressure laying down and standing up)                - coreg dose was decreased for now. Follow with PCP next week to re assess       Gout flare up - continue home medications as previously                       - prednisone was prescribed for flair up    Chronic kidney disease - baseline cr is 1.6                                        - follow with kidney doctor        DM - blood sugars were low side while in the hospital          - take Lantus only 10 units for now. Follow blood sugar at home and write it down        Discuss with PCP next week if lantus dose need to be adjusted       Take Home Medications         General drug facts     If you have a very bad allergy, wear an allergy ID at all times. It is important that you take the medication exactly as they are prescribed. Keep your medication in the bottles provided by the pharmacist.  Keep a list of all your drugs (prescription, natural products, vitamins, OTC) with you. Give this list to your doctor. Do not take other medications without consulting your doctor. Do not share your drugs with others and do not take anyone else's drugs. Keep all drugs out of the reach of children and pets. Most drugs may be thrown away in household trash after mixing with coffee grounds or christian litter and sealing in a plastic bag. Keep a list Call your doctor for help with any side effects.  If in the [place] you may also call the FDA at 1-278-FDA-8120 Talk with the doctor before starting any new drug, including OTC, natural products, or vitamins. What to do at Home    1. Recommended diet:diabetic     2. Recommended activity: Activity as tolerated    3. If you experience any of the following symptoms then please call your primary care physician or return to the emergency room if you cannot get hold of your doctor: fever/chills/ diarrhea/ passing out/ dizziness     4. Wound Care  :Left mid back burn: daily cleanse with NS, wipe wound bed clean. Apply Silvadene cream to wound and cover with a non-adherent dressing like Mepilex Foam of telfa and tape. 5. Lab work: with PCP     6. Bring these papers with you to your follow up appointments. The papers will help your doctors be sure to continue the care plan from the hospital.      Follow-up with:   PCP: Yolanda Dickinson MD  Follow-up Information     Follow up With Details Comments [address], [place] In 1 week  [facility]      Tom Hernandez MD In 2 weeks 2-3 weeks, earlier if diarrhea re cure  [address] [place] [address]  [phone]             Please call for your own appointment        Information obtained by :  I understand that if any problems occur once I am at home I am to contact my physician. I understand and acknowledge receipt of the instructions indicated above.                                                                                                                                            Physician's or R.N.'s Signature                                                                  Date/Time                                                                                                                                              Patient or Representative Signature                                                          Date/Time

## 2017-08-10 NOTE — PROGRESS NOTES
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 Likely at baseline. Subjective:     Chief Complaint:  \"My foot hurts. \"  No dyspnea. No N/V. No HA. Review of Systems:    Symptom Y/N Comments  Symptom Y/N Comments   Fever/Chills    Chest Pain     Poor Appetite    Edema     Cough    Abdominal Pain     Sputum    Joint Pain     SOB/ANGULO    Pruritis/Rash     Nausea/vomit    Tolerating PT/OT     Diarrhea    Tolerating Diet     Constipation    Other       Could not obtain due to:      Objective:     VITALS:   Last 24hrs VS reviewed since prior progress note. Most recent are:  Visit Vitals    /89    Pulse 67    Temp 99.2 °F (37.3 °C)    Resp 16    Ht 5' 9\" (1.753 m)    Wt 98.4 kg (217 lb)    SpO2 97%    BMI 32.05 kg/m2     No intake or output data in the 24 hours ending 08/10/17 0110   Telemetry Reviewed:     PHYSICAL EXAM:  General: NAD  Few rhonchi  Abd soft        Lab Data Reviewed: (see below)    Medications Reviewed: (see below)    PMH/SH reviewed - no change compared to H&P  ________________________________________________________________________  Care Plan discussed with:  Patient     Family      RN     Care Manager                    Consultant:          Comments   >50% of visit spent in counseling and coordination of care       ________________________________________________________________________  Paolo Flores MD     Procedures: see electronic medical records for all procedures/Xrays and details which  were not copied into this note but were reviewed prior to creation of Plan.       LABS:  Recent Labs      17   0411   WBC  8.1   HGB  9.6*   HCT  29.7*   PLT  306     Recent Labs 08/10/17   0509  08/09/17   0645  08/08/17   0411   NA  139  139  142   K  3.7  4.0  3.9   CL  108  109*  113*   CO2  27  26  24   BUN  16  17  18   CREA  1.56*  1.64*  1.65*   GLU  79  93  92   CA  8.2*  8.5  8.3*     No results for input(s): SGOT, GPT, AP, TBIL, TP, ALB, GLOB, GGT, AML, LPSE in the last 72 hours. No lab exists for component: AMYP, HLPSE  No results for input(s): INR, PTP, APTT in the last 72 hours. No lab exists for component: INREXT, INREXT   No results for input(s): FE, TIBC, PSAT, FERR in the last 72 hours. No results found for: FOL, RBCF   No results for input(s): PH, PCO2, PO2 in the last 72 hours. No results for input(s): CPK, CKMB in the last 72 hours.     No lab exists for component: TROPONINI  No components found for: [place]  Lab Results   Component Value Date/Time    Color YELLOW/STRAW 08/05/2017 07:00 PM    Appearance CLOUDY 08/05/2017 07:00 PM    Specific gravity 1.013 08/05/2017 07:00 PM    pH (UA) 5.0 08/05/2017 07:00 PM    Protein 100 08/05/2017 07:00 PM    Glucose NEGATIVE  08/05/2017 07:00 PM    Ketone NEGATIVE  08/05/2017 07:00 PM    Bilirubin NEGATIVE  08/05/2017 07:00 PM    Urobilinogen 0.2 08/05/2017 07:00 PM    Nitrites NEGATIVE  08/05/2017 07:00 PM    Leukocyte Esterase NEGATIVE  08/05/2017 07:00 PM    Epithelial cells FEW 08/05/2017 07:00 PM    Bacteria NEGATIVE  08/05/2017 07:00 PM    WBC 0-4 08/05/2017 07:00 PM    RBC 0-5 08/05/2017 07:00 PM       MEDICATIONS:  Current Facility-Administered Medications   Medication Dose Route Frequency    methylPREDNISolone (PF) (SOLU-MEDROL) injection 60 mg  60 mg IntraVENous ONCE    carvedilol (COREG) tablet 6.25 mg  6.25 mg Oral BID WITH MEALS    hydrALAZINE (APRESOLINE) 20 mg/mL injection 10 mg  10 mg IntraVENous Q6H PRN    loperamide (IMODIUM) capsule 4 mg  4 mg Oral TID PRN    silver sulfADIAZINE (SILVADENE) 1 % topical cream   Topical DAILY    enoxaparin (LOVENOX) injection 40 mg  40 mg SubCUTAneous Q24H    insulin lispro (HUMALOG) injection   SubCUTAneous AC&HS    glucose chewable tablet 16 g  4 Tab Oral PRN    glucagon (GLUCAGEN) injection 1 mg  1 mg IntraMUSCular PRN    0.9% sodium chloride infusion  125 mL/hr IntraVENous CONTINUOUS    aspirin delayed-release tablet 81 mg  81 mg Oral DAILY    insulin glargine (LANTUS) injection 10 Units  10 Units SubCUTAneous QHS    dextrose 10 % infusion 125-250 mL  125-250 mL IntraVENous PRN    sodium chloride (NS) flush 5-10 mL  5-10 mL IntraVENous PRN    sodium chloride (NS) flush 5-10 mL  5-10 mL IntraVENous Q8H    acetaminophen (TYLENOL) tablet 650 mg  650 mg Oral Q6H PRN    oxyCODONE-acetaminophen (PERCOCET) 5-325 mg per tablet 1 Tab  1 Tab Oral Q6H PRN    naloxone (NARCAN) injection 0.4 mg  0.4 mg IntraVENous PRN    ondansetron (ZOFRAN) injection 4 mg  4 mg IntraVENous Q4H PRN    bisacodyl (DULCOLAX) suppository 10 mg  10 mg Rectal DAILY PRN

## 2017-08-10 NOTE — PROGRESS NOTES
Patient is ready for discharge. Pulled patients IV, he tolerated well. Went over discharge instructions with patient and family member, they had no further questions at this time. Medication side effects discussed with patient. Handouts given to the patient. Wheeled patient to his car.

## 2017-08-10 NOTE — DIABETES MGMT
DTC Progress Note    Recommendations/ Comments: Please consider decreasing lantus to 8units Qhs.  Pt with fasting hypoglycemia. Chart reviewed on [facility]. Patient is a 46 y.o. male with known Type 2 Diabetes on levemir 20units daily at home. A1c:   Lab Results   Component Value Date/Time    Hemoglobin A1c 6.5 07/23/2017 03:53 AM    Hemoglobin A1c 8.3 01/22/2013 02:08 PM       Recent Glucose Results:   Lab Results   Component Value Date/Time    GLU 79 08/10/2017 05:09 AM    GLUCPOC 86 08/10/2017 08:12 AM    GLUCPOC 73 08/10/2017 07:49 AM    GLUCPOC 77 08/10/2017 07:19 AM        Lab Results   Component Value Date/Time    Creatinine 1.56 08/10/2017 05:09 AM     Estimated Creatinine Clearance: 63.4 mL/min (based on Cr of 1.56). Active Orders   Diet    DIET DIABETIC CONSISTENT CARB Regular        PO intake:   Patient Vitals for the past 72 hrs:   % Diet Eaten   08/08/17 1630 100 %   08/07/17 1530 100 %       Current hospital DM medication: lantus 10units Qhs and humalog correction    Will continue to follow as needed.     Thank you  Ulysses Decree, BSN, RN, [address]

## 2017-08-10 NOTE — DISCHARGE SUMMARY
Hospitalist Discharge Summary     Patient ID:  Corinne Barbone  [id number]  33 y.o.  1965    PCP on record: Harini Mao MD    Admit date: 8/4/2017  Discharge date and time: 8/10/2017      DISCHARGE DIAGNOSIS:      Syncope due to orthostatic hypotension in settings of dehydration   HTN with orthostatic hypotension   Persistent/Subacute diarrhea, resolved now, unknown etiology at present     Acute renal failure POA on CKD stage III  Recurrent due to dehydration in settings of ongoing diarrhea   Acute gout L foot  Type 2 diabetes mellitus   Right knee pain, status post fall  Prior Cardiomyopathy per H&P, details unknown   Body mass index is 32.05 kg/(m^2). Code status: Full        CONSULTATIONS:  IP CONSULT TO NEPHROLOGY  IP CONSULT TO GASTROENTEROLOGY    Excerpted HPI from H&P of Jamshid Mcgarry MD:    HISTORY OF PRESENT ILLNESS: A 57-year-old    gentleman with history of systolic congestive heart failure in the past in   2012, although his most recent echo 2 weeks ago showed an LVEF of   55% to 60%. He also has known hypertension and stage 3 chronic   disease in the setting of diabetes requiring insulin. He said he had   onset of diarrhea a week or so before his admission to [facility] on   07/22/2017. The diarrhea was brown and very foul smelling at times. He can have multiple times per day, 3+ times per day. It is not clearly   associated with food contact and can occur even at night when he is in   bed sleeping. He has not had any sick contacts. He drinks city water   and does not drink from a well or had any water from streams. He had   a colonoscopy at age 46, which by his report was negative.      He was admitted on 07/22/2017 to 07/25/2017 at [facility] because of   acute kidney injury and diarrhea. His creatinine at that time was 4.63. Lisinopril was held, and he received IV fluids.  He was followed by Dr. Samuel Ramirez, and his creatinine decreased to 1.7 by discharge. He had stool   studies, including WBC, C. difficile and culture. They were all negative,   and the diarrhea seemed better.      At discharge, his lisinopril was resumed. He went back to work and   generally was feeling well until the past 24 hours when the diarrhea   recurred with multiple episodes, similar to before. He went to work this   morning, and while he was on his lunch break, ready to punch out at   [employer], he suddenly passed out. He had no warning that this was   going to happen. He was not feeling lightheaded when he got up. He   had no palpitations, nausea, vomiting, or diaphoresis. He awoke with   EMS there, and they brought him into the emergency room. He denied   any unusual headaches, severe neck pain, chest pain, shortness of   breath, cough, orthopnea, nausea, vomiting, abdominal pain, fevers,   chills. He did have some mild right knee pain. He said he hit the knee   when he fell, but otherwise did not think he injured himself.      In the emergency room, he was found to have a creatinine of 3. His   CPK and troponin were negative. Head CT scan was negative. C-spine   CT scan was negative. Right knee and L-spine x-rays were negative   for any fractures. He was given normal saline bolus. Renal was   contacted. We were asked to admit the patient.    ______________________________________________________________________  DISCHARGE SUMMARY/HOSPITAL COURSE:  for full details see H&P, daily progress notes, labs, consult notes. Acute gout L foot  Started yesterday with pain /swelling.  It was reported to me only this am.    Fever yesterday 101 is likely due to above  Pt reports h/o gout  Feeling better s/p dose of solumedrol, continue to taper    Pain management      Syncope due to orthostatic hypotension in settings of dehydration   HTN with orthostatic hypotension   Holding ACE inh, Norvasc and Cardura for now   Continue on lower dose of coreg  Follow up with PCP in 1 week and adjust as needed    Monitor orthostatic changes in the office      Persistent/Subacute diarrhea - appears to resolve now   No BM since 8/7   Infection w/u so far negative including c.diff/crypto/giardia/ova&paracytes    S/p colonoscopy 8/7, biopsy normal   Fecal fat normal   GI help was greatly appreciated: follow up OP      Acute renal failure POA on CKD stage III  Recurrent due to dehydration in settings of ongoing diarrhea   Baseline cr is likely 1.6-2.3, admission 3.00 --> 1.6 - stable   Off ACE for elevated cr and + orthostatics, restart OP if BP/cr stable    Nephrology help appreciated, follow OP    Avoid nephrotoxic drugs, adjust all meds to GFR. US 7/23: Medical renal disease. No hydronephrosis      Type 2 diabetes mellitus   BS low side   Continue reduced Lantus dose of lantus 10 unit for now  SSI      Right knee pain, status post fall. X-ray is negative.   p.r.n. Tylenol and percocet     ? Prior Cardiomyopathy per H&P  Pt denies any history. No details in record. Last EF 55%  Closely monitoring fluid status      Body mass index is 32.05 kg/(m^2).          Code status: Full  Prophylaxis: Lovenox      Baseline: , independent   Recommended Disposition: Home w/Family today         _______________________________________________________________________  Patient seen and examined by me on discharge day. PHYSICAL EXAM:  General:                    WD, WN. Alert, cooperative, no acute distress    EENT:                                  EOMI. Anicteric sclerae. MMM  Resp:                                   CTA bilaterally, no wheezing or rales. No accessory muscle use  CV:                                      Regular  rhythm,  No edema  GI:                                       Soft, Non distended, Non tender.  +Bowel sounds  Neurologic:                Alert and oriented X 3, normal speech,   Psych:                       Good insight. Not anxious nor agitated  Skin:                                    No rashes. No jaundice  Back:               Not tender on palpation   Extr:                 L foot swelling/tender   _______________________________________________________________________  DISCHARGE MEDICATIONS:   Current Discharge Medication List      START taking these medications    Details   loperamide (IMODIUM) 2 mg capsule Take 2 Caps by mouth three (3) times daily as needed for Diarrhea for up to 10 days. Qty: 20 Cap, Refills: 0      oxyCODONE-acetaminophen (PERCOCET) 5-325 mg per tablet Take 1 Tab by mouth every six (6) hours as needed. Max Daily Amount: 4 Tabs. Qty: 30 Tab, Refills: 0      predniSONE (DELTASONE) 10 mg tablet Take 40 mg PO daily x 3 days  Qty: 12 Tab, Refills: 0      silver sulfADIAZINE (SILVADENE) 1 % topical cream Apply  to affected area daily. Qty: 50 g, Refills: 0         CONTINUE these medications which have CHANGED    Details   carvedilol (COREG) 6.25 mg tablet Take 1 Tab by mouth two (2) times daily (with meals). Qty: 60 Tab, Refills: 0      insulin detemir (LEVEMIR FLEXTOUCH) 100 unit/mL (3 mL) inpn 10 Units by SubCUTAneous route nightly.   Qty: 15 Adjustable Dose Pre-filled Pen Syringe, Refills: 1         CONTINUE these medications which have NOT CHANGED    Details   BD INSULIN PEN NEEDLE UF MINI 31 gauge x 3/16\" ndle USE AS DIRECTED EVERY DAY  Qty: 100 Pen Needle, Refills: 1      aspirin delayed-release 81 mg tablet TAKE 1 TABLET BY MOUTH DAILY WITH BREAKFAST  Qty: 30 Tab, Refills: 5         STOP taking these medications       lisinopril (PRINIVIL, ZESTRIL) 40 mg tablet Comments:   Reason for Stopping:         amLODIPine (NORVASC) 5 mg tablet Comments:   Reason for Stopping:         doxazosin (CARDURA) 4 mg tablet Comments:   Reason for Stopping:         potassium chloride SR (KLOR-CON 10) 10 mEq tablet Comments:   Reason for Stopping:               My Recommended Diet, Activity, Wound Care, and follow-up labs are listed in the patient's Discharge Insturctions which I have personally completed and reviewed.     _______________________________________________________________________  DISPOSITION:    Home with Family: y   Home with HH/PT/OT/RN:    SNF/LTC:    KEIKO:    OTHER:        Condition at Discharge:  Stable  _______________________________________________________________________  Follow up with:   PCP : Gokul Sadler MD  Follow-up Information     Follow up With Details Comments [address], [place] In 1 week  [address] [facility] [id number] [phone]      [place] MD In 2 weeks 2-3 weeks, earlier if diarrhea re cure  [facility] [address] [facility]      [address] [place] [address], MD In 1 month  [address] [facility] [address]  [facility]  [place] [id number] [phone]                Total time in minutes spent coordinating this discharge (includes going over instructions, follow-up, prescriptions, and preparing report for sign off to her PCP) :  > 30 minutes    Signed:  Inessa Jimenez MD

## 2017-08-10 NOTE — PROGRESS NOTES
3753- Patients blood sugar at this time was 77, orange juice given to the patient. 0730 Doctor in room to see the patient. 6821- Blood sugar at this time was 73, breakfast arrived and orange juice given. 1356- Blood sugar at this time 86. Patient sitting in chair with no complaints. Will monitor patient.

## 2017-09-10 PROBLEM — E66.09 NON MORBID OBESITY DUE TO EXCESS CALORIES: Status: ACTIVE | Noted: 2017-09-10

## 2017-09-11 NOTE — DISCHARGE SUMMARY
[address]    Shasha Oates, [address] [facility] SUMMARY       Name:  Edwina Dwyer   MR#:  [id number]   :  1965   Account #:  [de-identified]        Date of Adm:  2017       DIAGNOSES    1. Acute kidney injury. 2. Food poisoning. 3. Cardiomyopathy. 4. Stage III chronic kidney disease. 5. Type 2 insulin-dependent diabetes. 6. Hypertension. 7. Non morbid obesity. SUMMARY: This 70-year-old male with past medical history of   diabetes and hypertension, who presents with a 4-day history of   abdominal pain, nausea, vomiting and intermittent diarrhea. He reports   he was in his usual state of health until Tuesday evening. He had fried   some fish at his mother-in-law's house. He was well into late Tuesday   night or early Wednesday morning, when he began having abdominal   cramping associated with drenching sweats, generalized malaise and   frequent diarrhea. This continued over the next few days, and he   reported passing out on the toilet, and then again when he was   ambulating from the bathroom into his room. He states he fell on his   face. He was also very weak and could not get off the steps. He called   911, the paramedics arrived at his home, which point he had normal   vital signs. His blood sugar was 104. He was not transported to the   emergency room, was encouraged to keep himself hydrated and given   Zofran as prescribed by speech his PCP. As his symptoms did not   improve, he again contacted EMS and was transported to [facility].     In the ER, his diagnostic studies revealed sodium 138, creatinine of 4.6   and he had negative abdominal x-rays. He was transferred to [facility] for treatment and evaluation of acute on chronic renal disease. PAST MEDICAL HISTORY    1. Diabetes. 2. Hypertension. 3. T12-L1 neurological disorder. MEDICATIONS   Include:     1. Norvasc 5 mg a day.    2. Zofran 4 mg sublingual p.r.n. 3. Levemir 20 units subcutaneous daily. 4. Aldactone 25 mg a day. 5. Lisinopril 40 mg a day. 6. Coreg 25 mg b.i.d.   7. Potassium SR 10 mEq daily. 8. Cardura 4 mg at bedtime. 9. Furosemide 40 mg b.i.d. 10. Aspirin 81 mg a day. ALLERGIES    1. FLEXERIL. 2. GABAPENTIN. FAMILY HISTORY: Hypertension in mother and sister. PHYSICAL EXAMINATION   VITAL SIGNS: Blood pressure 141/83, pulse 70, temperature afebrile,   respirations 16, O2 saturation is 99% on room air. GENERAL: Alert and oriented, in no distress. HEENT: Unremarkable. NECK: Supple without adenopathy. CHEST: Clear. COR: Regular rate and rhythm without murmurs. ABDOMEN: Soft, nontender, positive bowel sounds. EXTREMITIES: Revealed no edema, 2+ pulses throughout. NEUROLOGIC: Nonfocal.    LABORATORY VALUES: Revealed a white count of 7.7, hemoglobin   11.9, platelet count of 786,905. Sodium 138, potassium 3.3, BUN 80,   creatinine of 4.63, SGOT 74, . HOSPITAL COURSE: The patient was admitted with acute kidney   injury. Nephrology consult was obtained. He was placed on hydration. He was also put on a proton pump inhibitor and a consult to GI was   made. Stool was also sent for stool studies. Nephrology saw the   patient, who agreed with the IV fluids and held ACE inhibitor and   spironolactone. Renal ultrasound was ordered. Cardiology was also   consulted. His echocardiogram was normal and did not require any   further cardiac evaluation or attention. He was feeling better with   improvement of his creatinine over the next day or 2. His appetite was   also improving. His IV fluids for managed by Nephrology. He was   feeling on 07/25 with a creatinine of 1.7 and potassium of 3.5. He had   been ambulating and eating fine. He was back to baseline and was   discharged to home in much improved condition. DISCHARGE MEDICATIONS   Include:    1. Aspirin 81 mg a day. 2. Amlodipine.      FOLLOWUP: He will follow up with me next week.         MD ELIZABETH Villaseñor / ANDRAE   D:  09/10/2017   10:51   T:  09/11/2017   07:12   Job #:  [id number]

## 2018-01-11 PROBLEM — E11.21 TYPE 2 DIABETES MELLITUS WITH NEPHROPATHY (HCC): Status: ACTIVE | Noted: 2018-01-11

## 2018-02-04 PROBLEM — I50.20 SYSTOLIC HEART FAILURE (HCC): Status: ACTIVE | Noted: 2018-02-04

## 2018-05-09 PROBLEM — N18.30 STAGE 3 CHRONIC KIDNEY DISEASE (HCC): Status: RESOLVED | Noted: 2017-07-22 | Resolved: 2018-05-09

## 2018-05-09 PROBLEM — Z99.2 END STAGE RENAL FAILURE ON DIALYSIS (HCC): Status: ACTIVE | Noted: 2018-05-09

## 2018-05-09 PROBLEM — N18.6 END STAGE RENAL FAILURE ON DIALYSIS (HCC): Status: ACTIVE | Noted: 2018-05-09

## 2019-07-14 ENCOUNTER — HOSPITAL ENCOUNTER (EMERGENCY)
Age: 54
Discharge: HOME | End: 2019-07-15
Attending: EMERGENCY MEDICINE
Payer: MEDICAID

## 2019-07-14 ENCOUNTER — APPOINTMENT (OUTPATIENT)
Dept: CT IMAGING | Age: 54
End: 2019-07-14
Attending: EMERGENCY MEDICINE
Payer: MEDICAID

## 2019-07-14 DIAGNOSIS — N18.6 ESRD (END STAGE RENAL DISEASE) (HCC): ICD-10-CM

## 2019-07-14 DIAGNOSIS — R10.84 ABDOMINAL PAIN, GENERALIZED: ICD-10-CM

## 2019-07-14 DIAGNOSIS — E87.5 ACUTE HYPERKALEMIA: ICD-10-CM

## 2019-07-14 DIAGNOSIS — R11.10 VOMITING, INTRACTABILITY OF VOMITING NOT SPECIFIED, PRESENCE OF NAUSEA NOT SPECIFIED, UNSPECIFIED VOMITING TYPE: Primary | ICD-10-CM

## 2019-07-14 LAB
ALBUMIN SERPL-MCNC: 3.4 G/DL (ref 3.5–5)
ALBUMIN/GLOB SERPL: 0.8 {RATIO} (ref 1.1–2.2)
ALP SERPL-CCNC: 55 U/L (ref 45–117)
ALT SERPL-CCNC: 19 U/L (ref 12–78)
ANION GAP SERPL CALC-SCNC: 3 MMOL/L (ref 5–15)
AST SERPL-CCNC: 9 U/L (ref 15–37)
BASOPHILS # BLD: 0.1 K/UL (ref 0–0.1)
BASOPHILS NFR BLD: 1 % (ref 0–1)
BILIRUB SERPL-MCNC: 0.4 MG/DL (ref 0.2–1)
BUN SERPL-MCNC: 25 MG/DL (ref 6–20)
BUN/CREAT SERPL: 2 (ref 12–20)
CALCIUM SERPL-MCNC: 8.7 MG/DL (ref 8.5–10.1)
CHLORIDE SERPL-SCNC: 99 MMOL/L (ref 97–108)
CO2 SERPL-SCNC: 32 MMOL/L (ref 21–32)
COMMENT, HOLDF: NORMAL
CREAT SERPL-MCNC: 10.5 MG/DL (ref 0.7–1.3)
DIFFERENTIAL METHOD BLD: NORMAL
EOSINOPHIL # BLD: 0.2 K/UL (ref 0–0.4)
EOSINOPHIL NFR BLD: 2 % (ref 0–7)
ERYTHROCYTE [DISTWIDTH] IN BLOOD BY AUTOMATED COUNT: 13.7 % (ref 11.5–14.5)
GLOBULIN SER CALC-MCNC: 4.4 G/DL (ref 2–4)
GLUCOSE SERPL-MCNC: 120 MG/DL (ref 65–100)
HBV SURFACE AB SER QL: REACTIVE
HBV SURFACE AB SER-ACNC: 275.25 MIU/ML
HBV SURFACE AG SER QL: <0.1 INDEX
HBV SURFACE AG SER QL: NEGATIVE
HCT VFR BLD AUTO: 39.7 % (ref 36.6–50.3)
HGB BLD-MCNC: 12.7 G/DL (ref 12.1–17)
IMM GRANULOCYTES # BLD AUTO: 0 K/UL (ref 0–0.04)
IMM GRANULOCYTES NFR BLD AUTO: 0 % (ref 0–0.5)
LACTATE SERPL-SCNC: 1.4 MMOL/L (ref 0.4–2)
LIPASE SERPL-CCNC: 292 U/L (ref 73–393)
LYMPHOCYTES # BLD: 2 K/UL (ref 0.8–3.5)
LYMPHOCYTES NFR BLD: 23 % (ref 12–49)
MCH RBC QN AUTO: 30.5 PG (ref 26–34)
MCHC RBC AUTO-ENTMCNC: 32 G/DL (ref 30–36.5)
MCV RBC AUTO: 95.2 FL (ref 80–99)
MONOCYTES # BLD: 0.6 K/UL (ref 0–1)
MONOCYTES NFR BLD: 7 % (ref 5–13)
NEUTS SEG # BLD: 5.6 K/UL (ref 1.8–8)
NEUTS SEG NFR BLD: 67 % (ref 32–75)
NRBC # BLD: 0 K/UL (ref 0–0.01)
NRBC BLD-RTO: 0 PER 100 WBC
PLATELET # BLD AUTO: 270 K/UL (ref 150–400)
PMV BLD AUTO: 11.7 FL (ref 8.9–12.9)
POTASSIUM SERPL-SCNC: 6 MMOL/L (ref 3.5–5.1)
PROT SERPL-MCNC: 7.8 G/DL (ref 6.4–8.2)
RBC # BLD AUTO: 4.17 M/UL (ref 4.1–5.7)
SAMPLES BEING HELD,HOLD: NORMAL
SODIUM SERPL-SCNC: 134 MMOL/L (ref 136–145)
WBC # BLD AUTO: 8.4 K/UL (ref 4.1–11.1)

## 2019-07-14 PROCEDURE — 74177 CT ABD & PELVIS W/CONTRAST: CPT

## 2019-07-14 PROCEDURE — 83605 ASSAY OF LACTIC ACID: CPT

## 2019-07-14 PROCEDURE — 83690 ASSAY OF LIPASE: CPT

## 2019-07-14 PROCEDURE — 96376 TX/PRO/DX INJ SAME DRUG ADON: CPT

## 2019-07-14 PROCEDURE — 96365 THER/PROPH/DIAG IV INF INIT: CPT

## 2019-07-14 PROCEDURE — 96375 TX/PRO/DX INJ NEW DRUG ADDON: CPT

## 2019-07-14 PROCEDURE — 87340 HEPATITIS B SURFACE AG IA: CPT

## 2019-07-14 PROCEDURE — 93005 ELECTROCARDIOGRAM TRACING: CPT

## 2019-07-14 PROCEDURE — 86706 HEP B SURFACE ANTIBODY: CPT

## 2019-07-14 PROCEDURE — 74011000250 HC RX REV CODE- 250: Performed by: EMERGENCY MEDICINE

## 2019-07-14 PROCEDURE — 80053 COMPREHEN METABOLIC PANEL: CPT

## 2019-07-14 PROCEDURE — 74011250637 HC RX REV CODE- 250/637: Performed by: EMERGENCY MEDICINE

## 2019-07-14 PROCEDURE — 74011636637 HC RX REV CODE- 636/637: Performed by: EMERGENCY MEDICINE

## 2019-07-14 PROCEDURE — 36415 COLL VENOUS BLD VENIPUNCTURE: CPT

## 2019-07-14 PROCEDURE — 74011250636 HC RX REV CODE- 250/636: Performed by: EMERGENCY MEDICINE

## 2019-07-14 PROCEDURE — 90935 HEMODIALYSIS ONE EVALUATION: CPT

## 2019-07-14 PROCEDURE — 99285 EMERGENCY DEPT VISIT HI MDM: CPT

## 2019-07-14 PROCEDURE — 85025 COMPLETE CBC W/AUTO DIFF WBC: CPT

## 2019-07-14 PROCEDURE — 74011000258 HC RX REV CODE- 258: Performed by: EMERGENCY MEDICINE

## 2019-07-14 PROCEDURE — 74011636320 HC RX REV CODE- 636/320: Performed by: EMERGENCY MEDICINE

## 2019-07-14 PROCEDURE — 74011250636 HC RX REV CODE- 250/636

## 2019-07-14 RX ORDER — ASPIRIN 81 MG
1 TABLET, DELAYED RELEASE (ENTERIC COATED) ORAL 2 TIMES DAILY
Qty: 14 TAB | Refills: 0 | Status: SHIPPED | OUTPATIENT
Start: 2019-07-14

## 2019-07-14 RX ORDER — FENTANYL CITRATE 50 UG/ML
50 INJECTION, SOLUTION INTRAMUSCULAR; INTRAVENOUS
Status: COMPLETED | OUTPATIENT
Start: 2019-07-14 | End: 2019-07-14

## 2019-07-14 RX ORDER — MORPHINE SULFATE 4 MG/ML
4 INJECTION INTRAVENOUS ONCE
Status: COMPLETED | OUTPATIENT
Start: 2019-07-14 | End: 2019-07-14

## 2019-07-14 RX ORDER — ACETAMINOPHEN 500 MG
1000 TABLET ORAL
Status: COMPLETED | OUTPATIENT
Start: 2019-07-14 | End: 2019-07-14

## 2019-07-14 RX ORDER — CALCIUM GLUCONATE 94 MG/ML
1 INJECTION, SOLUTION INTRAVENOUS
Status: DISCONTINUED | OUTPATIENT
Start: 2019-07-14 | End: 2019-07-14

## 2019-07-14 RX ORDER — METOCLOPRAMIDE 10 MG/1
10 TABLET ORAL
Qty: 12 TAB | Refills: 0 | Status: SHIPPED | OUTPATIENT
Start: 2019-07-14 | End: 2019-07-24

## 2019-07-14 RX ORDER — ONDANSETRON 2 MG/ML
INJECTION INTRAMUSCULAR; INTRAVENOUS
Status: COMPLETED
Start: 2019-07-14 | End: 2019-07-14

## 2019-07-14 RX ORDER — ONDANSETRON 2 MG/ML
4 INJECTION INTRAMUSCULAR; INTRAVENOUS
Status: COMPLETED | OUTPATIENT
Start: 2019-07-14 | End: 2019-07-14

## 2019-07-14 RX ORDER — LIDOCAINE AND PRILOCAINE 25; 25 MG/G; MG/G
CREAM TOPICAL AS NEEDED
COMMUNITY

## 2019-07-14 RX ORDER — SODIUM CHLORIDE 0.9 % (FLUSH) 0.9 %
10 SYRINGE (ML) INJECTION
Status: COMPLETED | OUTPATIENT
Start: 2019-07-14 | End: 2019-07-14

## 2019-07-14 RX ORDER — SODIUM POLYSTYRENE SULFONATE 15 G/60ML
15 SUSPENSION ORAL; RECTAL
Status: COMPLETED | OUTPATIENT
Start: 2019-07-14 | End: 2019-07-14

## 2019-07-14 RX ORDER — SUCRALFATE 1 G/1
1 TABLET ORAL 4 TIMES DAILY
Qty: 30 TAB | Refills: 0 | Status: SHIPPED | OUTPATIENT
Start: 2019-07-14

## 2019-07-14 RX ORDER — METOCLOPRAMIDE HYDROCHLORIDE 5 MG/ML
10 INJECTION INTRAMUSCULAR; INTRAVENOUS
Status: COMPLETED | OUTPATIENT
Start: 2019-07-14 | End: 2019-07-14

## 2019-07-14 RX ADMIN — MORPHINE SULFATE 4 MG: 4 INJECTION INTRAVENOUS at 10:02

## 2019-07-14 RX ADMIN — ALUMINUM HYDROXIDE AND MAGNESIUM HYDROXIDE 40 ML: 200; 200 SUSPENSION ORAL at 12:09

## 2019-07-14 RX ADMIN — SODIUM POLYSTYRENE SULFONATE 15 G: 15 SUSPENSION ORAL; RECTAL at 13:44

## 2019-07-14 RX ADMIN — ONDANSETRON 4 MG: 2 INJECTION INTRAMUSCULAR; INTRAVENOUS at 22:07

## 2019-07-14 RX ADMIN — IOPAMIDOL 100 ML: 755 INJECTION, SOLUTION INTRAVENOUS at 10:17

## 2019-07-14 RX ADMIN — FENTANYL CITRATE 50 MCG: 50 INJECTION, SOLUTION INTRAMUSCULAR; INTRAVENOUS at 21:57

## 2019-07-14 RX ADMIN — CALCIUM GLUCONATE 1 G: 98 INJECTION, SOLUTION INTRAVENOUS at 11:57

## 2019-07-14 RX ADMIN — METOCLOPRAMIDE 10 MG: 5 INJECTION, SOLUTION INTRAMUSCULAR; INTRAVENOUS at 11:53

## 2019-07-14 RX ADMIN — ONDANSETRON 4 MG: 2 INJECTION INTRAMUSCULAR; INTRAVENOUS at 10:02

## 2019-07-14 RX ADMIN — ACETAMINOPHEN 1000 MG: 500 TABLET ORAL at 12:08

## 2019-07-14 RX ADMIN — Medication 10 ML: at 10:17

## 2019-07-14 RX ADMIN — HUMAN INSULIN 5 UNITS: 100 INJECTION, SOLUTION SUBCUTANEOUS at 11:55

## 2019-07-14 NOTE — PROGRESS NOTES
Called by ED,patient is ESRD TTS,now has K:6  ;will dialysis him today in the [facility] RN aware

## 2019-07-14 NOTE — PROGRESS NOTES
Pharmacy Clarification of Prior to Admission Medication Regimen     The patient was interviewed regarding clarification of the prior to admission medication regimen and was questioned regarding use of any other inhalers, topical products, over the counter medications, herbal medications, vitamin products or ophthalmic/nasal/otic medication use. During interview patient was very lethargic, MHT had to wake him several times to interview him. Information Obtained From: RX Query, Patient    Pertinent Pharmacy Findings:  During interview patient had to be waken several times and answered yes to every question asked. MHT even asked about medications not on patient's PTA med list and patient stated that he was taking those medications. All last doses recorded were given by patient. Further follow up may be necessary. PTA medication list was corrected to the following:     Prior to Admission Medications   Prescriptions Last Dose Informant Patient Reported? Taking?   aspirin delayed-release 81 mg tablet 7/13/2019 at Unknown time Self No Yes   Sig: TAKE 1 TABLET BY MOUTH DAILY WITH BREAKFAST   carvedilol (COREG) 12.5 mg tablet 7/13/2019 at Unknown time Self No Yes   Sig: TAKE 3 TABS BY MOUTH TWO (2) TIMES DAILY (WITH MEALS). colchicine 0.6 mg tablet 7/7/2019 at Unknown time Self Yes Yes   Sig: TAKE 1 TABLET BY MOUTH TWICE A DAY   insulin detemir U-100 (LEVEMIR FLEXTOUCH U-100 INSULN) 100 unit/mL (3 mL) inpn 7/13/2019 at Unknown time Self Yes Yes   Sig: 10 Units by SubCUTAneous route daily. isosorbide mononitrate ER (IMDUR) 30 mg tablet 7/13/2019 at Unknown time Self Yes Yes   Sig: Take 30 mg by mouth every morning. lidocaine-prilocaine (EMLA) topical cream 7/7/2019 at Unknown time Self Yes Yes   Sig: Apply  to affected area as needed for Pain.    sevelamer (RENAGEL) 800 mg tablet 7/13/2019 at Unknown time Self Yes Yes   Sig: Take 800 mg by mouth.   sevelamer carbonate (RENVELA) 800 mg tab tab 7/13/2019 at Unknown time Self Yes Yes   Sig: TAKE 1 TABLET BY MOUTH THREE TIMES A DAY WITH MEALS   torsemide (DEMADEX) 100 mg tablet 7/13/2019 at Unknown time Self Yes Yes   Sig: TAKE 1 TABLET BY MOUTH EVERY DAY      Facility-Administered Medications: None          Thank you,  Anastacio Clinton  Medication History Pharmacy Technician

## 2019-07-14 NOTE — LETTER
NOTIFICATION RETURN TO WORK  
 
 
7/15/2019 1:47 AM 
 
Mr. Ata Lott [address] [id number] To Whom It May Concern: 
 
Ata Lott is currently under the care of [facility] EMERGENCY DEPT. He will return to work/school on: 7/17/19 Ata Lott may return to work/school with no restrictions. If there are questions or concerns please have the patient contact our office. Sincerely, Thai West

## 2019-07-14 NOTE — ED PROVIDER NOTES
EMERGENCY DEPARTMENT HISTORY AND PHYSICAL EXAM      Date: 7/14/2019  Patient Name: Quang Call    Please note that this dictation was completed with [employer], the computer voice recognition software. Quite often unanticipated grammatical, syntax, homophones, and other interpretive errors are inadvertently transcribed by the computer software. Please disregard these errors. Please excuse any errors that have escaped final proofreading. History of Presenting Illness     Chief Complaint   Patient presents with    Abdominal Pain     pt reports right side abdominal pain beginning last night, vomited x1 today    Vomiting       History Provided By: Patient    HPI: Quang Call, 52 y.o. male, with a past medical history significant for end-stage renal disease on dialysis, diabetes, multiple abdominal surgeries presented the emergency department complaining of nausea and vomiting and right upper quadrant abdominal pain. Patient rates the pain is severe. Associated with nonbloody nonbilious emesis. No exacerbating or relieving factors. Denies any change in stool, had a normal bowel movement this morning, no hematochezia or melena. Patient denies any fevers or chills. Denies any chest pain or shortness of breath. PCP: Wilfred Meyer MD    No current facility-administered medications on file prior to encounter. Current Outpatient Medications on File Prior to Encounter   Medication Sig Dispense Refill    LEVEMIR FLEXTOUCH U-100 INSULN 100 unit/mL (3 mL) inpn INJECT 20 UNITS SUBCUTANEOUSLY EVERY DAY 2 Adjustable Dose Pre-filled Pen Syringe 5    carvedilol (COREG) 12.5 mg tablet TAKE 3 TABS BY MOUTH TWO (2) TIMES DAILY (WITH MEALS).  180 Tab 5    colchicine 0.6 mg tablet TAKE 1 TABLET BY MOUTH TWICE A DAY  3    sevelamer carbonate (RENVELA) 800 mg tab tab TAKE 1 TABLET BY MOUTH THREE TIMES A DAY WITH MEALS  0    torsemide (DEMADEX) 100 mg tablet TAKE 1 TABLET BY MOUTH EVERY DAY  0    hydrALAZINE (APRESOLINE) 25 mg tablet Take 75 mg by mouth.  isosorbide mononitrate ER (IMDUR) 30 mg tablet Take  by mouth.  sevelamer (RENAGEL) 800 mg tablet Take 800 mg by mouth.  BD INSULIN PEN NEEDLE UF MINI 31 gauge x 3/16\" ndle USE AS DIRECTED EVERY  Pen Needle 1    aspirin delayed-release 81 mg tablet TAKE 1 TABLET BY MOUTH DAILY WITH BREAKFAST 30 Tab 5       Past History     Past Medical History:  Past Medical History:   Diagnosis Date    Cardiomyopathy ([facility] 2012 Echo: severe conc LVH. EF 25-30%. Cath : clean coronaries. [facility] hospitalization for Hypertensive Emergency     [facility]     End stage renal failure on dialysis ([facility] 2018    Hypertension     Hypertensive emergency 2012    [facility]. 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, l1    Type I (juvenile type) diabetes mellitus without mention of complication, not stated as uncontrolled        Past Surgical History:  Past Surgical History:   Procedure Laterality Date    COLONOSCOPY N/A 2017    COLONOSCOPY performed by Neil Woods MD at [address]  2017         HX APPENDECTOMY         Family History:  Family History   Problem Relation Age of Onset    Hypertension Mother     Hypertension Sister        Social History:  Social History     Tobacco Use    Smoking status: Former Smoker     Packs/day: 0.50     Last attempt to quit: 2002     Years since quittin.9    Smokeless tobacco: Never Used   Substance Use Topics    Alcohol use: No     Comment: occ    Drug use: No       Allergies: Allergies   Allergen Reactions    Flexeril [Cyclobenzaprine] Nausea Only     Patient states he is not allergic    Gabapentin Unknown (comments)     Patient states he is not allergic         Review of Systems   Review of Systems   Constitutional: Negative for chills and fever. HENT: Negative for congestion and sore throat.     Eyes: Negative for visual disturbance. Respiratory: Negative for cough and shortness of breath. Cardiovascular: Negative for chest pain and leg swelling. Gastrointestinal: Positive for abdominal pain, nausea and vomiting. Negative for blood in stool and diarrhea. Endocrine: Negative for polyuria. Genitourinary: Negative for dysuria and testicular pain. Musculoskeletal: Negative for arthralgias, joint swelling and myalgias. Skin: Negative for rash. Allergic/Immunologic: Negative for immunocompromised state. Neurological: Negative for weakness and headaches. Hematological: Does not bruise/bleed easily. Psychiatric/Behavioral: Negative for confusion. Physical Exam   Physical Exam   Constitutional: oriented to person, place, and time. appears well-developed and well-nourished. HENT:   Head: Normocephalic and atraumatic. Moist mucous membranes   Eyes: Pupils are equal, round, and reactive to light. Conjunctivae are normal. Right eye exhibits no discharge. Left eye exhibits no discharge. Neck: Normal range of motion. Neck supple. No tracheal deviation present. Cardiovascular: Normal rate, regular rhythm and normal heart sounds. No murmur heard. Pulmonary/Chest: Effort normal and breath sounds normal. No respiratory distress. no wheezes. no rales. Abdominal: Soft. Bowel sounds are normal. There is no tenderness. There is no rebound and no guarding. Musculoskeletal: Normal range of motion. exhibits no edema, tenderness or deformity. Neurological: alert and oriented to person, place, and time. Skin: Skin is warm and dry. No rash noted. No erythema. Psychiatric: behavior is normal.   Nursing note and vitals reviewed.     Diagnostic Study Results     Labs -     Recent Results (from the past 12 hour(s))   CBC WITH AUTOMATED DIFF    Collection Time: 07/14/19  9:20 AM   Result Value Ref Range    WBC 8.4 4.1 - 11.1 K/uL    RBC 4.17 4.10 - 5.70 M/uL    HGB 12.7 12.1 - 17.0 g/dL    HCT 39.7 36.6 - 50.3 % MCV 95.2 80.0 - 99.0 FL    MCH 30.5 26.0 - 34.0 PG    MCHC 32.0 30.0 - 36.5 g/dL    RDW 13.7 11.5 - 14.5 %    PLATELET 172 869 - 824 K/uL    MPV 11.7 8.9 - 12.9 FL    NRBC 0.0 0  WBC    ABSOLUTE NRBC 0.00 0.00 - 0.01 K/uL    NEUTROPHILS 67 32 - 75 %    LYMPHOCYTES 23 12 - 49 %    MONOCYTES 7 5 - 13 %    EOSINOPHILS 2 0 - 7 %    BASOPHILS 1 0 - 1 %    IMMATURE GRANULOCYTES 0 0.0 - 0.5 %    ABS. NEUTROPHILS 5.6 1.8 - 8.0 K/UL    ABS. LYMPHOCYTES 2.0 0.8 - 3.5 K/UL    ABS. MONOCYTES 0.6 0.0 - 1.0 K/UL    ABS. EOSINOPHILS 0.2 0.0 - 0.4 K/UL    ABS. BASOPHILS 0.1 0.0 - 0.1 K/UL    ABS. IMM. GRANS. 0.0 0.00 - 0.04 K/UL    DF AUTOMATED     LACTIC ACID    Collection Time: 07/14/19  9:47 AM   Result Value Ref Range    Lactic acid 1.4 0.4 - 2.0 MMOL/L       Radiologic Studies -   CT ABD PELV W CONT   Final Result   IMPRESSION:   No acute findings. CT Results  (Last 48 hours)               07/14/19 1018  CT ABD PELV W CONT Final result    Impression:  IMPRESSION:   No acute findings. Narrative:  EXAM: CT ABD PELV W CONT       INDICATION: Abdominal pain right-sided since yesterday. Has also been vomiting   since yesterday. Patient diabetic dialysis patient. COMPARISON: CT 8/8/2017        CONTRAST: 100 mL of Isovue-370. TECHNIQUE:    Following the uneventful intravenous administration of contrast, thin axial   images were obtained through the abdomen and pelvis. Coronal and sagittal   reconstructions were generated. Oral contrast was not, per order, administered. CT dose reduction was achieved through use of a standardized protocol tailored   for this examination and automatic exposure control for dose modulation. The lack of oral contrast material diminishes the capacity of CT to evaluate the   bowel and adjacent structures. FINDINGS:    LUNG BASES: Clear. INCIDENTALLY IMAGED HEART AND MEDIASTINUM: Mildly enlarged. No pericardial   effusion. LIVER: Diffuse hepatic steatosis.  No hepatic mass or biliary ductal dilation. GALLBLADDER: Unremarkable. SPLEEN: No mass. PANCREAS: No mass or ductal dilatation. ADRENALS: Unchanged 1.6 x 1.4 x 1.0 cm left adrenal adenoma. KIDNEYS: Diminished size of kidneys bilaterally. No mass or hydronephrosis. STOMACH: Unremarkable. SMALL BOWEL: No dilatation or wall thickening. COLON: No dilatation or wall thickening. APPENDIX: Not demonstrated. PERITONEUM: No ascites or pneumoperitoneum. RETROPERITONEUM: No lymphadenopathy or aortic aneurysm. REPRODUCTIVE ORGANS: Those shown appear normal.   URINARY BLADDER: Nondistended. BONES: No destructive bone lesion. ADDITIONAL COMMENTS: N/A               CXR Results  (Last 48 hours)    None            Medical Decision Making   I am the first provider for this patient. I reviewed the vital signs, available nursing notes, past medical history, past surgical history, family history and social history. Vital Signs-Reviewed the patient's vital signs. Patient Vitals for the past 12 hrs:   Temp Pulse Resp BP SpO2   07/14/19 0857 98.3 °F (36.8 °C) 78 18 118/69 98 %           Records Reviewed: Nursing Notes and Old Medical Records    Provider Notes (Medical Decision Making):   Patient presents with abdominal pain. DDx: Gastroenteritis, SBO, appendicitis, colitis, IBD, diverticulitis, mesenteric ischemia, AAA or descending dissection, ACS, ureteral stone. Will get labs and CT Abdomen/pelvis. ED Course:   Initial assessment performed. The patients presenting problems have been discussed, and they are in agreement with the care plan formulated and outlined with them. I have encouraged them to ask questions as they arise throughout their visit.     ED Course as of Jul 17 4018   Sun Jul 14, 2019   1135 Discussed with nephrology, Dr. Reina Sinha, recommends dialysis, will call his nurse and to have the patient dialyzed here in the emergency department.    [AR]      ED Course User Index  [AR] Hazel Santos, Tessa Cazares DO         Critical Care Time:   none    Disposition:  DISCHARGE NOTE  Patients results have been reviewed with them. Patient and/or family have verbally conveyed their understanding and agreement of the patient's signs, symptoms, diagnosis, treatment and prognosis and additionally agree to follow up as recommended or return to the Emergency Room should their condition change or have any new concerns prior to their follow-up appointment. Patient verbally agrees with the care-plan and verbally conveys that all of their questions have been answered. Discharge instructions have also been provided to the patient with some educational information regarding their diagnosis as well a list of reasons why they would want to return to the ER prior to their follow-up appointment should their condition change. PLAN:  1. Current Discharge Medication List        2. Follow-up Information    None         Return to ED if worse     Diagnosis     Clinical Impression: No diagnosis found. Attestations:   This note was completed by Dafne Sue DO

## 2019-07-14 NOTE — ED TRIAGE NOTES
Pt complains of right sided abdominal pain since yesterday. Pt has been vomiting since yesterday. LBM was this morning. Pt denies any chest pain at this time. Pt is a diabetic and a dialysis pt. Pt is also aneuric.

## 2019-07-14 NOTE — DISCHARGE INSTRUCTIONS

## 2019-07-15 VITALS
HEART RATE: 100 BPM | BODY MASS INDEX: 31.1 KG/M2 | RESPIRATION RATE: 14 BRPM | WEIGHT: 210 LBS | OXYGEN SATURATION: 96 % | DIASTOLIC BLOOD PRESSURE: 60 MMHG | HEIGHT: 69 IN | TEMPERATURE: 98.8 F | SYSTOLIC BLOOD PRESSURE: 138 MMHG

## 2019-07-15 LAB
ATRIAL RATE: 76 BPM
CALCULATED P AXIS, ECG09: 82 DEGREES
CALCULATED R AXIS, ECG10: 19 DEGREES
CALCULATED T AXIS, ECG11: 50 DEGREES
DIAGNOSIS, 93000: NORMAL
P-R INTERVAL, ECG05: 172 MS
Q-T INTERVAL, ECG07: 374 MS
QRS DURATION, ECG06: 80 MS
QTC CALCULATION (BEZET), ECG08: 420 MS
VENTRICULAR RATE, ECG03: 76 BPM

## 2019-07-15 NOTE — ED NOTES
Dialysis paged to ask when they would be coming to dialyze pt. Day shift marcello stated night shift would be performing when I spoke with them at 2000.

## 2019-07-15 NOTE — ED NOTES
D/C instructions reviewed with pt. All questions answered. Pt placed in lobby to wait for transportation.

## 2019-07-15 NOTE — DIALYSIS
[facility]  [phone]    Vitals   Pre   Post   Assessment   Pre   Post     Temp  98  98.8 LOC  AAOX3 AAOX3   HR   Pulse (Heart Rate): 66 (07/14/19 2311) 100 Lungs   CTA  CTA   B/P   BP: 121/72 (07/14/19 2311) 138/60 Cardiac   Denies CP.   denies CP   Resp   Resp Rate: 12 (07/14/19 2311) 14 Skin   Warm/dry  warm/dry   Pain level  Pain Intensity 1: 9 (07/14/19 2157) 0 Edema  +1 general     +1 gen   Orders:    Duration:   Start:   2553 End:   0114 Total:   2HR   Dialyzer:   Dialyzer/Set Up Inspection: Revaclear (07/14/19 2311)   K Bath:   Dialysate K (mEq/L): 2 (07/14/19 2311)   Ca Bath:   Dialysate CA (mEq/L): 2.5 (07/14/19 2311)   Na/Bicarb:   Dialysate NA (mEq/L): 138 (07/14/19 2311)   Target Fluid Removal:   Goal/Amount of Fluid to Remove (mL): 1000 mL (07/14/19 2311)   Access     Type & Location:   LUE AVF   Labs     Obtained/Reviewed   Critical Results Called   Date when labs were drawn-  Hgb-    HGB   Date Value Ref Range Status   07/14/2019 12.7 12.1 - 17.0 g/dL Final     K-    Potassium   Date Value Ref Range Status   07/14/2019 6.0 (H) 3.5 - 5.1 mmol/L Final     Ca-   Calcium   Date Value Ref Range Status   07/14/2019 8.7 8.5 - 10.1 MG/DL Final     Bun-   BUN   Date Value Ref Range Status   07/14/2019 25 (H) 6 - 20 MG/DL Final     Creat-   Creatinine   Date Value Ref Range Status   07/14/2019 10.50 (H) 0.70 - 1.30 MG/DL Final        Medications/ Blood Products Given     Name   Dose   Route and Time                     Blood Volume Processed (BVP):    41.2L Net Fluid   Removed:  1000ML   Comments   Time Out Done: 9758  Primary Nurse Rpt Pre: Raisa Ordonez RN  Primary Nurse Rpt Post: Raisa Ordonez RN  Pt Education: potassium levels, fluid management, procedural  Care Plan: per primary team  Tx Summary: Pt arrived to suite in no acute distress. Orders, labs, notes reviewed. Consent obtained at bedside. LUE AVF assessed +bruit/thrill.  Cannulated with 15G needles for good apparent flows. Taped per policy. During tx, needle position was an issue as max qB 350ml/min. Multiple attempts to troubleshoot/reposition needles performed. Pt tolerated 2hr HD tx for net uf 1000ml. Post tx all possible blood returned. Needles removed and hemostasis achieved <10min. Access sites bandaged per policy. Pt returned to home unit via hospital staff.     Admiting Diagnosis: abd pain  Consent signed - Informed Consent Verified: Yes (07/14/19 2311)  [facility] Consent - yes  Hepatitis Status- unknown  Machine #- Machine Number: [id number] (07/14/19 2311)

## 2021-09-07 NOTE — ED NOTES
Received request via: Patient    Was the patient seen in the last year in this department? Yes    Does the patient have an active prescription (recently filled or refills available) for medication(s) requested? Yes.   TRANSFER - OUT REPORT:    Verbal report given to Mary Ann Cedeño RN(name) on Carrington Connor  being transferred to [facility] [place] [address] room 507(unit) for routine progression of care       Report consisted of patients Situation, Background, Assessment and   Recommendations(SBAR). Information from the following report(s) SBAR, ED Summary and Intake/Output was reviewed with the receiving nurse. Lines:   Peripheral IV 07/21/17 Right Antecubital (Active)   Site Assessment Clean, dry, & intact 7/21/2017  6:49 PM   Phlebitis Assessment 0 7/21/2017  6:49 PM   Infiltration Assessment 0 7/21/2017  6:49 PM   Dressing Status Clean, dry, & intact;New;Occlusive 7/21/2017  6:49 PM   Dressing Type Transparent 7/21/2017  6:49 PM   Hub Color/Line Status Flushed;Patent;Green 7/21/2017  6:49 PM   Action Taken Blood drawn 7/21/2017  6:49 PM       Peripheral IV 07/21/17 Left Forearm (Active)   Site Assessment Clean, dry, & intact 7/21/2017  6:50 PM   Phlebitis Assessment 0 7/21/2017  6:50 PM   Infiltration Assessment 0 7/21/2017  6:50 PM   Dressing Status Clean, dry, & intact;New;Occlusive 7/21/2017  6:50 PM   Dressing Type Transparent 7/21/2017  6:50 PM   Hub Color/Line Status Green;Flushed;Patent 7/21/2017  6:50 PM   Action Taken Blood drawn 7/21/2017  6:50 PM        Opportunity for questions and clarification was provided.

## 2022-03-18 PROBLEM — E11.9 HYPERTENSION COMPLICATING DIABETES (HCC): Status: ACTIVE | Noted: 2017-03-26

## 2022-03-18 PROBLEM — I50.20 SYSTOLIC HEART FAILURE (HCC): Status: ACTIVE | Noted: 2018-02-04

## 2022-03-18 PROBLEM — I15.2 HYPERTENSION COMPLICATING DIABETES (HCC): Status: ACTIVE | Noted: 2017-03-26

## 2022-03-18 PROBLEM — Z79.4 INSULIN LONG-TERM USE (HCC): Status: ACTIVE | Noted: 2017-03-26

## 2022-03-18 PROBLEM — I10 HYPERTENSION COMPLICATING DIABETES (HCC): Status: ACTIVE | Noted: 2017-03-26

## 2022-03-18 PROBLEM — E11.59 HYPERTENSION COMPLICATING DIABETES (HCC): Status: ACTIVE | Noted: 2017-03-26

## 2022-03-19 PROBLEM — E11.21 TYPE 2 DIABETES MELLITUS WITH NEPHROPATHY (HCC): Status: ACTIVE | Noted: 2018-01-11

## 2022-03-19 PROBLEM — N17.9 ACUTE KIDNEY INJURY (HCC): Status: ACTIVE | Noted: 2017-07-22

## 2022-03-19 PROBLEM — I42.9 CARDIOMYOPATHY (HCC): Status: ACTIVE | Noted: 2017-07-22

## 2022-03-19 PROBLEM — Z99.2 END STAGE RENAL FAILURE ON DIALYSIS (HCC): Status: ACTIVE | Noted: 2018-05-09

## 2022-03-19 PROBLEM — N18.6 END STAGE RENAL FAILURE ON DIALYSIS (HCC): Status: ACTIVE | Noted: 2018-05-09

## 2022-03-19 PROBLEM — E66.09 NON MORBID OBESITY DUE TO EXCESS CALORIES: Status: ACTIVE | Noted: 2017-09-10

## 2022-03-19 PROBLEM — A05.9 FOOD POISONING: Status: ACTIVE | Noted: 2017-07-25

## 2022-03-19 PROBLEM — E11.8 TYPE 2 DIABETES MELLITUS WITH COMPLICATION, WITH LONG-TERM CURRENT USE OF INSULIN (HCC): Status: ACTIVE | Noted: 2017-03-26

## 2022-03-19 PROBLEM — Z79.4 TYPE 2 DIABETES MELLITUS WITH COMPLICATION, WITH LONG-TERM CURRENT USE OF INSULIN (HCC): Status: ACTIVE | Noted: 2017-03-26

## 2022-03-19 PROBLEM — R55 SYNCOPE AND COLLAPSE: Status: ACTIVE | Noted: 2017-08-04

## 2024-11-11 ENCOUNTER — APPOINTMENT (OUTPATIENT)
Facility: HOSPITAL | Age: 59
End: 2024-11-11
Payer: MEDICARE

## 2024-11-11 ENCOUNTER — HOSPITAL ENCOUNTER (EMERGENCY)
Facility: HOSPITAL | Age: 59
Discharge: HOME | End: 2024-11-11
Attending: EMERGENCY MEDICINE
Payer: MEDICARE

## 2024-11-11 VITALS
DIASTOLIC BLOOD PRESSURE: 71 MMHG | OXYGEN SATURATION: 100 % | HEIGHT: 69 IN | WEIGHT: 203.04 LBS | SYSTOLIC BLOOD PRESSURE: 111 MMHG | TEMPERATURE: 98.4 F | HEART RATE: 60 BPM | BODY MASS INDEX: 30.07 KG/M2 | RESPIRATION RATE: 16 BRPM

## 2024-11-11 DIAGNOSIS — J18.9 ATYPICAL PNEUMONIA: Primary | ICD-10-CM

## 2024-11-11 LAB
ALBUMIN SERPL-MCNC: 3.2 G/DL (ref 3.5–5)
ALBUMIN/GLOB SERPL: 0.8 (ref 1.1–2.2)
ALP SERPL-CCNC: 52 U/L (ref 45–117)
ALT SERPL-CCNC: 15 U/L (ref 12–78)
ANION GAP SERPL CALC-SCNC: 6 MMOL/L (ref 2–12)
AST SERPL-CCNC: 6 U/L (ref 15–37)
BASOPHILS # BLD: 0.1 K/UL (ref 0–0.1)
BASOPHILS NFR BLD: 1 % (ref 0–1)
BILIRUB SERPL-MCNC: 0.4 MG/DL (ref 0.2–1)
BUN SERPL-MCNC: 41 MG/DL (ref 6–20)
BUN/CREAT SERPL: 3 (ref 12–20)
CALCIUM SERPL-MCNC: 9.1 MG/DL (ref 8.5–10.1)
CHLORIDE SERPL-SCNC: 99 MMOL/L (ref 97–108)
CO2 SERPL-SCNC: 30 MMOL/L (ref 21–32)
CREAT SERPL-MCNC: 14.1 MG/DL (ref 0.7–1.3)
DIFFERENTIAL METHOD BLD: ABNORMAL
EKG ATRIAL RATE: 73 BPM
EKG DIAGNOSIS: NORMAL
EKG P AXIS: 76 DEGREES
EKG P-R INTERVAL: 154 MS
EKG Q-T INTERVAL: 386 MS
EKG QRS DURATION: 72 MS
EKG QTC CALCULATION (BAZETT): 425 MS
EKG R AXIS: 40 DEGREES
EKG T AXIS: 60 DEGREES
EKG VENTRICULAR RATE: 73 BPM
EOSINOPHIL # BLD: 0.5 K/UL (ref 0–0.4)
EOSINOPHIL NFR BLD: 6 % (ref 0–7)
ERYTHROCYTE [DISTWIDTH] IN BLOOD BY AUTOMATED COUNT: 14.1 % (ref 11.5–14.5)
GLOBULIN SER CALC-MCNC: 3.8 G/DL (ref 2–4)
GLUCOSE SERPL-MCNC: 85 MG/DL (ref 65–100)
HCT VFR BLD AUTO: 35.3 % (ref 36.6–50.3)
HGB BLD-MCNC: 11.6 G/DL (ref 12.1–17)
IMM GRANULOCYTES # BLD AUTO: 0 K/UL (ref 0–0.04)
IMM GRANULOCYTES NFR BLD AUTO: 0 % (ref 0–0.5)
LIPASE SERPL-CCNC: 141 U/L (ref 13–75)
LYMPHOCYTES # BLD: 2.5 K/UL (ref 0.8–3.5)
LYMPHOCYTES NFR BLD: 31 % (ref 12–49)
MAGNESIUM SERPL-MCNC: 2.6 MG/DL (ref 1.6–2.4)
MCH RBC QN AUTO: 30.4 PG (ref 26–34)
MCHC RBC AUTO-ENTMCNC: 32.9 G/DL (ref 30–36.5)
MCV RBC AUTO: 92.4 FL (ref 80–99)
MONOCYTES # BLD: 0.9 K/UL (ref 0–1)
MONOCYTES NFR BLD: 11 % (ref 5–13)
NEUTS SEG # BLD: 4.1 K/UL (ref 1.8–8)
NEUTS SEG NFR BLD: 51 % (ref 32–75)
NRBC # BLD: 0 K/UL (ref 0–0.01)
NRBC BLD-RTO: 0 PER 100 WBC
PLATELET # BLD AUTO: 238 K/UL (ref 150–400)
PMV BLD AUTO: 10.7 FL (ref 8.9–12.9)
POTASSIUM SERPL-SCNC: 5 MMOL/L (ref 3.5–5.1)
PROT SERPL-MCNC: 7 G/DL (ref 6.4–8.2)
RBC # BLD AUTO: 3.82 M/UL (ref 4.1–5.7)
SODIUM SERPL-SCNC: 135 MMOL/L (ref 136–145)
WBC # BLD AUTO: 8.1 K/UL (ref 4.1–11.1)

## 2024-11-11 PROCEDURE — 83690 ASSAY OF LIPASE: CPT

## 2024-11-11 PROCEDURE — 36415 COLL VENOUS BLD VENIPUNCTURE: CPT

## 2024-11-11 PROCEDURE — 93005 ELECTROCARDIOGRAM TRACING: CPT | Performed by: EMERGENCY MEDICINE

## 2024-11-11 PROCEDURE — 83735 ASSAY OF MAGNESIUM: CPT

## 2024-11-11 PROCEDURE — 71046 X-RAY EXAM CHEST 2 VIEWS: CPT

## 2024-11-11 PROCEDURE — 96374 THER/PROPH/DIAG INJ IV PUSH: CPT

## 2024-11-11 PROCEDURE — 80053 COMPREHEN METABOLIC PANEL: CPT

## 2024-11-11 PROCEDURE — 6360000002 HC RX W HCPCS: Performed by: EMERGENCY MEDICINE

## 2024-11-11 PROCEDURE — 85025 COMPLETE CBC W/AUTO DIFF WBC: CPT

## 2024-11-11 PROCEDURE — 99285 EMERGENCY DEPT VISIT HI MDM: CPT

## 2024-11-11 RX ORDER — ONDANSETRON 4 MG/1
4 TABLET, ORALLY DISINTEGRATING ORAL 3 TIMES DAILY PRN
Qty: 21 TABLET | Refills: 0 | Status: SHIPPED | OUTPATIENT
Start: 2024-11-11

## 2024-11-11 RX ORDER — ONDANSETRON 2 MG/ML
4 INJECTION INTRAMUSCULAR; INTRAVENOUS ONCE
Status: COMPLETED | OUTPATIENT
Start: 2024-11-11 | End: 2024-11-11

## 2024-11-11 RX ORDER — DEXTROMETHORPHAN HYDROBROMIDE AND PROMETHAZINE HYDROCHLORIDE 15; 6.25 MG/5ML; MG/5ML
5 SYRUP ORAL 4 TIMES DAILY PRN
Qty: 118 ML | Refills: 0 | Status: SHIPPED | OUTPATIENT
Start: 2024-11-11 | End: 2024-11-18

## 2024-11-11 RX ORDER — DOXYCYCLINE HYCLATE 100 MG
100 TABLET ORAL 2 TIMES DAILY
Qty: 14 TABLET | Refills: 0 | Status: SHIPPED | OUTPATIENT
Start: 2024-11-11 | End: 2024-11-18

## 2024-11-11 RX ADMIN — ONDANSETRON 4 MG: 2 INJECTION INTRAMUSCULAR; INTRAVENOUS at 11:15

## 2024-11-11 ASSESSMENT — PAIN SCALES - GENERAL: PAINLEVEL_OUTOF10: 8

## 2024-11-11 NOTE — ED PROVIDER NOTES
[facility] EMERGENCY DEPT  EMERGENCY DEPARTMENT ENCOUNTER       Pt Name: Gurjit Farrell  MRN: [id number]  Birthdate 1965  Date of evaluation: 11/11/2024  Provider: Melo Esquivel DO   PCP: Mathew Daniels MD  Note Started: 11:14 AM EST 11/11/24     CHIEF COMPLAINT       Chief Complaint   Patient presents with    Nausea & Vomiting     Ambulatory with n/v for two days, denies abdominal pain.     Cough     Pt states has a productive cough that keeps him up at night, pt states he is worries he has PNA. Pt a Tue/Thurs/Sat dialysis pt, last session was Saturday. Pt states he gets CP only when he coughs.         HISTORY OF PRESENT ILLNESS: 1 or more elements      History From: patient, History limited by: none     Gurjit Farrell is a 58 y.o. male presents to the emergency department for evaluation of cough nausea and vomiting.       Please See [facility] for Additional Details of the HPI/PMH  Nursing Notes were all reviewed and agreed with or any disagreements were addressed in the HPI.     REVIEW OF SYSTEMS        Positives and Pertinent negatives as per HPI.    PAST HISTORY     Past Medical History:  Past Medical History:   Diagnosis Date    Cardiomyopathy (HCC) 05/2012 2012 Echo: severe conc LVH. EF 25-30%. Cath : clean coronaries. [facility] hospitalization for Hypertensive Emergency     Diabetes (HCC)     End stage renal failure on dialysis (HCC) 5/9/2018    Hypertension     Hypertensive emergency 05/2012    [facility]. new onset Pulmonary Edema     Neurological disorder     t-12, l1    Type I (juvenile type) diabetes mellitus without mention of complication, not stated as uncontrolled        Past Surgical History:  Past Surgical History:   Procedure Laterality Date    APPENDECTOMY      COLONOSCOPY N/A 8/7/2017    COLONOSCOPY performed by Jesus Coleman MD at [facility] ENDOSCOPY    COLONOSCOPY,BIOPSY  8/7/2017            Family History:  Family History   Problem Relation Age of Onset    Hypertension Sister     Hypertension Mother

## 2025-08-15 ENCOUNTER — APPOINTMENT (OUTPATIENT)
Facility: HOSPITAL | Age: 60
End: 2025-08-15
Payer: MEDICARE

## 2025-08-15 ENCOUNTER — HOSPITAL ENCOUNTER (EMERGENCY)
Facility: HOSPITAL | Age: 60
Discharge: HOME | End: 2025-08-15
Attending: STUDENT IN AN ORGANIZED HEALTH CARE EDUCATION/TRAINING PROGRAM
Payer: MEDICARE

## 2025-08-15 VITALS
TEMPERATURE: 97.8 F | OXYGEN SATURATION: 100 % | DIASTOLIC BLOOD PRESSURE: 66 MMHG | HEART RATE: 75 BPM | RESPIRATION RATE: 14 BRPM | SYSTOLIC BLOOD PRESSURE: 105 MMHG

## 2025-08-15 DIAGNOSIS — I95.9 HYPOTENSION, UNSPECIFIED HYPOTENSION TYPE: ICD-10-CM

## 2025-08-15 DIAGNOSIS — R42 DIZZINESS: Primary | ICD-10-CM

## 2025-08-15 LAB
ANION GAP SERPL CALC-SCNC: 12 MMOL/L (ref 2–14)
BASOPHILS # BLD: 0.07 K/UL (ref 0–0.1)
BASOPHILS NFR BLD: 0.8 % (ref 0–1)
BUN SERPL-MCNC: 31 MG/DL (ref 8–23)
BUN/CREAT SERPL: 3 (ref 12–20)
CA-I BLD-SCNC: 1.13 MMOL/L (ref 1.12–1.32)
CALCIUM SERPL-MCNC: 8.9 MG/DL (ref 8.8–10.2)
CHLORIDE SERPL-SCNC: 97 MMOL/L (ref 98–107)
CO2 SERPL-SCNC: 28 MMOL/L (ref 20–29)
CREAT SERPL-MCNC: 11.7 MG/DL (ref 0.7–1.2)
DIFFERENTIAL METHOD BLD: ABNORMAL
EKG ATRIAL RATE: 66 BPM
EKG DIAGNOSIS: NORMAL
EKG P AXIS: 70 DEGREES
EKG P-R INTERVAL: 160 MS
EKG Q-T INTERVAL: 416 MS
EKG QRS DURATION: 74 MS
EKG QTC CALCULATION (BAZETT): 436 MS
EKG R AXIS: 22 DEGREES
EKG T AXIS: 62 DEGREES
EKG VENTRICULAR RATE: 66 BPM
EOSINOPHIL # BLD: 0.27 K/UL (ref 0–0.4)
EOSINOPHIL NFR BLD: 2.9 % (ref 0–7)
ERYTHROCYTE [DISTWIDTH] IN BLOOD BY AUTOMATED COUNT: 15.1 % (ref 11.5–14.5)
GLUCOSE SERPL-MCNC: 78 MG/DL (ref 65–100)
HBV SURFACE AB SERPL IA-ACNC: REACTIVE MIU/ML
HBV SURFACE AG SER QL: NONREACTIVE
HCT VFR BLD AUTO: 40 % (ref 36.6–50.3)
HGB BLD-MCNC: 12.8 G/DL (ref 12.1–17)
IMM GRANULOCYTES # BLD AUTO: 0.03 K/UL (ref 0–0.04)
IMM GRANULOCYTES NFR BLD AUTO: 0.3 % (ref 0–0.5)
LYMPHOCYTES # BLD: 2.48 K/UL (ref 0.8–3.5)
LYMPHOCYTES NFR BLD: 27.1 % (ref 12–49)
MCH RBC QN AUTO: 30 PG (ref 26–34)
MCHC RBC AUTO-ENTMCNC: 32 G/DL (ref 30–36.5)
MCV RBC AUTO: 93.7 FL (ref 80–99)
MONOCYTES # BLD: 0.88 K/UL (ref 0–1)
MONOCYTES NFR BLD: 9.6 % (ref 5–13)
NEUTS SEG # BLD: 5.43 K/UL (ref 1.8–8)
NEUTS SEG NFR BLD: 59.3 % (ref 32–75)
NRBC # BLD: 0 K/UL (ref 0–0.01)
NRBC BLD-RTO: 0 PER 100 WBC
PLATELET # BLD AUTO: 256 K/UL (ref 150–400)
PMV BLD AUTO: 10.2 FL (ref 8.9–12.9)
POTASSIUM SERPL-SCNC: 5.9 MMOL/L (ref 3.5–5.1)
RBC # BLD AUTO: 4.27 M/UL (ref 4.1–5.7)
SODIUM SERPL-SCNC: 136 MMOL/L (ref 136–145)
WBC # BLD AUTO: 9.2 K/UL (ref 4.1–11.1)

## 2025-08-15 PROCEDURE — 86706 HEP B SURFACE ANTIBODY: CPT

## 2025-08-15 PROCEDURE — 93005 ELECTROCARDIOGRAM TRACING: CPT | Performed by: STUDENT IN AN ORGANIZED HEALTH CARE EDUCATION/TRAINING PROGRAM

## 2025-08-15 PROCEDURE — 99284 EMERGENCY DEPT VISIT MOD MDM: CPT

## 2025-08-15 PROCEDURE — 96360 HYDRATION IV INFUSION INIT: CPT

## 2025-08-15 PROCEDURE — 94762 N-INVAS EAR/PLS OXIMTRY CONT: CPT

## 2025-08-15 PROCEDURE — 87340 HEPATITIS B SURFACE AG IA: CPT

## 2025-08-15 PROCEDURE — 85025 COMPLETE CBC W/AUTO DIFF WBC: CPT

## 2025-08-15 PROCEDURE — 6370000000 HC RX 637 (ALT 250 FOR IP): Performed by: STUDENT IN AN ORGANIZED HEALTH CARE EDUCATION/TRAINING PROGRAM

## 2025-08-15 PROCEDURE — 80048 BASIC METABOLIC PNL TOTAL CA: CPT

## 2025-08-15 PROCEDURE — 70450 CT HEAD/BRAIN W/O DYE: CPT

## 2025-08-15 PROCEDURE — 36415 COLL VENOUS BLD VENIPUNCTURE: CPT

## 2025-08-15 PROCEDURE — 2580000003 HC RX 258: Performed by: STUDENT IN AN ORGANIZED HEALTH CARE EDUCATION/TRAINING PROGRAM

## 2025-08-15 PROCEDURE — 82330 ASSAY OF CALCIUM: CPT

## 2025-08-15 RX ORDER — 0.9 % SODIUM CHLORIDE 0.9 %
500 INTRAVENOUS SOLUTION INTRAVENOUS ONCE
Status: COMPLETED | OUTPATIENT
Start: 2025-08-15 | End: 2025-08-15

## 2025-08-15 RX ORDER — MIDODRINE HYDROCHLORIDE 5 MG/1
10 TABLET ORAL ONCE
Status: COMPLETED | OUTPATIENT
Start: 2025-08-15 | End: 2025-08-15

## 2025-08-15 RX ADMIN — MIDODRINE HYDROCHLORIDE 10 MG: 5 TABLET ORAL at 16:47

## 2025-08-15 RX ADMIN — SODIUM CHLORIDE 500 ML: 0.9 INJECTION, SOLUTION INTRAVENOUS at 16:14

## 2025-08-15 ASSESSMENT — PAIN SCALES - GENERAL: PAINLEVEL_OUTOF10: 5

## 2025-08-15 ASSESSMENT — PAIN DESCRIPTION - ORIENTATION: ORIENTATION: POSTERIOR

## 2025-08-15 ASSESSMENT — LIFESTYLE VARIABLES
HOW OFTEN DO YOU HAVE A DRINK CONTAINING ALCOHOL: MONTHLY OR LESS
HOW MANY STANDARD DRINKS CONTAINING ALCOHOL DO YOU HAVE ON A TYPICAL DAY: 1 OR 2

## 2025-08-15 ASSESSMENT — PAIN DESCRIPTION - LOCATION: LOCATION: HEAD

## 2025-08-15 ASSESSMENT — PAIN - FUNCTIONAL ASSESSMENT: PAIN_FUNCTIONAL_ASSESSMENT: 0-10

## (undated) DEVICE — 1200 GUARD II KIT W/5MM TUBE W/O VAC TUBE: Brand: GUARDIAN

## (undated) DEVICE — (D)SYR 10ML 1/5ML GRAD NSAF -- PKGING CHANGE USE ITEM 338027

## (undated) DEVICE — SET ADMIN 16ML TBNG L100IN 2 Y INJ SITE IV PIGGY BK DISP

## (undated) DEVICE — Device: Brand: MEDEX

## (undated) DEVICE — SYR 3ML LL TIP 1/10ML GRAD --

## (undated) DEVICE — CONTAINER SPEC 20 ML LID NEUT BUFF FORMALIN 10 % POLYPR STS

## (undated) DEVICE — NEONATAL-ADULT SPO2 SENSOR: Brand: NELLCOR

## (undated) DEVICE — Z DISCONTINUED PER MEDLINE LINE GAS SAMPLING O2/CO2 LNG AD 13 FT NSL W/ TBNG FILTERLINE

## (undated) DEVICE — BAG SPEC BIOHZRD 10 X 10 IN --

## (undated) DEVICE — FORCEPS BX L240CM JAW DIA2.8MM L CAP W/ NDL MIC MESH TOOTH

## (undated) DEVICE — Device

## (undated) DEVICE — SOLIDIFIER MEDC 1200ML -- CONVERT TO 356117

## (undated) DEVICE — TOWEL 4 PLY TISS 19X30 SUE WHT

## (undated) DEVICE — BASIN EMESIS 500CC ROSE 250/CS 60/PLT: Brand: MEDEGEN MEDICAL PRODUCTS, LLC

## (undated) DEVICE — NEEDLE HYPO 18GA L1.5IN PNK S STL HUB POLYPR SHLD REG BVL